# Patient Record
Sex: MALE | Race: WHITE | NOT HISPANIC OR LATINO | URBAN - METROPOLITAN AREA
[De-identification: names, ages, dates, MRNs, and addresses within clinical notes are randomized per-mention and may not be internally consistent; named-entity substitution may affect disease eponyms.]

---

## 2023-07-12 ENCOUNTER — INPATIENT (INPATIENT)
Facility: HOSPITAL | Age: 83
LOS: 27 days | Discharge: HOME HEALTH | DRG: 949 | End: 2023-08-09
Attending: PSYCHIATRY & NEUROLOGY | Admitting: PHYSICAL MEDICINE & REHABILITATION
Payer: MEDICARE

## 2023-07-12 VITALS
DIASTOLIC BLOOD PRESSURE: 74 MMHG | WEIGHT: 154.32 LBS | RESPIRATION RATE: 16 BRPM | HEART RATE: 102 BPM | OXYGEN SATURATION: 94 % | SYSTOLIC BLOOD PRESSURE: 106 MMHG | TEMPERATURE: 99 F | HEIGHT: 70 IN

## 2023-07-12 DIAGNOSIS — S06.5X9A TRAUMATIC SUBDURAL HEMORRHAGE WITH LOSS OF CONSCIOUSNESS OF UNSPECIFIED DURATION, INITIAL ENCOUNTER: ICD-10-CM

## 2023-07-12 DIAGNOSIS — Z96.659 PRESENCE OF UNSPECIFIED ARTIFICIAL KNEE JOINT: Chronic | ICD-10-CM

## 2023-07-12 DIAGNOSIS — Z98.890 OTHER SPECIFIED POSTPROCEDURAL STATES: Chronic | ICD-10-CM

## 2023-07-12 RX ORDER — ACETAMINOPHEN 500 MG
650 TABLET ORAL EVERY 6 HOURS
Refills: 0 | Status: DISCONTINUED | OUTPATIENT
Start: 2023-07-12 | End: 2023-08-09

## 2023-07-12 RX ORDER — TAMSULOSIN HYDROCHLORIDE 0.4 MG/1
0.4 CAPSULE ORAL AT BEDTIME
Refills: 0 | Status: DISCONTINUED | OUTPATIENT
Start: 2023-07-12 | End: 2023-07-20

## 2023-07-12 RX ORDER — DIGOXIN 250 MCG
125 TABLET ORAL DAILY
Refills: 0 | Status: DISCONTINUED | OUTPATIENT
Start: 2023-07-13 | End: 2023-07-25

## 2023-07-12 RX ORDER — AMIODARONE HYDROCHLORIDE 400 MG/1
200 TABLET ORAL DAILY
Refills: 0 | Status: DISCONTINUED | OUTPATIENT
Start: 2023-07-13 | End: 2023-08-09

## 2023-07-12 RX ORDER — POLYETHYLENE GLYCOL 3350 17 G/17G
17 POWDER, FOR SOLUTION ORAL DAILY
Refills: 0 | Status: DISCONTINUED | OUTPATIENT
Start: 2023-07-12 | End: 2023-08-09

## 2023-07-12 RX ORDER — LEVETIRACETAM 250 MG/1
500 TABLET, FILM COATED ORAL
Refills: 0 | Status: COMPLETED | OUTPATIENT
Start: 2023-07-12 | End: 2023-07-23

## 2023-07-12 RX ORDER — ZINC OXIDE 200 MG/G
1 OINTMENT TOPICAL
Refills: 0 | Status: DISCONTINUED | OUTPATIENT
Start: 2023-07-12 | End: 2023-08-09

## 2023-07-12 RX ORDER — LANOLIN ALCOHOL/MO/W.PET/CERES
3 CREAM (GRAM) TOPICAL AT BEDTIME
Refills: 0 | Status: DISCONTINUED | OUTPATIENT
Start: 2023-07-12 | End: 2023-07-14

## 2023-07-12 RX ORDER — SENNA PLUS 8.6 MG/1
2 TABLET ORAL AT BEDTIME
Refills: 0 | Status: DISCONTINUED | OUTPATIENT
Start: 2023-07-12 | End: 2023-08-09

## 2023-07-12 RX ORDER — METOPROLOL TARTRATE 50 MG
50 TABLET ORAL
Refills: 0 | Status: DISCONTINUED | OUTPATIENT
Start: 2023-07-12 | End: 2023-07-14

## 2023-07-12 RX ORDER — DAPAGLIFLOZIN 10 MG/1
10 TABLET, FILM COATED ORAL DAILY
Refills: 0 | Status: DISCONTINUED | OUTPATIENT
Start: 2023-07-12 | End: 2023-07-18

## 2023-07-12 RX ORDER — BACITRACIN ZINC NEOMYCIN SULFATE POLYMYXIN B SULFATE 400; 3.5; 5 [IU]/G; MG/G; [IU]/G
1 OINTMENT TOPICAL
Refills: 0 | Status: DISCONTINUED | OUTPATIENT
Start: 2023-07-12 | End: 2023-08-09

## 2023-07-12 RX ADMIN — TAMSULOSIN HYDROCHLORIDE 0.4 MILLIGRAM(S): 0.4 CAPSULE ORAL at 21:43

## 2023-07-12 RX ADMIN — LEVETIRACETAM 500 MILLIGRAM(S): 250 TABLET, FILM COATED ORAL at 19:52

## 2023-07-12 RX ADMIN — Medication 3 MILLIGRAM(S): at 21:43

## 2023-07-12 RX ADMIN — Medication 50 MILLIGRAM(S): at 19:53

## 2023-07-12 NOTE — PATIENT PROFILE ADULT - FALL HARM RISK - HARM RISK INTERVENTIONS
Assistance with ambulation/Assistance OOB with selected safe patient handling equipment/Communicate Risk of Fall with Harm to all staff/Discuss with provider need for PT consult/Monitor gait and stability/Reinforce activity limits and safety measures with patient and family/Tailored Fall Risk Interventions/Visual Cue: Yellow wristband and red socks/Bed in lowest position, wheels locked, appropriate side rails in place/Call bell, personal items and telephone in reach/Instruct patient to call for assistance before getting out of bed or chair/Non-slip footwear when patient is out of bed/Converse to call system/Physically safe environment - no spills, clutter or unnecessary equipment/Purposeful Proactive Rounding/Room/bathroom lighting operational, light cord in reach

## 2023-07-12 NOTE — H&P ADULT - NSHPLABSRESULTS_GEN_ALL_CORE
Laboratory-Chemistry:    04/10/2023    Glucose: 109    Sodium: 140    Potassium: 4.6    Blood Urea Nitrogen : 39    Creatinine: 1.59   Hematology:    07/10/2023    WBC: 7.08    HgB: 11    Hct: 34.4    Platelets: 158   Cultures:     07/06/2023 COVID-19 PCR;  detected    07/11/2023 COVID-19 PCR:  Not detected   Radiology:     MRI brain: L. Frontal hematoma into subdural space; white matter dx    MRA brain: bilat PCA stenosis    CTH: increase of L SDH -> SAH.    CTH: L frontal subdural hematoma + skull fx. Lacerations to back of head +  right arm.    CTH 07/10: Overall similar examination compared to the CT head of July 7, 2023: Similar volume left convexity and frontal parafalcine subdural hemorrhage. Similar left anterior frontal hemorrhagic contusion with associated subarachnoid hemorrhage. Similar very small volume ventricular hemorrhage. No evidence of interval hemorrhage. No new mass effect, shift or hydrocephalus. Pregnant volume loss. Microvascular ischemic changes. Chronic bilateral cerebellar lacunar infarcts.    CTH/ CT C spine 07/06: 1: Nondisplaced right occipital skull fracture. 2: Contrecoup injury with left anterior falx and left frontal subdural hematoma. 3: Prominent degenerative changes of the cervical spine without evidence of acute traumatic injury.    CXR 07/06: Worsening bilateral multifocal and bibasilar opacities. Additional findings similar to prior study from July 5, 2023.    CXR 07/06: Small calcified density along the inferior glenoid similar in comparison to June 11, 2023. This may be related to chronic labral injury or prior fracture. No additional fracture or malalignment.      TTE  1. Left ventricle: The cavity size is normal. Normal thickness is present. Severe diffuse hypocontractility of the left ventricle is present. The left ventricular ejection fraction is moderately-severely reduced. The LVEF was determined by visual estimate. Left ventricular ejection  fraction is 30-35%. Qualitatively the left ventricular ejection function appears to be moderately to severely reduced. E/e' ratio is suggestive of elevated left atrial pressure. The left ventricular filling pattern is pseudonormal. Diastolic dysfunction is present.  2. Regional wall motion abnormality: Severe hypokinesis of the entire inferior and basal-mid inferolateral myocardium.  3. Right ventricle: The cavity size is mildly dilated. Right ventricular systolic function is mildly reduced. Calculated right ventricular systolic pressure is suggestive of mildly elevated pulmonary pressure.  4. Mitral valve: The annulus is moderately calcified and circumferentially. The leaflets are moderately thickened and severely calcified. The findings are consistent with moderate mitral valve stenosis. There is moderate to severe (3+) mitral valve regurgitation, directed centrally.  Restricted mitral systolic and diastolic leaflet motion, consistent with calcific degeneration.  5. Aortic valve: A bioprosthetic valve is present in the aortic position. There is no aortic stenosis. There is moderate (2+) valvular aortic regurgitation. There is no paravalvular aortic regurgitation. The calculated stroke volume index is reduced.  6. Tricuspid valve: The tricuspid leaflets are mildly thickened. There is severe (4+) tricuspid valve regurgitation.  7. Pericardium, extracardiac: There is no pericardial effusion. Bilateral pleural effusions are present.  Impressions: Compared to the study dated 6/19/2023, there is no significant change

## 2023-07-12 NOTE — H&P ADULT - NSHPREVIEWOFSYSTEMS_GEN_ALL_CORE
REVIEW OF SYSTEMS  Constitutional: No fever, No Chills, + fatigue  HEENT: No eye pain, No visual disturbances, No difficulty hearing  Pulm: No cough,  No shortness of breath  Cardio: No chest pain, No palpitations  GI:  No abdominal pain, No nausea, No vomiting, No diarrhea, No constipation  : No dysuria, No frequency, No hematuria  Neuro: No headaches, No memory loss, + loss of strength, no numbness, No tremors  Skin: No itching, No rashes, No lesions   Endo: No temperature intolerance  MSK: No joint pain, No joint swelling, No muscle pain, No Neck pain,  No back pain  Psych:  No depression, No anxiety REVIEW OF SYSTEMS  Constitutional: No fever, No Chills, + fatigue  HEENT: No eye pain, No visual disturbances, No difficulty hearing  Pulm: No cough,  No shortness of breath  Cardio: No chest pain, No palpitations  GI:  No abdominal pain, No nausea, No vomiting, No diarrhea, No constipation  : No dysuria, No frequency, No hematuria  Neuro: No headaches, No memory loss, + loss of strength, no numbness, No tremors  Skin: No itching, No rashes, No lesions   Endo: No temperature intolerance  MSK: No joint pain, No joint swelling, No muscle pain, +Neck pain,  +back pain  Psych:  No depression, No anxiety

## 2023-07-12 NOTE — H&P ADULT - ATTENDING COMMENTS
Rehab Attending- Patient seen and examined by me on 7/13- Case discussed, above note reviewed by me with modifications made    patient seen and evaluated bedside  reports new onset dizziness while ambulating today- resolves by sitting  Syst BPs 100s this AM,   Moved Bowels 7/11, voiding  no headaches, no chest pain , no SOB, no N/V + Neck/ back pain    MEDICATIONS  (STANDING):  aMIOdarone    Tablet 200 milliGRAM(s) Oral daily  dapagliflozin 10 milliGRAM(s) Oral daily  digoxin     Tablet 125 MICROGram(s) Oral daily  levETIRAcetam 500 milliGRAM(s) Oral two times a day  metoprolol tartrate 50 milliGRAM(s) Oral two times a day  neomycin/bacitracin/polymyxin Topical Ointment 1 Application(s) Topical two times a day  senna 2 Tablet(s) Oral at bedtime  tamsulosin 0.4 milliGRAM(s) Oral at bedtime  zinc oxide 40% Paste 1 Application(s) Topical two times a day    MEDICATIONS  (PRN):  acetaminophen     Tablet .. 650 milliGRAM(s) Oral every 6 hours PRN Mild Pain (1 - 3)  melatonin 3 milliGRAM(s) Oral at bedtime PRN Insomnia  polyethylene glycol 3350 17 Gram(s) Oral daily PRN Constipation                            11.5   6.52  )-----------( 157      ( 13 Jul 2023 06:00 )             35.7     07-13    136  |  101  |  35<H>  ----------------------------<  102<H>  4.6   |  28  |  1.38<H>    Ca    8.5      13 Jul 2023 06:00  Mg     2.1     07-13    TPro  6.2  /  Alb  2.6<L>  /  TBili  0.5  /  DBili  x   /  AST  20  /  ALT  21  /  AlkPhos  92  07-13    Vital Signs Last 24 Hrs  T(C): 36.6 (13 Jul 2023 07:39), Max: 37 (12 Jul 2023 17:43)  T(F): 97.8 (13 Jul 2023 07:39), Max: 98.6 (12 Jul 2023 17:43)  HR: 105 (13 Jul 2023 07:39) (101 - 109)  BP: 106/67 (13 Jul 2023 07:39) (106/67 - 138/72)  BP(mean): --  RR: 16 (13 Jul 2023 07:39) (16 - 16)  SpO2: 98% (13 Jul 2023 07:39) (94% - 98%)    Gen - NAD, Comfortable  HEENT - NCAT, EOMI, MMM  Neck - Supple, No limited ROM  Pulm - CTAB, No wheeze, No rhonchi, No crackles  Cardiovascular - IRREGULAR , S1S2  Chest - good chest expansion, good respiratory effort  Abdomen - Soft, NT/ND, +BS  Extremities - No Cyanosis, no clubbing, no edema, no calf tenderness  Neuro-     Cognitive - awake, alert, oriented to person, place, date, year, and situation.  Able to follow command     Communication - Fluent, Comprehensible     Attention: Intact     Memory: Recall 3 objects immediate and 3 min later     Cranial Nerves - CN 2-12 intact no nystagmus     Motor - No focal deficits.                     LEFT    UE - 4+/5                    RIGHT UE - 4+/5                    LEFT    LE - 4+/5                    RIGHT LE - 4+/5        Sensory - Intact  to LT      Reflexes - DTR Intact, No primitive reflexive     Coordination -  intact      Tone - normal  MSK: Kyphosis  Psychiatric - Mood stable, Affect WNL  Skin:  skin tear  x 2 to right forearm, blanchable redness to thoracic spine, moisture associated skin dermatitis to buttock, posterior scalp scab    IMP Rehab gait ab SP fall, SDH/SAH- good candidate for intensive rehab- will tolerate three hours rehab daily  Afib- Continue Metoprolol, amidarone, digoxin- hold AC- Watchman procedure as OPD  DVT Proph- TEDS OOB  Dizziness- RO Orthostasis- Encourage Gentle PO- Check orthostatics in AM- TEDS/ ABD Binder- may need to decrease dose of Metoprolol  B/B program- Flomax HS, senna, miralax

## 2023-07-12 NOTE — H&P ADULT - ASSESSMENT
Assessment/Plan:  CHADD VALDIVIA is a 83y with ****.  Hospital Course complicated by ***. Patient now admitted for a multidisciplinary rehab program. 07-12-23 @ 15:20        Comprehensive Multidisciplinary Rehab Program:  - Start comprehensive rehab program of PT/OT/SLP - 3 hours a day, 5 days a week. P&O as needed       HTN  -   - Monitor BP    HLD  - cont statin    T2DM  - Lantus  - Premeal  - SSI  - Monitor fingersticks    Pain  - Tylenol PRN  - Avoid sedating medications that may interfere with cognitive recovery    Mood / Cognition  - Neuropsychology consult  - [ ] Enhanced Supervision  [ ] Constant Observation    Sleep  - Maintain quiet hours and a low stim environment.   - Monitor sleep logs (for BIU)  - Melatonin PRN     GI / Bowel  - Senna qHS  - Miralax PRN Daily  - GI ppx: ***     / Bladder  - Currently patient voids:      [ ] independent      [ ] external collection device (condom cath)      [ ] Indwelling mcclain catheter      [ ] Intermittent catheterization  - Continue bladder scans Q8 hours with straight cath for >400cc.  - Toileting schedule every 4 hours    Skin / Pressure injury  - Skin assessment on admission performed [  ] :   - Monitor Incisions:    - Turn q2 hours in bed while awake, air mattress  - nursing to monitor skin qShift  - soft heel protectors  - skin barrier cream PRN    Diet/Dysphagia:  - Diet Consistency: ***  - Supplements: ***  - Aspiration Precautions  - SLP consult for swallow function evaluation and treatment  - Nutrition consult    DVT prophylaxis:   - *****  - SCDs  - Last Doppler on ***       Outpatient Follow-up:  ** Specialty and Name of physician      Code Status/Emergency Contact:      ---------------    Goals: Safe discharge to home  Estimated Length of Stay: 10-14 days  Rehab Potential: Good  Medical Prognosis: Good  Estimated Disposition: Home with home care      PRESCREEN COMPARISON:  I have reviewed the prescreen information and I have found no relevant changes between the preadmission screening and my post admission evaluation.    RATIONALE FOR INPATIENT ADMISSION: Patient demonstrates the following:  [X] Medically appropriate for rehabilitation admission  [X] Has attainable rehab goals with an appropriate initial discharge plan  [X]Has rehabilitation potential (expected to make a significant improvement within a reasonable period of time)  [X] Requires close medical management by a rehab physician, rehab nursing care, Hospitalist and comprehensive interdisciplinary team (including PT, OT and/or SLP, Prosthetics and Orthotics)   Assessment/Plan:  CHADD VALDIVIA is a 83y with PMH of A fib, HFrEF, HTN, and hx of AVR who presented to the [facility] on 7/6 after fall, found to have SDH/SAH.  Hospital Course complicated by A fib RVR and COVID (+). Patient now admitted for a multidisciplinary rehab program. 07-12-23 @ 15:20      SAH/SDH  - Continue Keppra 500mg BID for seizure ppx  - Aspiration and fall precautions  - Start comprehensive rehab program of PT/OT/SLP - 3 hours a day, 5 days a week. P&O as needed   - Patient out of system, admitting team to verify medications, hospital course, and follow up providers    A fib  - Course c/b A fib with RVR  - Will require outpatient cardio f/u, patient good candidate for Watchman implant in future  - Continue amiodarone PO 200mg qd, Lopressor 50mg BID, Farxiga 10mg qd     HFrEF  - EF 30%  - Continue ___  - Caution with excessive fluids    COVID (+)  - PCR positive 7/7, repeat on 7/11 negative  - Monitor    HTN  - Continue lopressor 50mg BID  - Monitor BP    HLD  - cont statin    BPH  - Continue flomax    Pain  - Tylenol PRN    Sleep  - Melatonin PRN     GI / Bowel  - Senna qHS  - Miralax PRN Daily  - GI ppx: Protonix     / Bladder  - Currently patient voids independently. Check PVRs on admission. Straight cath for > 400ccs    Skin / Pressure injury  - Skin assessment on admission performed [  ] :   - Monitor Incisions:    - Turn q2 hours in bed while awake, air mattress  - nursing to monitor skin qShift  - soft heel protectors  - skin barrier cream PRN    Diet/Dysphagia:  - Diet Consistency: ***  - Supplements: ***  - Aspiration Precautions  - SLP consult for swallow function evaluation and treatment  - Nutrition consult    DVT prophylaxis:   - *****  - Last Doppler on ***       Outpatient Follow-up:  ** Specialty and Name of physician      Code Status/Emergency Contact:      ---------------    Goals: Safe discharge to home  Estimated Length of Stay: 10-14 days  Rehab Potential: Good  Medical Prognosis: Good  Estimated Disposition: Home with home care      PRESCREEN COMPARISON:  I have reviewed the prescreen information and I have found no relevant changes between the preadmission screening and my post admission evaluation.    RATIONALE FOR INPATIENT ADMISSION: Patient demonstrates the following:  [X] Medically appropriate for rehabilitation admission  [X] Has attainable rehab goals with an appropriate initial discharge plan  [X]Has rehabilitation potential (expected to make a significant improvement within a reasonable period of time)  [X] Requires close medical management by a rehab physician, rehab nursing care, Hospitalist and comprehensive interdisciplinary team (including PT, OT and/or SLP, Prosthetics and Orthotics)   Assessment/Plan:  CHADD VALDIVIA is a 83y with PMH of A fib, HFrEF, HTN, and hx of AVR who presented to the [facility] on 7/6 after fall, found to have SDH/SAH.  Hospital Course complicated by A fib RVR and COVID (+). Patient now admitted for a multidisciplinary rehab program. 07-12-23 @ 15:20    SAH/SDH  - Continue Keppra 500mg BID for seizure ppx  - Aspiration and fall precautions  - Start comprehensive rehab program of PT/OT/SLP - 3 hours a day, 5 days a week. P&O as needed     A fib  - Course c/b A fib with RVR  - Will require outpatient cardio f/u, patient good candidate for Watchman implant in future  - Continue amiodarone PO 200mg qd, Lopressor 50mg BID, Farxiga 10mg qd     HFrEF  - EF 30%  - Continue amiodarone 200mg daily  - Caution with excessive fluids    COVID (+)  - PCR positive 7/7, repeat on 7/11 negative  - Monitor    HTN  - Continue lopressor 50mg BID  - Monitor BP    BPH  - Continue Flomax 0.4mg nightly    Pain  - Tylenol PRN    Sleep  - Melatonin PRN     GI / Bowel  - Senna qHS  - Miralax PRN Daily  - GI ppx: Protonix     / Bladder  - Currently patient voids independently. Check PVRs on admission. Straight cath for > 400ccs    Skin / Pressure injury  - Skin assessment on admission performed: skin tear  x 2 to right forearm, blanchable redness to thoracic spine, moisture associated skin dermatitis to buttock  - Turn q2 hours in bed while awake, air mattress  - nursing to monitor skin q Shift  - soft heel protectors  - skin barrier cream PRN    Diet/Dysphagia:  - Diet Consistency:Regular  - Aspiration Precautions  - SLP consult for swallow function evaluation and treatment  - Nutrition consult    DVT prophylaxis:   - SCD  - f/u Doppler on admission    Outpatient Follow-up:  Specialty and Name of physician  Nephrology  Anshu Martino MD  [address] [place] [address], [address]  [facility] [address] [phone]    [facility]  [address], [address]  [place] [address] [phone]    Electrophysiology  Jason Ortega MD  [address] [place] [address] [phone]    Eliseo Braxton MD  [address] [place] [address]  [place] [address]  [phone]    Code Status/Emergency Contact:    ---------------  Goals: Safe discharge to home  Estimated Length of Stay: 10-14 days  Rehab Potential: Good  Medical Prognosis: Good  Estimated Disposition: Home with home care      PRESCREEN COMPARISON:  I have reviewed the prescreen information and I have found no relevant changes between the preadmission screening and my post admission evaluation.    RATIONALE FOR INPATIENT ADMISSION: Patient demonstrates the following:  [X] Medically appropriate for rehabilitation admission  [X] Has attainable rehab goals with an appropriate initial discharge plan  [X]Has rehabilitation potential (expected to make a significant improvement within a reasonable period of time)  [X] Requires close medical management by a rehab physician, rehab nursing care, Hospitalist and comprehensive interdisciplinary team (including PT, OT and/or SLP, Prosthetics and Orthotics)   Assessment/Plan:  CHADD VALDIVIA is a 83y with PMH of A fib, HFrEF, HTN, and hx of AVR who presented to the [facility] on 7/6 after fall, found to have SDH/SAH.  Hospital Course complicated by A fib RVR and COVID (+). Patient now admitted for a multidisciplinary rehab program. 07-12-23 @ 15:20    SAH/SDH  - Continue Keppra 500mg BID for seizure ppx  - Aspiration and fall precautions  - Start comprehensive rehab program of PT/OT/SLP - 3 hours a day, 5 days a week. P&O as needed     A fib  - Course c/b A fib with RVR  - Will require outpatient cardio f/u, patient good candidate for Watchman implant in future  - Continue amiodarone PO 200mg qd, Lopressor 50mg BID, Farxiga 10mg qd     HFrEF  - EF 30%  - Continue amiodarone 200mg daily  - Caution with excessive fluids    COVID (+)  - PCR positive 7/7, repeat on 7/11 negative  - Monitor    HTN  - Continue lopressor 50mg BID  - Monitor BP    BPH  - Continue Flomax 0.4mg nightly    Pain  - Tylenol PRN    Sleep  - Melatonin PRN     GI / Bowel  - Senna qHS  - Miralax PRN Daily  - GI ppx: Protonix     / Bladder  - Currently patient voids independently. Check PVRs on admission. Straight cath for > 400ccs    Skin / Pressure injury  - Skin assessment on admission performed: skin tear  x 2 to right forearm, blanchable redness to thoracic spine, moisture associated skin dermatitis to buttock, posterior scalp scab  - Turn q2 hours in bed while awake, air mattress  - nursing to monitor skin q Shift  - soft heel protectors  - skin barrier cream PRN    Diet/Dysphagia:  - Diet Consistency: Regular  - Aspiration Precautions  - SLP consult for swallow function evaluation and treatment  - Nutrition consult    DVT prophylaxis:   - SCD  - f/u Doppler on admission    Outpatient Follow-up:  Specialty and Name of physician  Nephrology  Anshu Martino MD  [facility] , [address]  [facility] [address] [phone]    [facility]  [address], [address]  [place] [address] [phone]    Electrophysiology  Jason Ortega MD  [address] [place] [address] [phone]    Eliseo Braxton MD  [address] [place] [address]  [place] [address]  [phone]    Code Status/Emergency Contact:    ---------------  Goals: Safe discharge to home  Estimated Length of Stay: 10-14 days  Rehab Potential: Good  Medical Prognosis: Good  Estimated Disposition: Home with home care      PRESCREEN COMPARISON:  I have reviewed the prescreen information and I have found no relevant changes between the preadmission screening and my post admission evaluation.    RATIONALE FOR INPATIENT ADMISSION: Patient demonstrates the following:  [X] Medically appropriate for rehabilitation admission  [X] Has attainable rehab goals with an appropriate initial discharge plan  [X]Has rehabilitation potential (expected to make a significant improvement within a reasonable period of time)  [X] Requires close medical management by a rehab physician, rehab nursing care, Hospitalist and comprehensive interdisciplinary team (including PT, OT and/or SLP, Prosthetics and Orthotics)

## 2023-07-12 NOTE — H&P ADULT - NSICDXPASTSURGICALHX_GEN_ALL_CORE_FT
PAST SURGICAL HISTORY:  Aortic valve replaced     S/P hernia repair     S/P total knee arthroplasty

## 2023-07-12 NOTE — H&P ADULT - NSHPPHYSICALEXAM_GEN_ALL_CORE
PHYSICAL EXAM  VITALS  T(C): 37 (07-12-23 @ 17:43), Max: 37 (07-12-23 @ 17:43)  HR: 102 (07-12-23 @ 17:43) (102 - 102)  BP: 106/74 (07-12-23 @ 17:43) (106/74 - 106/74)  RR: 16 (07-12-23 @ 17:43) (16 - 16)  SpO2: 94% (07-12-23 @ 17:43) (94% - 94%)    Gen - NAD, Comfortable  HEENT - NCAT, EOMI, MMM  Neck - Supple, No limited ROM  Pulm - CTAB, No wheeze, No rhonchi, No crackles  Cardiovascular - IRREGULAR , S1S2  Chest - good chest expansion, good respiratory effort  Abdomen - Soft, NT/ND, +BS  Extremities - No Cyanosis, no clubbing, no edema, no calf tenderness  Neuro-     Cognitive - awake, alert, oriented to person, place, date, year, and situation.  Able to follow command     Communication - Fluent, Comprehensible     Attention: Intact     Memory: Recall 3 objects immediate and 3 min later     Cranial Nerves - CN 2-12 intact     Motor - No focal deficits.                     LEFT    UE - 4/5                    RIGHT UE - 4/5                    LEFT    LE - 4/5                    RIGHT LE - 4/5        Sensory - Intact  to LT      Reflexes - DTR Intact, No primitive reflexive     Coordination -  intact o     Tone - normal  MSK: Kyphosis  Psychiatric - Mood stable, Affect WNL  Skin:  skin tear  x 2 to right forearm, blanchable redness to thoracic spine, moisture associated skin dermatitis to buttock PHYSICAL EXAM  VITALS  T(C): 37 (07-12-23 @ 17:43), Max: 37 (07-12-23 @ 17:43)  HR: 102 (07-12-23 @ 17:43) (102 - 102)  BP: 106/74 (07-12-23 @ 17:43) (106/74 - 106/74)  RR: 16 (07-12-23 @ 17:43) (16 - 16)  SpO2: 94% (07-12-23 @ 17:43) (94% - 94%)    Gen - NAD, Comfortable  HEENT - NCAT, EOMI, MMM  Neck - Supple, No limited ROM  Pulm - CTAB, No wheeze, No rhonchi, No crackles  Cardiovascular - IRREGULAR , S1S2  Chest - good chest expansion, good respiratory effort  Abdomen - Soft, NT/ND, +BS  Extremities - No Cyanosis, no clubbing, no edema, no calf tenderness  Neuro-     Cognitive - awake, alert, oriented to person, place, date, year, and situation.  Able to follow command     Communication - Fluent, Comprehensible     Attention: Intact     Memory: Recall 3 objects immediate and 3 min later     Cranial Nerves - CN 2-12 intact     Motor - No focal deficits.                     LEFT    UE - 4/5                    RIGHT UE - 4/5                    LEFT    LE - 4/5                    RIGHT LE - 4/5        Sensory - Intact  to LT      Reflexes - DTR Intact, No primitive reflexive     Coordination -  intact o     Tone - normal  MSK: Kyphosis  Psychiatric - Mood stable, Affect WNL  Skin:  skin tear  x 2 to right forearm, blanchable redness to thoracic spine, moisture associated skin dermatitis to buttock, posterior scalp scab PHYSICAL EXAM  VITALS  T(C): 37 (07-12-23 @ 17:43), Max: 37 (07-12-23 @ 17:43)  HR: 102 (07-12-23 @ 17:43) (102 - 102)  BP: 106/74 (07-12-23 @ 17:43) (106/74 - 106/74)  RR: 16 (07-12-23 @ 17:43) (16 - 16)  SpO2: 94% (07-12-23 @ 17:43) (94% - 94%)    Gen - NAD, Comfortable  HEENT - NCAT, EOMI, MMM  Neck - Supple, No limited ROM  Pulm - CTAB, No wheeze, No rhonchi, No crackles  Cardiovascular - IRREGULAR , S1S2  Chest - good chest expansion, good respiratory effort  Abdomen - Soft, NT/ND, +BS  Extremities - No Cyanosis, no clubbing, no edema, no calf tenderness  Neuro-     Cognitive - awake, alert, oriented to person, place, date, year, and situation.  Able to follow command     Communication - Fluent, Comprehensible     Attention: Intact     Memory: Recall 3 objects immediate and 3 min later     Cranial Nerves - CN 2-12 intact     Motor - No focal deficits.                     LEFT    UE - 4+/5                    RIGHT UE - 4+/5                    LEFT    LE - 4+/5                    RIGHT LE - 4+/5        Sensory - Intact  to LT      Reflexes - DTR Intact, No primitive reflexive     Coordination -  intact o     Tone - normal  MSK: Kyphosis  Psychiatric - Mood stable, Affect WNL  Skin:  skin tear  x 2 to right forearm, blanchable redness to thoracic spine, moisture associated skin dermatitis to buttock, posterior scalp scab

## 2023-07-12 NOTE — H&P ADULT - NSHPSOCIALHISTORY_GEN_ALL_CORE
Smoking - Denied  EtOH - Denied   Drugs - Denied     Patient lives alone in a 2 level house chair lift to second floor  Independent with ADLs and functional transfers, No AD s/p fall. has cane and RW as needed Son and daughter help with grocery shopping and cooking.     CURRENT FUNCTIONAL STATUS  Bed Mobility:   Transfers:   Gait: Smoking -   EtOH -   Drugs -     Patient lives alone in a 2 level house chair lift to second floor  Independent with ADLs and functional transfers, No AD s/p fall. has cane and RW as needed Son and daughter help with grocery shopping and cooking.     CURRENT FUNCTIONAL STATUS  07/11/2023 PT note    Supine to sit:  CGA    Sit to stand: CGA HOB elevated    stand to sit:  CGA    Posture:  forward lean, cues to improve    Ambulation:  80 Feet RW, Min A; CGA. Antalgic;Leaning;Decrease d foot clearance  decreased step length and balance, needs verbal cues for coordination  Self Care:  07/11/2023 OT note    Rolling, supine to sit:  CGA    Bed to chair:  CGA, Min A    Turn;Dangle;Stand at bedside;Functional mobility;Ambulate in room:  Min A 20 feet x2 bed to bathroom door to chair no AD    Sit to stand:  Additional time;Adaptiv e equipment;Contact guard;Min assist    Arm chair transfer: Additional time;Adaptiv e equipment;Contact guard (verbal cues for safety.)    ADLS: Supine, bed;Edge of bed;Chair  Upper body dressing: Min assist;Mod assist Setup;Steadying;Verbal cueing;Pull around back;Pull down in back;Fasteners;Increased time to complete    Lower body dressing: Contact guard;Min assist  Setup;Steadying;Verbal cueing;Increased time to complete;Don/doff R sock;Don/doff L sock Smoking - former  EtOH - former  Drugs - denies    Patient lives alone in a 2 level house, ~ 7 [address],  chair lift to second floor  Independent with ADLs and functional transfers, No AD s/p fall. has cane and RW as needed Son and daughter help with grocery shopping and cooking.     CURRENT FUNCTIONAL STATUS  07/11/2023 PT note    Supine to sit:  CGA    Sit to stand: CGA HOB elevated    stand to sit:  CGA    Posture:  forward lean, cues to improve    Ambulation:  80 Feet RW, Min A; CGA. Antalgic;Leaning;Decrease d foot clearance  decreased step length and balance, needs verbal cues for coordination  Self Care:  07/11/2023 OT note    Rolling, supine to sit:  CGA    Bed to chair:  CGA, Min A    Turn;Dangle;Stand at bedside;Functional mobility;Ambulate in room:  Min A 20 feet x2 bed to bathroom door to chair no AD    Sit to stand:  Additional time;Adaptiv e equipment;Contact guard;Min assist    Arm chair transfer: Additional time;Adaptiv e equipment;Contact guard (verbal cues for safety.)    ADLS: Supine, bed;Edge of bed;Chair  Upper body dressing: Min assist;Mod assist Setup;Steadying;Verbal cueing;Pull around back;Pull down in back;Fasteners;Increased time to complete    Lower body dressing: Contact guard;Min assist  Setup;Steadying;Verbal cueing;Increased time to complete;Don/doff R sock;Don/doff L sock

## 2023-07-12 NOTE — H&P ADULT - HISTORY OF PRESENT ILLNESS
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 He was found to have a subdural/subarachnoid hemorrhage. Patient with A fib with a moderately elevated ventricular response on amiodarone and metoprolol A rate control strategy is recommended by EPS and as per MD note patient is a good candidate for Watchman implant at some point and will need out pt f/u    07/06/2023 patient found +COVID-19 PCR    Patient with tachycardia as PER MD, pt with A fib and with pt's condition tachycardia will not resolve    This is an 83 year old male with PMH of A fib, HFrEF, HTN, and hx of AVR who presented to the [facility] on 7/6 after fall. He was found to have a subdural/subarachnoid hemorrhage. Hospital course complicated by A fib with RVR s/p amiodarone and metoprolol. Per cardio recs, recommended by EPS and pt a good candidate for Watchman implant in future and will require outpatient f/u. Course further complicated by COVID (+) on 7/6. Repeat PCR on 7/11 negative. Patient evaluated by PT/OT and recommended for acute inpatient rehab. Patient is medically stable for DC to [facility] on 7/12.

## 2023-07-12 NOTE — H&P ADULT - NSICDXPASTMEDICALHX_GEN_ALL_CORE_FT
PAST MEDICAL HISTORY:  Aortic valve replaced     Atrial fibrillation     Congestive heart failure (CHF)     Hypertension

## 2023-07-13 LAB
ALBUMIN SERPL ELPH-MCNC: 2.6 G/DL — LOW (ref 3.3–5)
ALP SERPL-CCNC: 92 U/L — SIGNIFICANT CHANGE UP (ref 40–120)
ALT FLD-CCNC: 21 U/L — SIGNIFICANT CHANGE UP (ref 10–45)
ANION GAP SERPL CALC-SCNC: 7 MMOL/L — SIGNIFICANT CHANGE UP (ref 5–17)
AST SERPL-CCNC: 20 U/L — SIGNIFICANT CHANGE UP (ref 10–40)
BASOPHILS # BLD AUTO: 0.03 K/UL — SIGNIFICANT CHANGE UP (ref 0–0.2)
BASOPHILS NFR BLD AUTO: 0.5 % — SIGNIFICANT CHANGE UP (ref 0–2)
BILIRUB SERPL-MCNC: 0.5 MG/DL — SIGNIFICANT CHANGE UP (ref 0.2–1.2)
BUN SERPL-MCNC: 35 MG/DL — HIGH (ref 7–23)
CALCIUM SERPL-MCNC: 8.5 MG/DL — SIGNIFICANT CHANGE UP (ref 8.4–10.5)
CHLORIDE SERPL-SCNC: 101 MMOL/L — SIGNIFICANT CHANGE UP (ref 96–108)
CO2 SERPL-SCNC: 28 MMOL/L — SIGNIFICANT CHANGE UP (ref 22–31)
CREAT SERPL-MCNC: 1.38 MG/DL — HIGH (ref 0.5–1.3)
EGFR: 51 ML/MIN/1.73M2 — LOW
EOSINOPHIL # BLD AUTO: 0 K/UL — SIGNIFICANT CHANGE UP (ref 0–0.5)
EOSINOPHIL NFR BLD AUTO: 0 % — SIGNIFICANT CHANGE UP (ref 0–6)
GLUCOSE SERPL-MCNC: 102 MG/DL — HIGH (ref 70–99)
HCT VFR BLD CALC: 35.7 % — LOW (ref 39–50)
HGB BLD-MCNC: 11.5 G/DL — LOW (ref 13–17)
IMM GRANULOCYTES NFR BLD AUTO: 0.6 % — SIGNIFICANT CHANGE UP (ref 0–0.9)
LYMPHOCYTES # BLD AUTO: 1.08 K/UL — SIGNIFICANT CHANGE UP (ref 1–3.3)
LYMPHOCYTES # BLD AUTO: 16.6 % — SIGNIFICANT CHANGE UP (ref 13–44)
MAGNESIUM SERPL-MCNC: 2.1 MG/DL — SIGNIFICANT CHANGE UP (ref 1.6–2.6)
MCHC RBC-ENTMCNC: 32.2 GM/DL — SIGNIFICANT CHANGE UP (ref 32–36)
MCHC RBC-ENTMCNC: 33.5 PG — SIGNIFICANT CHANGE UP (ref 27–34)
MCV RBC AUTO: 104.1 FL — HIGH (ref 80–100)
MONOCYTES # BLD AUTO: 0.95 K/UL — HIGH (ref 0–0.9)
MONOCYTES NFR BLD AUTO: 14.6 % — HIGH (ref 2–14)
NEUTROPHILS # BLD AUTO: 4.42 K/UL — SIGNIFICANT CHANGE UP (ref 1.8–7.4)
NEUTROPHILS NFR BLD AUTO: 67.7 % — SIGNIFICANT CHANGE UP (ref 43–77)
NRBC # BLD: 0 /100 WBCS — SIGNIFICANT CHANGE UP (ref 0–0)
PLATELET # BLD AUTO: 157 K/UL — SIGNIFICANT CHANGE UP (ref 150–400)
POTASSIUM SERPL-MCNC: 4.6 MMOL/L — SIGNIFICANT CHANGE UP (ref 3.5–5.3)
POTASSIUM SERPL-SCNC: 4.6 MMOL/L — SIGNIFICANT CHANGE UP (ref 3.5–5.3)
PROT SERPL-MCNC: 6.2 G/DL — SIGNIFICANT CHANGE UP (ref 6–8.3)
RBC # BLD: 3.43 M/UL — LOW (ref 4.2–5.8)
RBC # FLD: 15.2 % — HIGH (ref 10.3–14.5)
SODIUM SERPL-SCNC: 136 MMOL/L — SIGNIFICANT CHANGE UP (ref 135–145)
WBC # BLD: 6.52 K/UL — SIGNIFICANT CHANGE UP (ref 3.8–10.5)
WBC # FLD AUTO: 6.52 K/UL — SIGNIFICANT CHANGE UP (ref 3.8–10.5)

## 2023-07-13 PROCEDURE — 99222 1ST HOSP IP/OBS MODERATE 55: CPT | Mod: GC

## 2023-07-13 PROCEDURE — 99223 1ST HOSP IP/OBS HIGH 75: CPT

## 2023-07-13 PROCEDURE — 93970 EXTREMITY STUDY: CPT | Mod: 26

## 2023-07-13 PROCEDURE — 93010 ELECTROCARDIOGRAM REPORT: CPT

## 2023-07-13 PROCEDURE — 70450 CT HEAD/BRAIN W/O DYE: CPT | Mod: 26

## 2023-07-13 RX ADMIN — DAPAGLIFLOZIN 10 MILLIGRAM(S): 10 TABLET, FILM COATED ORAL at 11:48

## 2023-07-13 RX ADMIN — AMIODARONE HYDROCHLORIDE 200 MILLIGRAM(S): 400 TABLET ORAL at 05:46

## 2023-07-13 RX ADMIN — LEVETIRACETAM 500 MILLIGRAM(S): 250 TABLET, FILM COATED ORAL at 17:38

## 2023-07-13 RX ADMIN — TAMSULOSIN HYDROCHLORIDE 0.4 MILLIGRAM(S): 0.4 CAPSULE ORAL at 21:30

## 2023-07-13 RX ADMIN — Medication 125 MICROGRAM(S): at 05:46

## 2023-07-13 RX ADMIN — Medication 50 MILLIGRAM(S): at 05:46

## 2023-07-13 RX ADMIN — BACITRACIN ZINC NEOMYCIN SULFATE POLYMYXIN B SULFATE 1 APPLICATION(S): 400; 3.5; 5 OINTMENT TOPICAL at 19:56

## 2023-07-13 RX ADMIN — ZINC OXIDE 1 APPLICATION(S): 200 OINTMENT TOPICAL at 17:38

## 2023-07-13 RX ADMIN — Medication 50 MILLIGRAM(S): at 17:38

## 2023-07-13 RX ADMIN — Medication 3 MILLIGRAM(S): at 21:31

## 2023-07-13 RX ADMIN — BACITRACIN ZINC NEOMYCIN SULFATE POLYMYXIN B SULFATE 1 APPLICATION(S): 400; 3.5; 5 OINTMENT TOPICAL at 05:54

## 2023-07-13 RX ADMIN — LEVETIRACETAM 500 MILLIGRAM(S): 250 TABLET, FILM COATED ORAL at 05:46

## 2023-07-13 RX ADMIN — ZINC OXIDE 1 APPLICATION(S): 200 OINTMENT TOPICAL at 05:47

## 2023-07-13 RX ADMIN — SENNA PLUS 2 TABLET(S): 8.6 TABLET ORAL at 21:31

## 2023-07-13 NOTE — DIETITIAN INITIAL EVALUATION ADULT - ADD RECOMMEND
1. Continue DASH/TLC diet.   2. Recommend Ensure Plus High Protein Daily 8 oz (Provides 350 kcal, 20 grams of protein) TID to assist pt in meeting estimated protein energy needs.  3. Monitor PO intake, GI tolerance, skin integrity, labs, weight, and bowel movement regularity.   4. Honor food preferences as feasible. Assist with meals PRN and encourage PO intake.  5. Provide ongoing diet education as needed  6. RD remains available upon request and will follow-up per protocol

## 2023-07-13 NOTE — DIETITIAN INITIAL EVALUATION ADULT - SIGNS/SYMPTOMS
as evidenced by PO intake <75% EER>7day, 10lb(6%) wt loss x3m subcutaneous fat loss/muscle depletion

## 2023-07-13 NOTE — DIETITIAN INITIAL EVALUATION ADULT - NUTRITION DIAGNOSIS
February 9, 2023      [place] [address] [address] [place] [address]  Phone: [phone]  Fax: [phone]       Patient: Júnior Torres   YOB: 1948  Date of Visit: 02/09/2023    To Whom It May Concern:    Bhanu Torres was at [facility] on 02/09/2023. Please excuse him from 2/5/-2/10/23. He may return to work/school on 02/11/2023 with no restrictions. If you have any questions or concerns, or if I can be of further assistance, please do not hesitate to contact me.    Sincerely,    Rom Muñoz MA        yes...

## 2023-07-13 NOTE — CONSULT NOTE ADULT - SUBJECTIVE AND OBJECTIVE BOX
Dr. Seth Hospitalist Progress Note  [place] [id number]    Patient is a 83y old  Male who presents with a chief complaint of s/p subdural/subarachnoid hemorrhage (12 Jul 2023 15:07)    HPI:  This is an 83 year old male with PMH of A fib, HFrEF, HTN, and hx of AVR who presented to the [facility] on 7/6 after fall. He was found to have a subdural/subarachnoid hemorrhage. Hospital course complicated by A fib with RVR s/p amiodarone and metoprolol. Per cardio recs, recommended by EPS and pt a good candidate for Watchman implant in future and will require outpatient f/u. Course further complicated by COVID (+) on 7/6. Repeat PCR on 7/11 negative. Patient evaluated by PT/OT and recommended for acute inpatient rehab. Patient is medically stable for DC to [facility] on 7/12. (12 Jul 2023 15:07)    interval:  seen and examined  Chart reviewed  No overnight events  BM+  No pain  No complaints      ROS:  denied fever/chills/CP/SOB/cough/palpitation/dizziness/abdominal pian/nausea/vomiting/diarrhoea/constipation/dysuria/leg or calf pain/headaches.all other ROS neg    PAST MEDICAL HISTORY:  Aortic valve replaced     Atrial fibrillation     Congestive heart failure (CHF)     Hypertension.     PAST SURGICAL HISTORY:  Aortic valve replaced     S/P hernia repair     S/P total knee arthroplasty.    Social:  Smoking - former  EtOH - former  Drugs - denies    Allergy: NKDA    MEDICATIONS  (STANDING):  aMIOdarone    Tablet 200 milliGRAM(s) Oral daily  dapagliflozin 10 milliGRAM(s) Oral daily  digoxin     Tablet 125 MICROGram(s) Oral daily  levETIRAcetam 500 milliGRAM(s) Oral two times a day  metoprolol tartrate 50 milliGRAM(s) Oral two times a day  neomycin/bacitracin/polymyxin Topical Ointment 1 Application(s) Topical two times a day  senna 2 Tablet(s) Oral at bedtime  tamsulosin 0.4 milliGRAM(s) Oral at bedtime  zinc oxide 40% Paste 1 Application(s) Topical two times a day    MEDICATIONS  (PRN):  acetaminophen     Tablet .. 650 milliGRAM(s) Oral every 6 hours PRN Mild Pain (1 - 3)  melatonin 3 milliGRAM(s) Oral at bedtime PRN Insomnia  polyethylene glycol 3350 17 Gram(s) Oral daily PRN Constipation      T(C): 36.6 (07-13-23 @ 07:39), Max: 37 (07-12-23 @ 17:43)  HR: 105 (07-13-23 @ 07:39) (101 - 109)  BP: 106/67 (07-13-23 @ 07:39) (106/67 - 138/72)  RR: 16 (07-13-23 @ 07:39) (16 - 16)  SpO2: 98% (07-13-23 @ 07:39) (94% - 98%)    07-12-23 @ 07:01  -  07-13-23 @ 07:00  --------------------------------------------------------  IN: 0 mL / OUT: 500 mL / NET: -500 mL    CAPILLARY BLOOD GLUCOSE          Physical Exam:  GENERAL: Not in distress. Alert    HEENT:  Normocephalic and atraumatic. PEARLA,EOMI  NECK: Supple.  No JVD.    CARDIOVASCULAR: RRR S1, S2. No murmur/rubs/gallop  LUNGS: BLAE+, no rales, no wheezing, no rhonchi.    ABDOMEN: ND. Soft,  NT, no guarding / rebound / rigidity. BS normoactive. No CVA tenderness.    BACK: No spine tenderness.  EXTREMITIES: no cyanosis, no clubbing, no edema.   SKIN: no rash. No skin break or ulcer. No cellulitis.  NEUROLOGIC: AAO*3.strength is symmetric, sensation intact, speech fluent.    PSYCHIATRIC: Calm.  No agitation.    Labs                        11.5   6.52  )-----------( 157      ( 13 Jul 2023 06:00 )             35.7     07-13    136  |  101  |  35<H>  ----------------------------<  102<H>  4.6   |  28  |  1.38<H>    Ca    8.5      13 Jul 2023 06:00  Mg     2.1     07-13    TPro  6.2  /  Alb  2.6<L>  /  TBili  0.5  /  DBili  x   /  AST  20  /  ALT  21  /  AlkPhos  92  07-13   Urinalysis Basic - ( 13 Jul 2023 06:00 )    Color: x / Appearance: x / SG: x / pH: x  Gluc: 102 mg/dL / Ketone: x  / Bili: x / Urobili: x   Blood: x / Protein: x / Nitrite: x   Leuk Esterase: x / RBC: x / WBC x   Sq Epi: x / Non Sq Epi: x / Bacteria: x                Dr. Seth Hospitalist Progress Note  [place] [id number]    Patient is a 83y old  Male who presents with a chief complaint of s/p subdural/subarachnoid hemorrhage (2023 15:07)    HPI:  This is an 83 year old male with PMH of A fib, HFrEF, HTN, and hx of AVR who presented to the [facility] on  after fall. He was found to have a subdural/subarachnoid hemorrhage. Hospital course complicated by A fib with RVR s/p amiodarone and metoprolol. Per cardio recs, recommended by EPS and pt a good candidate for Watchman implant in future and will require outpatient f/u. Course further complicated by COVID (+) on . Repeat PCR on  negative. Patient evaluated by PT/OT and recommended for acute inpatient rehab. Patient is medically stable for DC to [facility] on . (2023 15:07)    interval:  seen and examined  Chart reviewed  No overnight events  BM+  No pain  No complaints      ROS:  denied fever/chills/CP/SOB/cough/palpitation/dizziness/abdominal pian/nausea/vomiting/diarrhoea/constipation/dysuria/leg or calf pain/headaches.all other ROS neg    PAST MEDICAL HISTORY:  Aortic valve replaced     Atrial fibrillation     Congestive heart failure (CHF)     Hypertension.     PAST SURGICAL HISTORY:  Aortic valve replaced     S/P hernia repair     S/P total knee arthroplasty.    Social:  Smoking - former  EtOH - former  Drugs - denies    Allergy: NKDA    MEDICATIONS  (STANDING):  aMIOdarone    Tablet 200 milliGRAM(s) Oral daily  dapagliflozin 10 milliGRAM(s) Oral daily  digoxin     Tablet 125 MICROGram(s) Oral daily  levETIRAcetam 500 milliGRAM(s) Oral two times a day  metoprolol tartrate 50 milliGRAM(s) Oral two times a day  neomycin/bacitracin/polymyxin Topical Ointment 1 Application(s) Topical two times a day  senna 2 Tablet(s) Oral at bedtime  tamsulosin 0.4 milliGRAM(s) Oral at bedtime  zinc oxide 40% Paste 1 Application(s) Topical two times a day    MEDICATIONS  (PRN):  acetaminophen     Tablet .. 650 milliGRAM(s) Oral every 6 hours PRN Mild Pain (1 - 3)  melatonin 3 milliGRAM(s) Oral at bedtime PRN Insomnia  polyethylene glycol 3350 17 Gram(s) Oral daily PRN Constipation      T(C): 36.6 (23 @ 07:39), Max: 37 (23 @ 17:43)  HR: 105 (23 @ 07:39) (101 - 109)  BP: 106/67 (23 @ 07:39) (106/67 - 138/72)  RR: 16 (23 @ 07:39) (16 - 16)  SpO2: 98% (23 @ 07:39) (94% - 98%)    23 @ 07:01  -  23 @ 07:00  --------------------------------------------------------  IN: 0 mL / OUT: 500 mL / NET: -500 mL    CAPILLARY BLOOD GLUCOSE          Physical Exam:  GENERAL: Not in distress. Alert    HEENT:  Normocephalic and atraumatic. PEARLA,EOMI  NECK: Supple.  No JVD.    CARDIOVASCULAR: RRR S1, S2. SM+. no rubs/gallop  LUNGS: BLAE+, no rales, no wheezing, no rhonchi.    ABDOMEN: ND. Soft,  NT, no guarding / rebound / rigidity. BS normoactive. No CVA tenderness.    BACK: No spine tenderness.  EXTREMITIES: no cyanosis, no clubbing, no edema.   SKIN: no rash. laceration to back of head and right arm  NEUROLOGIC: AAO*3.strength is symmetric, sensation intact, speech fluent.    PSYCHIATRIC: Calm.  No agitation.    Labs                        11.5   6.52  )-----------( 157      ( 2023 06:00 )             35.7     07-    136  |  101  |  35<H>  ----------------------------<  102<H>  4.6   |  28  |  1.38<H>    Ca    8.5      2023 06:00  Mg     2.1     07-    TPro  6.2  /  Alb  2.6<L>  /  TBili  0.5  /  DBili  x   /  AST  20  /  ALT  21  /  AlkPhos  92  07-   Urinalysis Basic - ( 2023 06:00 )    Color: x / Appearance: x / SG: x / pH: x  Gluc: 102 mg/dL / Ketone: x  / Bili: x / Urobili: x   Blood: x / Protein: x / Nitrite: x   Leuk Esterase: x / RBC: x / WBC x   Sq Epi: x / Non Sq Epi: x / Bacteria: x    Past labs and imagin/10/2023    Glucose: 109    Sodium: 140    Potassium: 4.6    Blood Urea Nitrogen : 39    Creatinine: 1.59   Hematology:    07/10/2023    WBC: 7.08    HgB: 11    Hct: 34.4    Platelets: 158   2023 COVID-19 PCR:  Not detected   Radiology:     MRI brain: L. Frontal hematoma into subdural space; white matter dx    MRA brain: bilat PCA stenosis    CTH: increase of L SDH -> SAH.    CTH: L frontal subdural hematoma + skull fx. Lacerations to back of head +  right arm.    CTH 07/10: Overall similar examination compared to the CT head of 2023: Similar volume left convexity and frontal parafalcine subdural hemorrhage. Similar left anterior frontal hemorrhagic contusion with associated subarachnoid hemorrhage. Similar very small volume ventricular hemorrhage. No evidence of interval hemorrhage. No new mass effect, shift or hydrocephalus. Pregnant volume loss. Microvascular ischemic changes. Chronic bilateral cerebellar lacunar infarcts.    CTH/ CT C spine : 1: Nondisplaced right occipital skull fracture. 2: Contrecoup injury with left anterior falx and left frontal subdural hematoma. 3: Prominent degenerative changes of the cervical spine without evidence of acute traumatic injury.    CXR : Worsening bilateral multifocal and bibasilar opacities. Additional findings similar to prior study from 2023.    CXR : Small calcified density along the inferior glenoid similar in comparison to 2023. This may be related to chronic labral injury or prior fracture. No additional fracture or malalignment.      TTE  1. Left ventricle: The cavity size is normal. Normal thickness is present. Severe diffuse hypocontractility of the left ventricle is present. The left ventricular ejection fraction is moderately-severely reduced. The LVEF was determined by visual estimate. Left ventricular ejection  fraction is 30-35%. Qualitatively the left ventricular ejection function appears to be moderately to severely reduced. E/e' ratio is suggestive of elevated left atrial pressure. The left ventricular filling pattern is pseudonormal. Diastolic dysfunction is present.  2. Regional wall motion abnormality: Severe hypokinesis of the entire inferior and basal-mid inferolateral myocardium.  3. Right ventricle: The cavity size is mildly dilated. Right ventricular systolic function is mildly reduced. Calculated right ventricular systolic pressure is suggestive of mildly elevated pulmonary pressure.  4. Mitral valve: The annulus is moderately calcified and circumferentially. The leaflets are moderately thickened and severely calcified. The findings are consistent with moderate mitral valve stenosis. There is moderate to severe (3+) mitral valve regurgitation, directed centrally.  Restricted mitral systolic and diastolic leaflet motion, consistent with calcific degeneration.  5. Aortic valve: A bioprosthetic valve is present in the aortic position. There is no aortic stenosis. There is moderate (2+) valvular aortic regurgitation. There is no paravalvular aortic regurgitation. The calculated stroke volume index is reduced.  6. Tricuspid valve: The tricuspid leaflets are mildly thickened. There is severe (4+) tricuspid valve regurgitation.  7. Pericardium, extracardiac: There is no pericardial effusion. Bilateral pleural effusions are present.  Impressions: Compared to the study dated 2023, there is no significant change

## 2023-07-13 NOTE — DIETITIAN INITIAL EVALUATION ADULT - PERTINENT LABORATORY DATA
07-13    136  |  101  |  35<H>  ----------------------------<  102<H>  4.6   |  28  |  1.38<H>    Ca    8.5      13 Jul 2023 06:00  Mg     2.1     07-13    TPro  6.2  /  Alb  2.6<L>  /  TBili  0.5  /  DBili  x   /  AST  20  /  ALT  21  /  AlkPhos  92  07-13

## 2023-07-13 NOTE — DIETITIAN INITIAL EVALUATION ADULT - PERSON TAUGHT/METHOD
DASH/TLC/verbal instruction/teach back - (Patient repeats in own words)/patient instructed/son instructed

## 2023-07-13 NOTE — DIETITIAN INITIAL EVALUATION ADULT - ORAL INTAKE PTA/DIET HISTORY
Patient reports reduced PO intake x 1 week 2/2 dislike of food at previous hospitalization. Per son w/ 10lb weight loss x 3 months. NKFA nor food intolerances reported

## 2023-07-13 NOTE — DIETITIAN INITIAL EVALUATION ADULT - OTHER INFO
Reason for Admission: s/p subdural/subarachnoid hemorrhage  History of Present Illness:   This is an 83 year old male with PMH of A fib, HFrEF, HTN, and hx of AVR who presented to the [facility] on 7/6 after fall. He was found to have a subdural/subarachnoid hemorrhage. Hospital course complicated by A fib with RVR s/p amiodarone and metoprolol. Per cardio recs, recommended by EPS and pt a good candidate for Watchman implant in future and will require outpatient f/u. Course further complicated by COVID (+) on 7/6. Repeat PCR on 7/11 negative. Patient evaluated by PT/OT and recommended for acute inpatient rehab. Patient is medically stable for DC to [facility] on 7/12.      Visited w/ patient and Son at bedside. At this time patient reports improved PO intake. Reviewed preferences and menu alternatives; likes eggs, muffins, Yogurt, noted in Kardex. Recommend Ensure Plus High Protein Daily 8 oz (Provides 350 kcal, 20 grams of protein) TID to assist patient in meeting estimated energy requirements. No issues w/ dentition or chewing and swallowing current diet texture. Denies N/V/C/D, last BM 7/11 Per nursing flowsheets. Per son w/ 10lb weight loss x 3 months, .5 lbs (7/12). W/ observed subcutaneous fat loss and muscle depletion. Provided education on Heart-Healthy DASH/TLC meal pattern.

## 2023-07-13 NOTE — CONSULT NOTE ADULT - ASSESSMENT
83y with PMH of A fib, HFrEF, HTN, and hx of AVR who presented to the [facility] on 7/6 after fall, found to have SDH/SAH.  Hospital Course complicated by A fib RVR and COVID (+). Patient now admitted for a multidisciplinary rehab program. 07-12-23 @ 15:20      Debility s/p SAH/SDH  - Start comprehensive rehab  - c/w ASA  - c/w Keppra 500mg BID for seizure ppx  - Aspiration and fall precautions      New onset A fib  Chronic combined systolic and diastolic CHF  s/p AVR. Mod AR  Mod MS, severe MR, severe TR  HTN  - currently mild tachycardia  - Baseline EKG ordered  - encouraged hydration  - TTE at [facility]: EF 30-35%. RVF mildly reduced. mild pulmonary HTN, Mod MS, severe MR,. Mod AR. severe TR. no pericardial effusion, b/l pleural effusion  - c/w Amiodaron, Lopressor  - [id number] score 6. high risk of Ischaemic stroke.   - Will require outpatient cardio f/u and discussion about AC, patient good candidate for Watchman implant in future  - Ia nd Os  - daily standing weight    BUDDY/ATN  - Creatinine is improving. baseline unknown. Cr on july 10 at [facility] 1.59  - encouraged PO  - avoid IVF  - avoid nephrotoxins      BPH  - Continue Flomax 0.4mg nightly  - Bladder scan to r/o retention  - monitor UO      COVID (+)  - PCR positive 7/7, repeat on 7/11 negative  - Monitor    Skin / Pressure injury  - Skin assessment on admission performed: skin tear  x 2 to right forearm, blanchable redness to thoracic spine, moisture associated skin dermatitis to buttock, posterior scalp scab  - Turn q2 hours in bed while awake, air mattress  - nursing to monitor skin q Shift  - soft heel protectors  - skin barrier cream PRN      DVT prophylaxis:   - SCD  - doppler neg on admission  - avoid AC for recent SDH/SAH    Thanks for allowing us to see this patient. Hospitalist service will continue to follow patient progress with you. please call with any question    d/w rehab team      83y with PMH of A fib, HFrEF, HTN, and hx of AVR who presented to the [facility] on 7/6 after fall, found to have SDH/SAH.  Hospital Course complicated by A fib RVR and COVID (+). Patient now admitted for a multidisciplinary rehab program. 07-12-23 @ 15:20      Debility s/p SAH/SDH  -  MRI brain: L. Frontal hematoma into subdural space; white matter dx  - MRA brain: bilat PCA stenosis  - CTH: increase of L SDH -> SAH.  - CTH: L frontal subdural hematoma + skull fx. Lacerations to back of head + right arm.  - CTH 07/10: Overall similar examination compared to the CT head of July 7, 2023: Similar volume left convexity and frontal parafalcine subdural hemorrhage. Similar left anterior frontal hemorrhagic contusion with associated subarachnoid hemorrhage. Similar very small volume ventricular hemorrhage. No evidence of interval hemorrhage. No new mass effect, shift or hydrocephalus. Pregnant volume loss. Microvascular ischemic changes. Chronic bilateral cerebellar lacunar infarcts.  - CTH/ CT C spine 07/06: 1: Nondisplaced right occipital skull fracture. 2: Contrecoup injury with left anterior falx and left frontal subdural hematoma. 3: Prominent degenerative changes of the cervical spine without evidence of acute traumatic injury.  - Start comprehensive rehab  - c/w ASA  - c/w Keppra 500mg BID for seizure ppx  - Aspiration and fall precautions      New onset A fib  Chronic combined systolic and diastolic CHF  s/p AVR. Mod AR  Mod MS, severe MR, severe TR  HTN  - currently mild tachycardia  - Baseline EKG ordered  - encouraged hydration  - TTE at [facility]: EF 30-35%. RVF mildly reduced. mild pulmonary HTN, Mod MS, severe MR,. Mod AR. severe TR. no pericardial effusion, b/l pleural effusion  - c/w Amiodaron, Lopressor  - [id number] score 6. high risk of Ischaemic stroke.   - Will require outpatient cardio f/u and discussion about AC, patient good candidate for Watchman implant in future  - Ia nd Os  - daily standing weight    BUDDY/ATN  - Creatinine is improving. baseline unknown. Cr on july 10 at [facility] 1.59  - encouraged PO  - avoid IVF  - avoid nephrotoxins    B/l pleural effusion  - CXR 07/06: Worsening bilateral multifocal and bibasilar opacities. Additional findings similar to prior study from July 5, 2023.  - CXR 07/06: Small calcified density along the inferior glenoid similar in comparison to June 11, 2023. This may be related to chronic labral injury or prior fracture. No additional fracture or malalignment.  - maintaining sat on RA      BPH  - Continue Flomax 0.4mg nightly  - Bladder scan to r/o retention  - monitor UO      COVID (+)  - PCR positive 7/7, repeat on 7/11 negative  - Monitor    Skin / Pressure injury  - Skin assessment on admission performed: skin tear  x 2 to right forearm, blanchable redness to thoracic spine, moisture associated skin dermatitis to buttock, posterior scalp scab  - Turn q2 hours in bed while awake, air mattress  - nursing to monitor skin q Shift  - soft heel protectors  - skin barrier cream PRN      DVT prophylaxis:   - SCD  - doppler neg on admission  - avoid AC for recent SDH/SAH    Thanks for allowing us to see this patient. Hospitalist service will continue to follow patient progress with you. please call with any question    d/w rehab team

## 2023-07-14 LAB
ANION GAP SERPL CALC-SCNC: 5 MMOL/L — SIGNIFICANT CHANGE UP (ref 5–17)
BUN SERPL-MCNC: 35 MG/DL — HIGH (ref 7–23)
CALCIUM SERPL-MCNC: 8.5 MG/DL — SIGNIFICANT CHANGE UP (ref 8.4–10.5)
CHLORIDE SERPL-SCNC: 101 MMOL/L — SIGNIFICANT CHANGE UP (ref 96–108)
CO2 SERPL-SCNC: 29 MMOL/L — SIGNIFICANT CHANGE UP (ref 22–31)
CREAT SERPL-MCNC: 1.56 MG/DL — HIGH (ref 0.5–1.3)
EGFR: 44 ML/MIN/1.73M2 — LOW
GLUCOSE SERPL-MCNC: 110 MG/DL — HIGH (ref 70–99)
POTASSIUM SERPL-MCNC: 4.7 MMOL/L — SIGNIFICANT CHANGE UP (ref 3.5–5.3)
POTASSIUM SERPL-SCNC: 4.7 MMOL/L — SIGNIFICANT CHANGE UP (ref 3.5–5.3)
SODIUM SERPL-SCNC: 135 MMOL/L — SIGNIFICANT CHANGE UP (ref 135–145)

## 2023-07-14 PROCEDURE — 99232 SBSQ HOSP IP/OBS MODERATE 35: CPT

## 2023-07-14 RX ORDER — LANOLIN ALCOHOL/MO/W.PET/CERES
6 CREAM (GRAM) TOPICAL AT BEDTIME
Refills: 0 | Status: DISCONTINUED | OUTPATIENT
Start: 2023-07-14 | End: 2023-07-17

## 2023-07-14 RX ORDER — METOPROLOL TARTRATE 50 MG
25 TABLET ORAL
Refills: 0 | Status: DISCONTINUED | OUTPATIENT
Start: 2023-07-14 | End: 2023-07-15

## 2023-07-14 RX ADMIN — Medication 6 MILLIGRAM(S): at 20:48

## 2023-07-14 RX ADMIN — Medication 50 MILLIGRAM(S): at 05:15

## 2023-07-14 RX ADMIN — DAPAGLIFLOZIN 10 MILLIGRAM(S): 10 TABLET, FILM COATED ORAL at 11:31

## 2023-07-14 RX ADMIN — BACITRACIN ZINC NEOMYCIN SULFATE POLYMYXIN B SULFATE 1 APPLICATION(S): 400; 3.5; 5 OINTMENT TOPICAL at 05:28

## 2023-07-14 RX ADMIN — TAMSULOSIN HYDROCHLORIDE 0.4 MILLIGRAM(S): 0.4 CAPSULE ORAL at 20:47

## 2023-07-14 RX ADMIN — BACITRACIN ZINC NEOMYCIN SULFATE POLYMYXIN B SULFATE 1 APPLICATION(S): 400; 3.5; 5 OINTMENT TOPICAL at 19:09

## 2023-07-14 RX ADMIN — ZINC OXIDE 1 APPLICATION(S): 200 OINTMENT TOPICAL at 18:06

## 2023-07-14 RX ADMIN — Medication 125 MICROGRAM(S): at 05:15

## 2023-07-14 RX ADMIN — Medication 25 MILLIGRAM(S): at 18:07

## 2023-07-14 RX ADMIN — LEVETIRACETAM 500 MILLIGRAM(S): 250 TABLET, FILM COATED ORAL at 05:15

## 2023-07-14 RX ADMIN — LEVETIRACETAM 500 MILLIGRAM(S): 250 TABLET, FILM COATED ORAL at 18:07

## 2023-07-14 RX ADMIN — ZINC OXIDE 1 APPLICATION(S): 200 OINTMENT TOPICAL at 05:16

## 2023-07-14 RX ADMIN — AMIODARONE HYDROCHLORIDE 200 MILLIGRAM(S): 400 TABLET ORAL at 05:15

## 2023-07-14 NOTE — PROGRESS NOTE ADULT - ASSESSMENT
82 yo with PMH of A fib, HFrEF, HTN, and hx of AVR presented to the [facility] on 7/6 after fall, found to have SDH/SAH.  Hospital Course complicated by A fib RVR and COVID (+). Patient now admitted to [facility] for a multidisciplinary rehab program.     #SAH/SDH  - Continue Keppra 500 mg BID for seizure ppx  - Aspiration and fall precautions  - Rehab, pain and bowel regimen per primary team    #HFrEF  #A fib  - EF 30%  - Continue regimen of amiodarone 200 mg qd, digoxin 125 mcg qd, Lopressor 25 mg BID, Farxiga 10 mg qd  - Caution with IVF  - Outpatient cardio follow up for consideration of Watchman     #HTN  #Orthostatic Hypotension 7/14  - Lopressor dose lowered from 50 to 25 mg BID 7/14, added hold parameters  - Monitor BP    #CKD3  - Unknown baseline  - Avoid nephrotoxins  - Monitor    #BPH  - Continue Flomax 0.4 mg nightly    #COVID-19 Infection  - PCR positive 7/7, repeat on 7/11 negative  - Monitor     #DVT prophylaxis  - SCDs  - Dopplers on admission 6/13 negative for DVT

## 2023-07-14 NOTE — PROGRESS NOTE ADULT - SUBJECTIVE AND OBJECTIVE BOX
CC: Traumatic subdural hemorrhage     Today's Subjective & Objective Findings:  Patient seen and examined at bedside this AM.   Admits to poor sleep.   Last BM on 7/14, per patient.   Wishes to go home soon.   No other complaints at this time    Denies headache, dizziness, visual changes, chest pain, SOB/JOLLY, abdominal pain, nausea, vomiting, diarrhea, dysuria, numbness or tingling.    Therapy-- engaging, motivated    VITALS  T(C): 36.4 (07-14-23 @ 07:55), Max: 36.7 (07-13-23 @ 21:28)  HR: 113 (07-14-23 @ 07:55) (103 - 113)  BP: 110/66 (07-14-23 @ 07:55) (102/60 - 129/85)  RR: 16 (07-14-23 @ 07:55) (16 - 16)  SpO2: 96% (07-14-23 @ 07:55) (93% - 96%)      PHYSICAL EXAM:  Gen - NAD, Comfortable  HEENT - NCAT, EOMI, MMM  Neck - Supple, No limited ROM  Pulm - CTAB, No wheeze, No rhonchi, No crackles  Cardiovascular - IRREGULAR , S1S2  Chest - good chest expansion, good respiratory effort  Abdomen - Soft, NT/ND, +BS  Extremities - No Cyanosis, no clubbing, no edema, no calf tenderness  Neuro-     Cognitive - awake, alert, oriented to person, place, date, year, and situation.  Able to follow command     Communication - Fluent, Comprehensible     Attention: Intact     Memory: Recall 3 objects immediate and 3 min later     Cranial Nerves - CN 2-12 intact     Motor - No focal deficits.                     LEFT    UE - 4+/5                    RIGHT UE - 4+/5                    LEFT    LE - 4+/5                    RIGHT LE - 4+/5        Sensory - Intact  to LT      Reflexes - DTR Intact, No primitive reflexive     Coordination -  intact o     Tone - normal  MSK: Kyphosis  Psychiatric - Mood stable, Affect WNL  Skin:  skin tear  x 2 to right forearm, blanchable redness to thoracic spine, moisture associated skin dermatitis to buttock, posterior scalp scab      LABS:                        11.5   6.52  )-----------( 157      ( 13 Jul 2023 06:00 )             35.7     07-14    135  |  101  |  35<H>  ----------------------------<  110<H>  4.7   |  29  |  1.56<H>    Ca    8.5      14 Jul 2023 06:42  Mg     2.1     07-13    TPro  6.2  /  Alb  2.6<L>  /  TBili  0.5  /  DBili  x   /  AST  20  /  ALT  21  /  AlkPhos  92  07-13      Urinalysis Basic - ( 14 Jul 2023 06:42 )    Color: x / Appearance: x / SG: x / pH: x  Gluc: 110 mg/dL / Ketone: x  / Bili: x / Urobili: x   Blood: x / Protein: x / Nitrite: x   Leuk Esterase: x / RBC: x / WBC x   Sq Epi: x / Non Sq Epi: x / Bacteria: x    CAPILLARY BLOOD GLUCOSE        MEDICATIONS  (STANDING):  aMIOdarone    Tablet 200 milliGRAM(s) Oral daily  dapagliflozin 10 milliGRAM(s) Oral daily  digoxin     Tablet 125 MICROGram(s) Oral daily  levETIRAcetam 500 milliGRAM(s) Oral two times a day  melatonin 6 milliGRAM(s) Oral at bedtime  metoprolol tartrate 50 milliGRAM(s) Oral two times a day  neomycin/bacitracin/polymyxin Topical Ointment 1 Application(s) Topical two times a day  senna 2 Tablet(s) Oral at bedtime  tamsulosin 0.4 milliGRAM(s) Oral at bedtime  zinc oxide 40% Paste 1 Application(s) Topical two times a day    MEDICATIONS  (PRN):  acetaminophen     Tablet .. 650 milliGRAM(s) Oral every 6 hours PRN Mild Pain (1 - 3)  polyethylene glycol 3350 17 Gram(s) Oral daily PRN Constipation

## 2023-07-14 NOTE — PROGRESS NOTE ADULT - SUBJECTIVE AND OBJECTIVE BOX
Patient is a 83y old  Male who presents with a chief complaint of s/p subdural/subarachnoid hemorrhage (14 Jul 2023 10:33)    Patient seen and examined at bedside. No acute overnight events.   BP 97/58,  -> standing BP 71/41, HR 60.    ALLERGIES:  No Known Allergies    MEDICATIONS  (STANDING):  aMIOdarone    Tablet 200 milliGRAM(s) Oral daily  dapagliflozin 10 milliGRAM(s) Oral daily  digoxin     Tablet 125 MICROGram(s) Oral daily  levETIRAcetam 500 milliGRAM(s) Oral two times a day  melatonin 6 milliGRAM(s) Oral at bedtime  metoprolol tartrate 25 milliGRAM(s) Oral two times a day  neomycin/bacitracin/polymyxin Topical Ointment 1 Application(s) Topical two times a day  senna 2 Tablet(s) Oral at bedtime  tamsulosin 0.4 milliGRAM(s) Oral at bedtime  zinc oxide 40% Paste 1 Application(s) Topical two times a day    MEDICATIONS  (PRN):  acetaminophen     Tablet .. 650 milliGRAM(s) Oral every 6 hours PRN Mild Pain (1 - 3)  polyethylene glycol 3350 17 Gram(s) Oral daily PRN Constipation    Vital Signs Last 24 Hrs  T(F): 97.6 (14 Jul 2023 07:55), Max: 98 (13 Jul 2023 21:28)  HR: 113 (14 Jul 2023 07:55) (103 - 113)  BP: 110/66 (14 Jul 2023 07:55) (102/60 - 129/85)  RR: 16 (14 Jul 2023 14:09) (16 - 16)  SpO2: 94% (14 Jul 2023 14:09) (93% - 96%)    I&O's Summary    13 Jul 2023 07:01  -  14 Jul 2023 07:00  --------------------------------------------------------  IN: 1737 mL / OUT: 400 mL / NET: 1337 mL      BMI (kg/m2): 22.1 (07-12-23 @ 17:43)    PHYSICAL EXAM:  General: NAD  ENT: Moist mucous membranes  Neck: Supple, no JVD  Lungs: Clear to auscultation bilaterally, no wheezes  Cardio: S1 S2, regular rate and rhythm  Abdomen: Soft, nontender, nondistended, bowel sounds present  Extremities: No calf tenderness, no pitting edema    LABS:                        11.5   6.52  )-----------( 157      ( 13 Jul 2023 06:00 )             35.7       07-14    135  |  101  |  35  ----------------------------<  110  4.7   |  29  |  1.56    Ca    8.5      14 Jul 2023 06:42  Mg     2.1     07-13    TPro  6.2  /  Alb  2.6  /  TBili  0.5  /  DBili  x   /  AST  20  /  ALT  21  /  AlkPhos  92  07-13       Urinalysis Basic - ( 14 Jul 2023 06:42 )  Color: x / Appearance: x / SG: x / pH: x  Gluc: 110 mg/dL / Ketone: x  / Bili: x / Urobili: x   Blood: x / Protein: x / Nitrite: x   Leuk Esterase: x / RBC: x / WBC x   Sq Epi: x / Non Sq Epi: x / Bacteria: x      RADIOLOGY & ADDITIONAL TESTS:     Care Discussed with Consultants/Other Providers: PM&R

## 2023-07-14 NOTE — PROGRESS NOTE ADULT - ASSESSMENT
Assessment/Plan:  CHADD VALDIVIA is a 83y with PMH of A fib, HFrEF, HTN, and hx of AVR who presented to the [facility] on 7/6 after fall, found to have SDH/SAH.  Hospital Course complicated by A fib RVR and COVID (+). Patient now admitted for a multidisciplinary rehab program. 07-12-23 @ 15:20    * Monitor bp * Continue to monitor rehab progress *     SAH/SDH  - Continue Keppra 500mg BID for seizure ppx  - Aspiration and fall precautions  - Start comprehensive rehab program of PT/OT/SLP - 3 hours a day, 5 days a week. P&O as needed     A fib  - Course c/b A fib with RVR  - Will require outpatient cardio f/u, patient good candidate for Watchman implant in future  - Continue amiodarone PO 200mg qd, Lopressor 50mg BID, Farxiga 10mg qd     HFrEF  - EF 30%  - Continue amiodarone 200mg daily  - Caution with excessive fluids    COVID (+)  - PCR positive 7/7, repeat on 7/11 negative  - Monitor    HTN  - Continue lopressor 50mg BID  - Monitor BP    BPH  - Continue Flomax 0.4mg nightly    Pain  - Tylenol PRN    Sleep  - Melatonin PRN     GI / Bowel  - Senna qHS  - Miralax PRN Daily  - GI ppx: Protonix     / Bladder  - Currently patient voids independently. Check PVRs on admission. Straight cath for > 400ccs    Skin / Pressure injury  - Skin assessment on admission performed: skin tear  x 2 to right forearm, blanchable redness to thoracic spine, moisture associated skin dermatitis to buttock, posterior scalp scab  - Turn q2 hours in bed while awake, air mattress  - nursing to monitor skin q Shift  - soft heel protectors  - skin barrier cream PRN    Diet/Dysphagia:  - Diet Consistency: Regular  - Aspiration Precautions  - SLP consult for swallow function evaluation and treatment  - Nutrition consult    DVT prophylaxis:   - SCD  - f/u Doppler on admission    Outpatient Follow-up:  Specialty and Name of physician  Nephrology  Anshu Martino MD  [address]  [facility] [id number] [phone]    [facility]  [address], [address]  [place], [place] [address] [phone]    Electrophysiology  Jason Ortega MD  [address] [place] [address]  [phone]    Eliseo Braxton MD  [address] [facility] [address]  [phone]    Code Status/Emergency Contact:    ---------------

## 2023-07-15 LAB
ANION GAP SERPL CALC-SCNC: 4 MMOL/L — LOW (ref 5–17)
BUN SERPL-MCNC: 30 MG/DL — HIGH (ref 7–23)
CALCIUM SERPL-MCNC: 8.6 MG/DL — SIGNIFICANT CHANGE UP (ref 8.4–10.5)
CHLORIDE SERPL-SCNC: 99 MMOL/L — SIGNIFICANT CHANGE UP (ref 96–108)
CO2 SERPL-SCNC: 31 MMOL/L — SIGNIFICANT CHANGE UP (ref 22–31)
CREAT SERPL-MCNC: 1.54 MG/DL — HIGH (ref 0.5–1.3)
EGFR: 45 ML/MIN/1.73M2 — LOW
GLUCOSE SERPL-MCNC: 91 MG/DL — SIGNIFICANT CHANGE UP (ref 70–99)
POTASSIUM SERPL-MCNC: 4.8 MMOL/L — SIGNIFICANT CHANGE UP (ref 3.5–5.3)
POTASSIUM SERPL-SCNC: 4.8 MMOL/L — SIGNIFICANT CHANGE UP (ref 3.5–5.3)
SODIUM SERPL-SCNC: 134 MMOL/L — LOW (ref 135–145)

## 2023-07-15 PROCEDURE — 99232 SBSQ HOSP IP/OBS MODERATE 35: CPT

## 2023-07-15 RX ORDER — METOPROLOL TARTRATE 50 MG
12.5 TABLET ORAL
Refills: 0 | Status: DISCONTINUED | OUTPATIENT
Start: 2023-07-15 | End: 2023-07-17

## 2023-07-15 RX ADMIN — LEVETIRACETAM 500 MILLIGRAM(S): 250 TABLET, FILM COATED ORAL at 05:26

## 2023-07-15 RX ADMIN — ZINC OXIDE 1 APPLICATION(S): 200 OINTMENT TOPICAL at 18:43

## 2023-07-15 RX ADMIN — Medication 125 MICROGRAM(S): at 05:26

## 2023-07-15 RX ADMIN — Medication 25 MILLIGRAM(S): at 05:26

## 2023-07-15 RX ADMIN — LEVETIRACETAM 500 MILLIGRAM(S): 250 TABLET, FILM COATED ORAL at 17:49

## 2023-07-15 RX ADMIN — AMIODARONE HYDROCHLORIDE 200 MILLIGRAM(S): 400 TABLET ORAL at 05:27

## 2023-07-15 RX ADMIN — BACITRACIN ZINC NEOMYCIN SULFATE POLYMYXIN B SULFATE 1 APPLICATION(S): 400; 3.5; 5 OINTMENT TOPICAL at 05:28

## 2023-07-15 RX ADMIN — BACITRACIN ZINC NEOMYCIN SULFATE POLYMYXIN B SULFATE 1 APPLICATION(S): 400; 3.5; 5 OINTMENT TOPICAL at 18:43

## 2023-07-15 RX ADMIN — Medication 12.5 MILLIGRAM(S): at 17:49

## 2023-07-15 RX ADMIN — ZINC OXIDE 1 APPLICATION(S): 200 OINTMENT TOPICAL at 05:28

## 2023-07-15 RX ADMIN — TAMSULOSIN HYDROCHLORIDE 0.4 MILLIGRAM(S): 0.4 CAPSULE ORAL at 21:50

## 2023-07-15 RX ADMIN — DAPAGLIFLOZIN 10 MILLIGRAM(S): 10 TABLET, FILM COATED ORAL at 11:30

## 2023-07-15 RX ADMIN — Medication 6 MILLIGRAM(S): at 21:50

## 2023-07-15 NOTE — PROGRESS NOTE ADULT - SUBJECTIVE AND OBJECTIVE BOX
Cc: Gait dysfunction    HPI: Patient with no new medical issues today.  Reports insomnia, despite being on melatonin 3mg    Low BP with symptoms noted with stand from sitting during therapy    ROS  Pain controlled, no chest pain, no N/V, no Fevers/Chills. Insomnia present  LBM 7/15  Has been tolerating rehabilitation program.    MEDICATIONS  (STANDING):  aMIOdarone    Tablet 200 milliGRAM(s) Oral daily  dapagliflozin 10 milliGRAM(s) Oral daily  digoxin     Tablet 125 MICROGram(s) Oral daily  levETIRAcetam 500 milliGRAM(s) Oral two times a day  melatonin 6 milliGRAM(s) Oral at bedtime  metoprolol tartrate 12.5 milliGRAM(s) Oral two times a day  neomycin/bacitracin/polymyxin Topical Ointment 1 Application(s) Topical two times a day  senna 2 Tablet(s) Oral at bedtime  tamsulosin 0.4 milliGRAM(s) Oral at bedtime  zinc oxide 40% Paste 1 Application(s) Topical two times a day    MEDICATIONS  (PRN):  acetaminophen     Tablet .. 650 milliGRAM(s) Oral every 6 hours PRN Mild Pain (1 - 3)  polyethylene glycol 3350 17 Gram(s) Oral daily PRN Constipation      Vital Signs Last 24 Hrs  T(C): 36.4 (15 Jul 2023 07:50), Max: 36.9 (14 Jul 2023 20:45)  T(F): 97.5 (15 Jul 2023 07:50), Max: 98.4 (14 Jul 2023 20:45)  HR: 101 (15 Jul 2023 07:50) (101 - 121)  BP: 105/68 (15 Jul 2023 07:50) (105/68 - 123/78)  BP(mean): --  RR: 16 (15 Jul 2023 07:50) (16 - 16)  SpO2: 97% (15 Jul 2023 07:50) (94% - 97%)    Parameters below as of 15 Jul 2023 07:50  Patient On (Oxygen Delivery Method): room air      EXAM  In NAD  HEENT- EOMI  Heart- RRR, S1S2  Lungs- Clear   Abd- + BS, NT  Ext- No calf pain    Neuro- Exam alert and oriented x 3  Antigravity all extremities     07-15    134<L>  |  99  |  30<H>  ----------------------------<  91  4.8   |  31  |  1.54<H>    Ca    8.6      15 Jul 2023 06:06    CAPILLARY BLOOD GLUCOSE    Urinalysis Basic - ( 15 Jul 2023 06:06 )    Color: x / Appearance: x / SG: x / pH: x  Gluc: 91 mg/dL / Ketone: x  / Bili: x / Urobili: x   Blood: x / Protein: x / Nitrite: x   Leuk Esterase: x / RBC: x / WBC x   Sq Epi: x / Non Sq Epi: x / Bacteria: x    Imp: Patient with diagnosis of Subdural hematoma  post fall admitted for comprehensive acute rehabilitation.  orthostatic hypotension in therapy  A fib   insomnia    Plan:  - Continue therapies  - DVT prophylaxis--SCD  -Reduced Metoprolol dose and revise the parameters for meds admin  --Increased melatonin dose  - Skin- Turn q2h, check skin daily  - Continue current medications;   - Patient is stable to continue current rehabilitation program.    Cc: Gait dysfunction    HPI: Patient seen and examined  Reports insomnia, despite being on melatonin 3mg    Low BP with symptoms noted with stand from sitting during therapy    ROS  Pain controlled, no chest pain, no N/V, no Fevers/Chills. Insomnia present  LBM 7/15  Has been tolerating rehabilitation program.    MEDICATIONS  (STANDING):  aMIOdarone    Tablet 200 milliGRAM(s) Oral daily  dapagliflozin 10 milliGRAM(s) Oral daily  digoxin     Tablet 125 MICROGram(s) Oral daily  levETIRAcetam 500 milliGRAM(s) Oral two times a day  melatonin 6 milliGRAM(s) Oral at bedtime  metoprolol tartrate 12.5 milliGRAM(s) Oral two times a day  neomycin/bacitracin/polymyxin Topical Ointment 1 Application(s) Topical two times a day  senna 2 Tablet(s) Oral at bedtime  tamsulosin 0.4 milliGRAM(s) Oral at bedtime  zinc oxide 40% Paste 1 Application(s) Topical two times a day    MEDICATIONS  (PRN):  acetaminophen     Tablet .. 650 milliGRAM(s) Oral every 6 hours PRN Mild Pain (1 - 3)  polyethylene glycol 3350 17 Gram(s) Oral daily PRN Constipation      Vital Signs Last 24 Hrs  T(C): 36.4 (15 Jul 2023 07:50), Max: 36.9 (14 Jul 2023 20:45)  T(F): 97.5 (15 Jul 2023 07:50), Max: 98.4 (14 Jul 2023 20:45)  HR: 101 (15 Jul 2023 07:50) (101 - 121)  BP: 105/68 (15 Jul 2023 07:50) (105/68 - 123/78)  BP(mean): --  RR: 16 (15 Jul 2023 07:50) (16 - 16)  SpO2: 97% (15 Jul 2023 07:50) (94% - 97%)    Parameters below as of 15 Jul 2023 07:50  Patient On (Oxygen Delivery Method): room air      EXAM  In NAD  HEENT- EOMI  Heart- RRR, S1S2  Lungs- Clear   Abd- + BS, NT  Ext- No calf pain    Neuro- Exam alert and oriented x 3  Antigravity all extremities     07-15    134<L>  |  99  |  30<H>  ----------------------------<  91  4.8   |  31  |  1.54<H>    Ca    8.6      15 Jul 2023 06:06    CAPILLARY BLOOD GLUCOSE    Urinalysis Basic - ( 15 Jul 2023 06:06 )    Color: x / Appearance: x / SG: x / pH: x  Gluc: 91 mg/dL / Ketone: x  / Bili: x / Urobili: x   Blood: x / Protein: x / Nitrite: x   Leuk Esterase: x / RBC: x / WBC x   Sq Epi: x / Non Sq Epi: x / Bacteria: x    Imp: Patient with diagnosis of Subdural hematoma  post fall admitted for comprehensive acute rehabilitation.  orthostatic hypotension in therapy  A fib   insomnia    Plan:  - Continue therapies  - DVT prophylaxis--SCD  -Reduced Metoprolol dose and revise the parameters for meds admin  --Increased melatonin dose  - Skin- Turn q2h, check skin daily  - Continue current medications;   - Patient is stable to continue current rehabilitation program.

## 2023-07-16 PROCEDURE — 99232 SBSQ HOSP IP/OBS MODERATE 35: CPT

## 2023-07-16 RX ADMIN — Medication 125 MICROGRAM(S): at 05:23

## 2023-07-16 RX ADMIN — Medication 12.5 MILLIGRAM(S): at 18:06

## 2023-07-16 RX ADMIN — DAPAGLIFLOZIN 10 MILLIGRAM(S): 10 TABLET, FILM COATED ORAL at 11:17

## 2023-07-16 RX ADMIN — ZINC OXIDE 1 APPLICATION(S): 200 OINTMENT TOPICAL at 18:08

## 2023-07-16 RX ADMIN — LEVETIRACETAM 500 MILLIGRAM(S): 250 TABLET, FILM COATED ORAL at 05:23

## 2023-07-16 RX ADMIN — LEVETIRACETAM 500 MILLIGRAM(S): 250 TABLET, FILM COATED ORAL at 18:07

## 2023-07-16 RX ADMIN — Medication 6 MILLIGRAM(S): at 21:17

## 2023-07-16 RX ADMIN — BACITRACIN ZINC NEOMYCIN SULFATE POLYMYXIN B SULFATE 1 APPLICATION(S): 400; 3.5; 5 OINTMENT TOPICAL at 05:23

## 2023-07-16 RX ADMIN — ZINC OXIDE 1 APPLICATION(S): 200 OINTMENT TOPICAL at 05:23

## 2023-07-16 RX ADMIN — AMIODARONE HYDROCHLORIDE 200 MILLIGRAM(S): 400 TABLET ORAL at 05:23

## 2023-07-16 RX ADMIN — TAMSULOSIN HYDROCHLORIDE 0.4 MILLIGRAM(S): 0.4 CAPSULE ORAL at 21:17

## 2023-07-16 RX ADMIN — BACITRACIN ZINC NEOMYCIN SULFATE POLYMYXIN B SULFATE 1 APPLICATION(S): 400; 3.5; 5 OINTMENT TOPICAL at 18:07

## 2023-07-16 NOTE — PROGRESS NOTE ADULT - ASSESSMENT
82 yo with PMH of A fib, HFrEF, HTN, and hx of AVR presented to [facility] on 7/6 after fall, found to have SDH/SAH. Hospital Course complicated by A fib RVR and COVID (+). Patient now admitted to [facility] for a multidisciplinary rehab program.     #SAH/SDH  - Continue Keppra 500 mg BID for seizure ppx  - Eliquis (for AF) on hold  - Aspiration and fall precautions  - Rehab, pain and bowel regimen per primary team    #HFrEF  #A fib  - EF 30%  - Continue regimen of amiodarone 200 mg qd, digoxin 125 mcg qd, Lopressor 25 mg BID, Farxiga 10 mg qd  - Caution with IVF  - Outpatient cardio follow up for consideration of Watchman - [facility] Dr. Braxton    #HTN  #Orthostatic Hypotension 7/14  - Lopressor dose lowered from 50 to 25 mg BID 7/14, added hold parameters  - Monitor BP    #CKD3  - Unknown baseline  - Avoid nephrotoxins  - Monitor    #BPH  - Continue Flomax 0.4 mg nightly    #COVID-19 Infection  - PCR positive 7/7, repeat on 7/11 negative  - Monitor     #DVT prophylaxis  - SCDs  - Dopplers on admission 6/13 negative for DVT

## 2023-07-16 NOTE — PROGRESS NOTE ADULT - SUBJECTIVE AND OBJECTIVE BOX
Patient is a 83y old  Male who presents with a chief complaint of s/p subdural/subarachnoid hemorrhage (15 Jul 2023 14:16)    Patient seen and examined with daughter at bedside. No acute overnight events.     ALLERGIES:  No Known Allergies    MEDICATIONS  (STANDING):  aMIOdarone    Tablet 200 milliGRAM(s) Oral daily  dapagliflozin 10 milliGRAM(s) Oral daily  digoxin     Tablet 125 MICROGram(s) Oral daily  levETIRAcetam 500 milliGRAM(s) Oral two times a day  melatonin 6 milliGRAM(s) Oral at bedtime  metoprolol tartrate 12.5 milliGRAM(s) Oral two times a day  neomycin/bacitracin/polymyxin Topical Ointment 1 Application(s) Topical two times a day  senna 2 Tablet(s) Oral at bedtime  tamsulosin 0.4 milliGRAM(s) Oral at bedtime  zinc oxide 40% Paste 1 Application(s) Topical two times a day    MEDICATIONS  (PRN):  acetaminophen     Tablet .. 650 milliGRAM(s) Oral every 6 hours PRN Mild Pain (1 - 3)  polyethylene glycol 3350 17 Gram(s) Oral daily PRN Constipation    Vital Signs Last 24 Hrs  T(F): 97.7 (16 Jul 2023 07:53), Max: 98.6 (15 Jul 2023 21:49)  HR: 109 (16 Jul 2023 05:20) (79 - 109)  BP: 103/72 (16 Jul 2023 07:53) (102/67 - 128/78)  RR: 16 (16 Jul 2023 07:53) (16 - 16)  SpO2: 94% (16 Jul 2023 07:53) (92% - 94%)    I&O's Summary    15 Jul 2023 07:01  -  16 Jul 2023 07:00  --------------------------------------------------------  IN: 840 mL / OUT: 2100 mL / NET: -1260 mL    16 Jul 2023 07:01  -  16 Jul 2023 11:59  --------------------------------------------------------  IN: 0 mL / OUT: 400 mL / NET: -400 mL      BMI (kg/m2): 22.1 (07-12-23 @ 17:43)    PHYSICAL EXAM:  General: NAD, awake, alert  ENT: Moist mucous membranes  Neck: Supple, no JVD  Lungs: Clear to auscultation bilaterally, no wheezes  Cardio: S1 S2, regular rate and rhythm  Abdomen: Soft, nontender, nondistended, bowel sounds present  Extremities: No calf tenderness, no pitting edema    LABS:      07-15    134  |  99  |  30  ----------------------------<  91  4.8   |  31  |  1.54    Ca    8.6      15 Jul 2023 06:06         Urinalysis Basic - ( 15 Jul 2023 06:06 )    Color: x / Appearance: x / SG: x / pH: x  Gluc: 91 mg/dL / Ketone: x  / Bili: x / Urobili: x   Blood: x / Protein: x / Nitrite: x   Leuk Esterase: x / RBC: x / WBC x   Sq Epi: x / Non Sq Epi: x / Bacteria: x      RADIOLOGY & ADDITIONAL TESTS:     Care Discussed with Consultants/Other Providers: PM&R

## 2023-07-16 NOTE — PROGRESS NOTE ADULT - SUBJECTIVE AND OBJECTIVE BOX
Cc: Gait dysfunction    HPI: Patient with no new medical issues today.  Seen with daughter Ms Tavarez at bed side  Patient reports better engagement in therapy yesterday  Postural dizziness less prominent  Wt 148 lb    Daughter gave background details on patient's medical background  Cardiology--Dr Diana at [facility]  She reports that patient has hx of hypotension with tachycardia even preceeding his fall and subsequent hosp admission  Recent functional decline  Daughter wants to discuss dc plan/date with primary care team   PCP Dr Shannon    Pain controlled, no chest pain, no N/V, no Fevers/Chills. No other new ROS  Has been tolerating rehabilitation program.    MEDICATIONS  (STANDING):  aMIOdarone    Tablet 200 milliGRAM(s) Oral daily  dapagliflozin 10 milliGRAM(s) Oral daily  digoxin     Tablet 125 MICROGram(s) Oral daily  levETIRAcetam 500 milliGRAM(s) Oral two times a day  melatonin 6 milliGRAM(s) Oral at bedtime  metoprolol tartrate 12.5 milliGRAM(s) Oral two times a day  neomycin/bacitracin/polymyxin Topical Ointment 1 Application(s) Topical two times a day  senna 2 Tablet(s) Oral at bedtime  tamsulosin 0.4 milliGRAM(s) Oral at bedtime  zinc oxide 40% Paste 1 Application(s) Topical two times a day  MEDICATIONS  (PRN):  acetaminophen     Tablet .. 650 milliGRAM(s) Oral every 6 hours PRN Mild Pain (1 - 3)  polyethylene glycol 3350 17 Gram(s) Oral daily PRN Constipation      Vital Signs Last 24 Hrs  T(C): 36.5 (16 Jul 2023 07:53), Max: 37 (15 Jul 2023 21:49)  T(F): 97.7 (16 Jul 2023 07:53), Max: 98.6 (15 Jul 2023 21:49)  HR: 109 (16 Jul 2023 05:20) (79 - 109)  BP: 103/72 (16 Jul 2023 07:53) (102/67 - 128/78)  RR: 16 (16 Jul 2023 07:53) (16 - 16)  SpO2: 94% (16 Jul 2023 07:53) (92% - 94%)  Parameters below as of 16 Jul 2023 07:53  Patient On (Oxygen Delivery Method): room air      EXAM  In NAD  HEENT- EOMI  Heart- RRR, S1S2  Lungs- CTA bl.  Abd- + BS, NT  Ext- No calf tenderness    Neuro- Exam--alert, in no distress      07-15    134<L>  |  99  |  30<H>  ----------------------------<  91  4.8   |  31  |  1.54<H>    Ca    8.6      15 Jul 2023 06:06    CAPILLARY BLOOD GLUCOSE  Urinalysis Basic - ( 15 Jul 2023 06:06 )  Color: x / Appearance: x / SG: x / pH: x  Gluc: 91 mg/dL / Ketone: x  / Bili: x / Urobili: x   Blood: x / Protein: x / Nitrite: x   Leuk Esterase: x / RBC: x / WBC x   Sq Epi: x / Non Sq Epi: x / Bacteria: x    Imp: Patient with diagnosis of Subdural hematoma admitted for comprehensive acute rehabilitation.  Discussed details of review with patient's daughter    Plan:  - Continue therapies  - DVT prophylaxis--SCD   --A fib--c/w amiodarone, digoxin and metoprolol  - Continue current medications;  - Patient is stable to continue current rehabilitation program.

## 2023-07-17 LAB
ANION GAP SERPL CALC-SCNC: 5 MMOL/L — SIGNIFICANT CHANGE UP (ref 5–17)
BUN SERPL-MCNC: 31 MG/DL — HIGH (ref 7–23)
CALCIUM SERPL-MCNC: 8.7 MG/DL — SIGNIFICANT CHANGE UP (ref 8.4–10.5)
CHLORIDE SERPL-SCNC: 99 MMOL/L — SIGNIFICANT CHANGE UP (ref 96–108)
CO2 SERPL-SCNC: 30 MMOL/L — SIGNIFICANT CHANGE UP (ref 22–31)
CREAT SERPL-MCNC: 1.53 MG/DL — HIGH (ref 0.5–1.3)
EGFR: 45 ML/MIN/1.73M2 — LOW
GLUCOSE SERPL-MCNC: 94 MG/DL — SIGNIFICANT CHANGE UP (ref 70–99)
HCT VFR BLD CALC: 37 % — LOW (ref 39–50)
HGB BLD-MCNC: 12.1 G/DL — LOW (ref 13–17)
MAGNESIUM SERPL-MCNC: 1.9 MG/DL — SIGNIFICANT CHANGE UP (ref 1.6–2.6)
MCHC RBC-ENTMCNC: 32.7 GM/DL — SIGNIFICANT CHANGE UP (ref 32–36)
MCHC RBC-ENTMCNC: 33.5 PG — SIGNIFICANT CHANGE UP (ref 27–34)
MCV RBC AUTO: 102.5 FL — HIGH (ref 80–100)
NRBC # BLD: 0 /100 WBCS — SIGNIFICANT CHANGE UP (ref 0–0)
PLATELET # BLD AUTO: 193 K/UL — SIGNIFICANT CHANGE UP (ref 150–400)
POTASSIUM SERPL-MCNC: 4.6 MMOL/L — SIGNIFICANT CHANGE UP (ref 3.5–5.3)
POTASSIUM SERPL-SCNC: 4.6 MMOL/L — SIGNIFICANT CHANGE UP (ref 3.5–5.3)
RBC # BLD: 3.61 M/UL — LOW (ref 4.2–5.8)
RBC # FLD: 14.6 % — HIGH (ref 10.3–14.5)
SODIUM SERPL-SCNC: 134 MMOL/L — LOW (ref 135–145)
WBC # BLD: 7.57 K/UL — SIGNIFICANT CHANGE UP (ref 3.8–10.5)
WBC # FLD AUTO: 7.57 K/UL — SIGNIFICANT CHANGE UP (ref 3.8–10.5)

## 2023-07-17 PROCEDURE — 99233 SBSQ HOSP IP/OBS HIGH 50: CPT

## 2023-07-17 PROCEDURE — 99232 SBSQ HOSP IP/OBS MODERATE 35: CPT

## 2023-07-17 RX ORDER — METOPROLOL TARTRATE 50 MG
12.5 TABLET ORAL AT BEDTIME
Refills: 0 | Status: DISCONTINUED | OUTPATIENT
Start: 2023-07-18 | End: 2023-07-18

## 2023-07-17 RX ORDER — LANOLIN ALCOHOL/MO/W.PET/CERES
9 CREAM (GRAM) TOPICAL AT BEDTIME
Refills: 0 | Status: DISCONTINUED | OUTPATIENT
Start: 2023-07-17 | End: 2023-08-09

## 2023-07-17 RX ADMIN — LEVETIRACETAM 500 MILLIGRAM(S): 250 TABLET, FILM COATED ORAL at 17:02

## 2023-07-17 RX ADMIN — ZINC OXIDE 1 APPLICATION(S): 200 OINTMENT TOPICAL at 05:02

## 2023-07-17 RX ADMIN — DAPAGLIFLOZIN 10 MILLIGRAM(S): 10 TABLET, FILM COATED ORAL at 12:04

## 2023-07-17 RX ADMIN — Medication 12.5 MILLIGRAM(S): at 06:23

## 2023-07-17 RX ADMIN — LEVETIRACETAM 500 MILLIGRAM(S): 250 TABLET, FILM COATED ORAL at 05:02

## 2023-07-17 RX ADMIN — AMIODARONE HYDROCHLORIDE 200 MILLIGRAM(S): 400 TABLET ORAL at 05:01

## 2023-07-17 RX ADMIN — Medication 125 MICROGRAM(S): at 05:02

## 2023-07-17 RX ADMIN — TAMSULOSIN HYDROCHLORIDE 0.4 MILLIGRAM(S): 0.4 CAPSULE ORAL at 20:36

## 2023-07-17 RX ADMIN — BACITRACIN ZINC NEOMYCIN SULFATE POLYMYXIN B SULFATE 1 APPLICATION(S): 400; 3.5; 5 OINTMENT TOPICAL at 17:03

## 2023-07-17 RX ADMIN — ZINC OXIDE 1 APPLICATION(S): 200 OINTMENT TOPICAL at 17:03

## 2023-07-17 RX ADMIN — Medication 9 MILLIGRAM(S): at 20:36

## 2023-07-17 RX ADMIN — BACITRACIN ZINC NEOMYCIN SULFATE POLYMYXIN B SULFATE 1 APPLICATION(S): 400; 3.5; 5 OINTMENT TOPICAL at 05:02

## 2023-07-17 NOTE — PROGRESS NOTE ADULT - SUBJECTIVE AND OBJECTIVE BOX
CC: Traumatic subdural hemorrhage     Today's Subjective & Objective Findings:  Patient seen and examined, observed in therapy and d/w therapists  He continues to report insomnia, hx of chronic insomnia, preceeding hosp care and had been on Ambien, which contributed to his fall  Hence now off ambien and insomnia recurring  On low dose melatonin and agrees to dose increase  Tolerating oral diet  Reports no acute pain symptoms    Denies headache, dizziness, visual changes, chest pain, SOB/JOLLY, abdominal pain, nausea, vomiting, diarrhea, dysuria, numbness or tingling.  LBM 7/16    Therapy-- engaging, motivated  Observed in therapy, BP stable with abdominal binder (no TEDS), Systolic above 100  Worked on LE motor strengthening and balance exercises    Labs--independently reviewed--Unremarkable    Vital Signs Last 24 Hrs  T(C): 36.4 (17 Jul 2023 07:05), Max: 36.6 (16 Jul 2023 21:15)  T(F): 97.6 (17 Jul 2023 07:05), Max: 97.9 (16 Jul 2023 21:15)  HR: 100 (17 Jul 2023 07:05) (96 - 116)  BP: 124/76 (17 Jul 2023 06:21) (95/61 - 124/76)  RR: 16 (17 Jul 2023 07:05) (16 - 16)  SpO2: 95% (17 Jul 2023 07:05) (95% - 96%)  O2 Parameters below as of 17 Jul 2023 07:05  Patient On (Oxygen Delivery Method): room air      PHYSICAL EXAM:  Gen -  Comfortable,   HEENT - NAD  Neck - Supple, No limited ROM  Pulm - CTAB, No wheeze, No rhonchi, No crackles  Cardiovascular - IRREGULAR , S1S2  Chest - good chest expansion, good respiratory effort  Abdomen - Soft, NT/ND, +BS  Extremities - No Cyanosis, no clubbing, no edema, no calf tenderness      Neuro-  AAO X 3, interactive, cheerful      Cranial Nerves - CN 2-12 intact     Motor - No focal deficits.                     LEFT    UE - 4+/5                    RIGHT UE - 4+/5                    LEFT    LE - 4+/5                    RIGHT LE - 4+/5        Sensory - Intact  to LT      Reflexes - DTR Intact, No primitive reflexive     Coordination -  intact o     Tone - normal  MSK: Kyphosis  Psychiatric - Mood stable, Affect WNL  Skin:  skin tear  x 2 to right forearm, blanchable redness to thoracic spine, moisture associated skin dermatitis to buttock, scab on occipital region, no bleeding  No edema  Marked wt loss     RECENT LABS/IMAGING                        12.1   7.57  )-----------( 193      ( 17 Jul 2023 06:33 )             37.0     07-17    134<L>  |  99  |  31<H>  ----------------------------<  94  4.6   |  30  |  1.53<H>    Ca    8.7      17 Jul 2023 06:33  Mg     1.9     07-17      Urinalysis Basic - ( 17 Jul 2023 06:33 )    Color: x / Appearance: x / SG: x / pH: x  Gluc: 94 mg/dL / Ketone: x  / Bili: x / Urobili: x   Blood: x / Protein: x / Nitrite: x   Leuk Esterase: x / RBC: x / WBC x   Sq Epi: x / Non Sq Epi: x / Bacteria: x

## 2023-07-17 NOTE — PROGRESS NOTE ADULT - ASSESSMENT
82 yo with PMH of A fib, HFrEF, HTN, and hx of AVR presented to [facility] on 7/6 after fall, found to have SDH/SAH. Hospital Course complicated by A fib RVR and COVID (+). Patient now admitted to [facility] for a multidisciplinary rehab program.     #SAH/SDH  - Continue Keppra 500 mg BID for seizure ppx  - Eliquis (for AF) on hold  - Aspiration and fall precautions  - Rehab, pain and bowel regimen per primary team    #HFrEF  # chronic A fib  #HTN  # orthostatic hypotension  - EF 30%  - on amiodarone 200 mg qd, digoxin 125 mcg qd, Lopressor 25 mg BID, Farxiga 10 mg qd  - metoprolol dose was changed form 50 to 25 mg BID on 7/14 for OH, changed further to 12.5 mg BID on 7/15. changed to Metoprolol ER 12,5 mg daily today 7/17 and place don h/s instead of am [ guideline directed therapy]  - hold Farxiga if further postural hypotension  - not a candidate for MRA,ACE/ARBS/ARNI due to OH  - Caution with IVF  - Outpatient cardio follow up for consideration of Watchman - F Dr. Braxton      #CKD3  - Unknown baseline  - Avoid nephrotoxins  - Monitor    #BPH  - Continue Flomax 0.4 mg nightly    #COVID-19 Infection  - PCR positive 7/7, repeat on 7/11 negative  - Monitor     #DVT prophylaxis  - SCDs  - Dopplers on admission 6/13 negative for DVT    d/w rehab team at [facility]

## 2023-07-17 NOTE — PROGRESS NOTE ADULT - SUBJECTIVE AND OBJECTIVE BOX
Medicine Progress Note    Patient is a 83y old  Male who presents with a chief complaint of s/p subdural/subarachnoid hemorrhage (16 Jul 2023 14:21)      SUBJECTIVE / OVERNIGHT EVENTS:  no complaints    ADDITIONAL REVIEW OF SYSTEMS:  denied fever/chills/CP/SOB/cough/palpitation/dizziness/abdominal pian/nausea/vomiting/diarrhoea/constipation/dysuria/leg or calf pain/headaches. all other ROS neg    MEDICATIONS  (STANDING):  aMIOdarone    Tablet 200 milliGRAM(s) Oral daily  dapagliflozin 10 milliGRAM(s) Oral daily  digoxin     Tablet 125 MICROGram(s) Oral daily  levETIRAcetam 500 milliGRAM(s) Oral two times a day  melatonin 9 milliGRAM(s) Oral at bedtime  metoprolol tartrate 12.5 milliGRAM(s) Oral two times a day  neomycin/bacitracin/polymyxin Topical Ointment 1 Application(s) Topical two times a day  senna 2 Tablet(s) Oral at bedtime  tamsulosin 0.4 milliGRAM(s) Oral at bedtime  zinc oxide 40% Paste 1 Application(s) Topical two times a day    MEDICATIONS  (PRN):  acetaminophen     Tablet .. 650 milliGRAM(s) Oral every 6 hours PRN Mild Pain (1 - 3)  polyethylene glycol 3350 17 Gram(s) Oral daily PRN Constipation    CAPILLARY BLOOD GLUCOSE        I&O's Summary    16 Jul 2023 07:01  -  17 Jul 2023 07:00  --------------------------------------------------------  IN: 237 mL / OUT: 2720 mL / NET: -2483 mL    17 Jul 2023 07:01  -  17 Jul 2023 11:59  --------------------------------------------------------  IN: 0 mL / OUT: 300 mL / NET: -300 mL        PHYSICAL EXAM:  Vital Signs Last 24 Hrs  T(C): 36.4 (17 Jul 2023 07:05), Max: 36.6 (16 Jul 2023 21:15)  T(F): 97.6 (17 Jul 2023 07:05), Max: 97.9 (16 Jul 2023 21:15)  HR: 100 (17 Jul 2023 07:05) (96 - 116)  BP: 124/76 (17 Jul 2023 06:21) (95/61 - 124/76)  BP(mean): --  RR: 16 (17 Jul 2023 07:05) (16 - 16)  SpO2: 95% (17 Jul 2023 07:05) (95% - 96%)    Parameters below as of 17 Jul 2023 07:05  Patient On (Oxygen Delivery Method): room air    GENERAL: Not in distress. Alert    HEENT: clear conjuctiva, MMM. no pallor or icterus  CARDIOVASCULAR: RRR S1, S2. No murmur/rubs/gallop  LUNGS: BLAE+, no rales, no wheezing, no rhonchi.    ABDOMEN: ND. Soft,  NT, no guarding / rebound / rigidity. BS normoactive  EXTREMITIES: no edema. no leg or calf TP.  SKIN: warm and dry  PSYCHIATRIC: Calm.  No agitation.  CNS: AAO. moves limbs, follows commands    LABS:                        12.1   7.57  )-----------( 193      ( 17 Jul 2023 06:33 )             37.0     07-17    134<L>  |  99  |  31<H>  ----------------------------<  94  4.6   |  30  |  1.53<H>    Ca    8.7      17 Jul 2023 06:33  Mg     1.9     07-17            Urinalysis Basic - ( 17 Jul 2023 06:33 )    Color: x / Appearance: x / SG: x / pH: x  Gluc: 94 mg/dL / Ketone: x  / Bili: x / Urobili: x   Blood: x / Protein: x / Nitrite: x   Leuk Esterase: x / RBC: x / WBC x   Sq Epi: x / Non Sq Epi: x / Bacteria: x            RADIOLOGY & ADDITIONAL TESTS:  Imaging from Last 24 Hours:    Electrocardiogram/QTc Interval:    COORDINATION OF CARE:  Care Discussed with Consultants/Other Providers:

## 2023-07-17 NOTE — PROGRESS NOTE ADULT - ASSESSMENT
Assessment/Plan:  CHADD VALDIVIA is a 83y with PMH of A fib, HFrEF, HTN, and hx of AVR who presented to the [facility] on 7/6 after fall, found to have SDH/SAH.  Hospital Course complicated by A fib RVR and COVID (+). Patient now admitted for a multidisciplinary rehab program. 07-12-23 @ 15:20    * BP and HR stabilized   * Confirm duration of Keppra form neurosurgeion    * Lab mild hyponatremia, Normal Hb and stable CKD    SAH/SDH  - Continue Keppra 500mg BID for seizure ppx  - Aspiration and fall precautions  - Continue comprehensive rehab program of PT/OT/SLP - 3 hours a day, 5 days a week.     A fib  - Course c/b A fib with RVR  - Will require outpatient cardio f/u, patient good candidate for Watchman implant in future  ( Dr Jonas, cardiologist at [facility])   - Continue amiodarone PO 200mg qd, Lopressor 50mg BID--dose reduced to 12.5mg bid due to hypotension, Farxiga 10mg qd     HFrEF  - EF 30%  - Continue amiodarone 200mg daily  - Caution with excessive fluids    COVID (+)  - PCR positive 7/7, repeat on 7/11 negative    HTN  - Continue lopressor---dose reduced to 12.5mg bid due to hypotension      BPH  - Continue Flomax 0.4mg nightly    Pain  - Tylenol PRN    Sleep  - Melatonin PRN     GI / Bowel  - Senna qHS  - Miralax PRN Daily  - GI ppx: Protonix     / Bladder  - Currently patient voids independently. Check PVRs on admission. Straight cath for > 400ccs    Skin / Pressure injury  - Skin assessment on admission performed: skin tear  x 2 to right forearm, blanchable redness to thoracic spine, moisture associated skin dermatitis to buttock, posterior scalp scab  - Turn q2 hours in bed while awake, air mattress  - nursing to monitor skin q Shift  - soft heel protectors  - skin barrier cream PRN    Diet/Dysphagia:  - Diet Consistency: Regular  - Aspiration Precautions  - SLP consult for swallow function evaluation and treatment  - Nutrition consult    DVT prophylaxis:   - SCD  - f/u Doppler on admission      7/16--* Lab mild hyponatremia, Normal Hb and stable CKD    7/16--L;iaison with family--Ms Tavarez (daughter)   Daughter at bed side--gave collateral hx;  Patient on f/u with Dr Oliver at [facility] hosp service, being considered by ablation treatment or Watchman device for his A fib (alternative treatment due to hx of falls) prior to his fall and hosp admission with SDH  Had been independently functioning at baseline  I gave daughter details of patient's function, clinical progress including management of hypotension by reduction of metoprolol dose  I told her that we will be liaising with patient's cardiologist for post discharge plan      Outpatient Follow-up:  Specialty and Name of physician  Nephrology  Anshu Martino MD  [address]  [facility] [address]  [phone]    [facility]  [address], [address]  [place] [address] [phone]    Electrophysiology  Jason Ortega MD  [address] [place] [id number] [phone]    Eliseo Braxton MD  [address] [facility] [address]  [phone]    Code Status/Emergency Contact:    ---------------

## 2023-07-18 LAB
A1C WITH ESTIMATED AVERAGE GLUCOSE RESULT: 5.7 % — HIGH (ref 4–5.6)
ESTIMATED AVERAGE GLUCOSE: 117 MG/DL — HIGH (ref 68–114)

## 2023-07-18 PROCEDURE — 99232 SBSQ HOSP IP/OBS MODERATE 35: CPT

## 2023-07-18 RX ORDER — METOPROLOL TARTRATE 50 MG
12.5 TABLET ORAL
Refills: 0 | Status: DISCONTINUED | OUTPATIENT
Start: 2023-07-18 | End: 2023-07-19

## 2023-07-18 RX ADMIN — Medication 12.5 MILLIGRAM(S): at 17:03

## 2023-07-18 RX ADMIN — Medication 9 MILLIGRAM(S): at 21:46

## 2023-07-18 RX ADMIN — LEVETIRACETAM 500 MILLIGRAM(S): 250 TABLET, FILM COATED ORAL at 05:47

## 2023-07-18 RX ADMIN — BACITRACIN ZINC NEOMYCIN SULFATE POLYMYXIN B SULFATE 1 APPLICATION(S): 400; 3.5; 5 OINTMENT TOPICAL at 17:06

## 2023-07-18 RX ADMIN — AMIODARONE HYDROCHLORIDE 200 MILLIGRAM(S): 400 TABLET ORAL at 05:47

## 2023-07-18 RX ADMIN — ZINC OXIDE 1 APPLICATION(S): 200 OINTMENT TOPICAL at 17:06

## 2023-07-18 RX ADMIN — TAMSULOSIN HYDROCHLORIDE 0.4 MILLIGRAM(S): 0.4 CAPSULE ORAL at 21:46

## 2023-07-18 RX ADMIN — LEVETIRACETAM 500 MILLIGRAM(S): 250 TABLET, FILM COATED ORAL at 17:03

## 2023-07-18 RX ADMIN — ZINC OXIDE 1 APPLICATION(S): 200 OINTMENT TOPICAL at 05:52

## 2023-07-18 RX ADMIN — BACITRACIN ZINC NEOMYCIN SULFATE POLYMYXIN B SULFATE 1 APPLICATION(S): 400; 3.5; 5 OINTMENT TOPICAL at 05:52

## 2023-07-18 RX ADMIN — Medication 125 MICROGRAM(S): at 05:47

## 2023-07-18 NOTE — PROGRESS NOTE ADULT - SUBJECTIVE AND OBJECTIVE BOX
Medicine Progress Note    Patient is a 83y old  Male who presents with a chief complaint of s/p subdural/subarachnoid hemorrhage (18 Jul 2023 11:00)      SUBJECTIVE / OVERNIGHT EVENTS:    ADDITIONAL REVIEW OF SYSTEMS:    MEDICATIONS  (STANDING):  aMIOdarone    Tablet 200 milliGRAM(s) Oral daily  dapagliflozin 10 milliGRAM(s) Oral daily  digoxin     Tablet 125 MICROGram(s) Oral daily  levETIRAcetam 500 milliGRAM(s) Oral two times a day  melatonin 9 milliGRAM(s) Oral at bedtime  metoprolol succinate ER 12.5 milliGRAM(s) Oral at bedtime  neomycin/bacitracin/polymyxin Topical Ointment 1 Application(s) Topical two times a day  senna 2 Tablet(s) Oral at bedtime  tamsulosin 0.4 milliGRAM(s) Oral at bedtime  zinc oxide 40% Paste 1 Application(s) Topical two times a day    MEDICATIONS  (PRN):  acetaminophen     Tablet .. 650 milliGRAM(s) Oral every 6 hours PRN Mild Pain (1 - 3)  polyethylene glycol 3350 17 Gram(s) Oral daily PRN Constipation    CAPILLARY BLOOD GLUCOSE        I&O's Summary    17 Jul 2023 07:01  -  18 Jul 2023 07:00  --------------------------------------------------------  IN: 0 mL / OUT: 1240 mL / NET: -1240 mL        PHYSICAL EXAM:  Vital Signs Last 24 Hrs  T(C): 36.8 (18 Jul 2023 08:20), Max: 36.9 (17 Jul 2023 20:30)  T(F): 98.3 (18 Jul 2023 08:20), Max: 98.4 (17 Jul 2023 20:30)  HR: 118 (18 Jul 2023 08:20) (108 - 118)  BP: 97/58 (18 Jul 2023 08:20) (97/58 - 109/60)  BP(mean): --  RR: 16 (18 Jul 2023 08:20) (16 - 16)  SpO2: 94% (18 Jul 2023 08:20) (94% - 94%)    Parameters below as of 18 Jul 2023 08:20  Patient On (Oxygen Delivery Method): room air    GENERAL: Not in distress. Alert    HEENT: clear conjuctiva, MMM. no pallor or icterus  CARDIOVASCULAR: RRR S1, S2. No murmur/rubs/gallop  LUNGS: BLAE+, no rales, no wheezing, no rhonchi.    ABDOMEN: ND. Soft,  NT, no guarding / rebound / rigidity. BS normoactive  EXTREMITIES: no edema. no leg or calf TP.  SKIN: warm and dry  PSYCHIATRIC: Calm.  No agitation.  CNS: AAO. moves limbs, follows commands    LABS:                        12.1   7.57  )-----------( 193      ( 17 Jul 2023 06:33 )             37.0     07-17    134<L>  |  99  |  31<H>  ----------------------------<  94  4.6   |  30  |  1.53<H>    Ca    8.7      17 Jul 2023 06:33  Mg     1.9     07-17      Urinalysis Basic - ( 17 Jul 2023 06:33 )    Color: x / Appearance: x / SG: x / pH: x  Gluc: 94 mg/dL / Ketone: x  / Bili: x / Urobili: x   Blood: x / Protein: x / Nitrite: x   Leuk Esterase: x / RBC: x / WBC x   Sq Epi: x / Non Sq Epi: x / Bacteria: x      RADIOLOGY & ADDITIONAL TESTS:  Imaging from Last 24 Hours:    Electrocardiogram/QTc Interval:    COORDINATION OF CARE:  Care Discussed with Consultants/Other Providers:   Medicine Progress Note    Patient is a 83y old  Male who presents with a chief complaint of s/p subdural/subarachnoid hemorrhage (18 Jul 2023 11:00)      SUBJECTIVE / OVERNIGHT EVENTS:  seen on 7/18  no complaints    ADDITIONAL REVIEW OF SYSTEMS:  denied fever/chills/CP/SOB/cough/palpitation/dizziness/abdominal pian/nausea/vomiting/diarrhoea/constipation/dysuria/leg or calf pain/headaches.all other ROS neg    MEDICATIONS  (STANDING):  aMIOdarone    Tablet 200 milliGRAM(s) Oral daily  dapagliflozin 10 milliGRAM(s) Oral daily  digoxin     Tablet 125 MICROGram(s) Oral daily  levETIRAcetam 500 milliGRAM(s) Oral two times a day  melatonin 9 milliGRAM(s) Oral at bedtime  metoprolol succinate ER 12.5 milliGRAM(s) Oral at bedtime  neomycin/bacitracin/polymyxin Topical Ointment 1 Application(s) Topical two times a day  senna 2 Tablet(s) Oral at bedtime  tamsulosin 0.4 milliGRAM(s) Oral at bedtime  zinc oxide 40% Paste 1 Application(s) Topical two times a day    MEDICATIONS  (PRN):  acetaminophen     Tablet .. 650 milliGRAM(s) Oral every 6 hours PRN Mild Pain (1 - 3)  polyethylene glycol 3350 17 Gram(s) Oral daily PRN Constipation    CAPILLARY BLOOD GLUCOSE        I&O's Summary    17 Jul 2023 07:01  -  18 Jul 2023 07:00  --------------------------------------------------------  IN: 0 mL / OUT: 1240 mL / NET: -1240 mL        PHYSICAL EXAM:  Vital Signs Last 24 Hrs  T(C): 36.8 (18 Jul 2023 08:20), Max: 36.9 (17 Jul 2023 20:30)  T(F): 98.3 (18 Jul 2023 08:20), Max: 98.4 (17 Jul 2023 20:30)  HR: 118 (18 Jul 2023 08:20) (108 - 118)  BP: 97/58 (18 Jul 2023 08:20) (97/58 - 109/60)  BP(mean): --  RR: 16 (18 Jul 2023 08:20) (16 - 16)  SpO2: 94% (18 Jul 2023 08:20) (94% - 94%)    Parameters below as of 18 Jul 2023 08:20  Patient On (Oxygen Delivery Method): room air    GENERAL: Not in distress. Alert    HEENT: clear conjuctiva, MMM. no pallor or icterus  CARDIOVASCULAR: RRR S1, S2. No murmur/rubs/gallop  LUNGS: BLAE+, no rales, no wheezing, no rhonchi.    ABDOMEN: ND. Soft,  NT, no guarding / rebound / rigidity. BS normoactive  EXTREMITIES: no edema. no leg or calf TP.  SKIN: warm and dry  PSYCHIATRIC: Calm.  No agitation.  CNS: AAO. moves limbs, follows commands    LABS:                        12.1   7.57  )-----------( 193      ( 17 Jul 2023 06:33 )             37.0     07-17    134<L>  |  99  |  31<H>  ----------------------------<  94  4.6   |  30  |  1.53<H>    Ca    8.7      17 Jul 2023 06:33  Mg     1.9     07-17      Urinalysis Basic - ( 17 Jul 2023 06:33 )    Color: x / Appearance: x / SG: x / pH: x  Gluc: 94 mg/dL / Ketone: x  / Bili: x / Urobili: x   Blood: x / Protein: x / Nitrite: x   Leuk Esterase: x / RBC: x / WBC x   Sq Epi: x / Non Sq Epi: x / Bacteria: x      RADIOLOGY & ADDITIONAL TESTS:  Imaging from Last 24 Hours:    Electrocardiogram/QTc Interval:    COORDINATION OF CARE:  Care Discussed with Consultants/Other Providers:   pain persisting, albeit improving with pain meds  MRI shows hematoma with Iliopsoas tear, s/p Ortho eval, outpt f/u with Ortho  will hold asa for now  c/w pain control  Trend hb

## 2023-07-18 NOTE — PROGRESS NOTE ADULT - SUBJECTIVE AND OBJECTIVE BOX
CC: Traumatic subdural hemorrhage     Today's Subjective & Objective Findings:  Patient seen and examined, at his bed side  Also observed in therapy during PT session  He reports no dizziness, slept well, tolerating oral diet  He requested an early d/c home  He lives alone, reports that his daughter Daysi lives 4 blocks away, brings him meals, and he has good family support  Tolerating oral diet  Reports no acute pain symptoms    Denies headache, dizziness, visual changes, chest pain, SOB/JOLLY, abdominal pain, nausea, vomiting, diarrhea, dysuria, numbness or tingling.  LBM 7/17    Therapy-- engaging, motivated  Observed, ambulating >100ft with hand held assistance, with RW  No dizziness    Vital Signs Last 24 Hrs  T(C): 36.8 (18 Jul 2023 08:20), Max: 36.9 (17 Jul 2023 20:30)  T(F): 98.3 (18 Jul 2023 08:20), Max: 98.4 (17 Jul 2023 20:30)  HR: 118 (18 Jul 2023 08:20) (108 - 118)  BP: 97/58 (18 Jul 2023 08:20) (97/58 - 109/60)  RR: 16 (18 Jul 2023 08:20) (16 - 16)  SpO2: 94% (18 Jul 2023 08:20) (94% - 94%)  O2 Parameters below as of 18 Jul 2023 08:20  Patient On (Oxygen Delivery Method): room air      PHYSICAL EXAM:  Gen -  Comfortable, interactive  HEENT - NAD  Neck - Supple, No limited ROM  Pulm - Clear  Cardiovascular - IRREGULAR , S1S2  Chest - good chest expansion, good respiratory effort  Abdomen - Soft, non tender +BS  Extremities - No Cyanosis, no clubbing, no edema, no calf tenderness      Neuro-  AAO X 3, interactive, cheerful      Cranial Nerves - CN 2-12 intact     Motor - No focal deficits.                     LEFT    UE - 4+/5                    RIGHT UE - 4+/5                    LEFT    LE - 4+/5                    RIGHT LE - 4+/5        Sensory - Intact  to LT      Reflexes - DTR Intact, No primitive reflexive     Coordination -  intact o     Tone - normal  MSK: Kyphosis  Psychiatric - Mood stable, Affect WNL  Skin:  skin tear  x 2 to right forearm, blanchable redness to thoracic spine, moisture associated skin dermatitis to buttock, scab on occipital region, no bleeding  No edema  Marked wt loss     RECENT LABS/IMAGING                        12.1   7.57  )-----------( 193      ( 17 Jul 2023 06:33 )             37.0     07-17    134<L>  |  99  |  31<H>  ----------------------------<  94  4.6   |  30  |  1.53<H>    Ca    8.7      17 Jul 2023 06:33  Mg     1.9     07-17      Urinalysis Basic - ( 17 Jul 2023 06:33 )    Color: x / Appearance: x / SG: x / pH: x  Gluc: 94 mg/dL / Ketone: x  / Bili: x / Urobili: x   Blood: x / Protein: x / Nitrite: x   Leuk Esterase: x / RBC: x / WBC x   Sq Epi: x / Non Sq Epi: x / Bacteria: x      MEDICATIONS  (STANDING):  aMIOdarone    Tablet 200 milliGRAM(s) Oral daily  dapagliflozin 10 milliGRAM(s) Oral daily  digoxin     Tablet 125 MICROGram(s) Oral daily  levETIRAcetam 500 milliGRAM(s) Oral two times a day  melatonin 9 milliGRAM(s) Oral at bedtime  metoprolol succinate ER 12.5 milliGRAM(s) Oral at bedtime  neomycin/bacitracin/polymyxin Topical Ointment 1 Application(s) Topical two times a day  senna 2 Tablet(s) Oral at bedtime  tamsulosin 0.4 milliGRAM(s) Oral at bedtime  zinc oxide 40% Paste 1 Application(s) Topical two times a day    MEDICATIONS  (PRN):  acetaminophen     Tablet .. 650 milliGRAM(s) Oral every 6 hours PRN Mild Pain (1 - 3)  polyethylene glycol 3350 17 Gram(s) Oral daily PRN Constipation

## 2023-07-18 NOTE — PROGRESS NOTE ADULT - ASSESSMENT
84 yo with PMH of A fib, HFrEF, HTN, and hx of AVR presented to [facility] on 7/6 after fall, found to have SDH/SAH. Hospital Course complicated by A fib RVR and COVID (+). Patient now admitted to [facility] for a multidisciplinary rehab program.     #SAH/SDH  - Continue Keppra 500 mg BID for seizure ppx  - Eliquis (for AF) on hold  - Aspiration and fall precautions  - Rehab, pain and bowel regimen per primary team    #HFrEF  # chronic A fib  #HTN  # orthostatic hypotension  - EF 30%  - on amiodarone 200 mg qd, digoxin 125 mcg qd, Lopressor 25 mg BID, Farxiga 10 mg qd  - Metoprolol dose was changed form 50 to 25 mg BID on 7/14 for OH, was changed further to metoprolol ER 12.5 mg BID on 7/15. changed to Metoprolol ER 12,5 mg daily today 7/17 and place don h/s instead of am [ guideline directed therapy]  - held Farxiga for soft BP and to make room to increase metoprolol for tachycardia. Increased metoprolol ER 12.5 mg daily to Metoprolol ER 12.5 mg BID on 7/18  - not a candidate for MRA,ACE/ARBS/ARNI due to OH  - Caution with IVF  - Outpatient cardio follow up for consideration of Watchman - [facility] Dr. Braxton      #CKD3  - Unknown baseline  - Avoid nephrotoxins  - Monitor    #BPH  - Continue Flomax 0.4 mg nightly    #COVID-19 Infection  - PCR positive 7/7, repeat on 7/11 negative  - Monitor     #DVT prophylaxis  - SCDs  - Dopplers on admission 6/13 negative for DVT    d/w rehab team at [facility] 84 yo with PMH of A fib, HFrEF, HTN, and hx of AVR presented to [facility] on 7/6 after fall, found to have SDH/SAH. Hospital Course complicated by A fib RVR and COVID (+). Patient now admitted to [facility] for a multidisciplinary rehab program.     #SAH/SDH  - Continue Keppra 500 mg BID for seizure ppx  - Eliquis (for AF) on hold  - Aspiration and fall precautions  - Rehab, pain and bowel regimen per primary team    #HFrEF  # chronic A fib  #HTN  # orthostatic hypotension  - EF 30%  - on amiodarone 200 mg qd, digoxin 125 mcg qd, Lopressor 25 mg BID, Farxiga 10 mg qd  - Metoprolol dose was changed form 50 to 25 mg BID on 7/14 for OH, was changed further to metoprolol ER 12.5 mg BID on 7/15. changed to Metoprolol ER 12,5 mg daily today 7/17 and place don h/s instead of am [ guideline directed therapy]  - held Farxiga for soft BP and to make room to increase metoprolol for tachycardia. Increased metoprolol ER 12.5 mg daily to Metoprolol ER 12.5 mg BID on 7/18  - not a candidate for MRA,ACE/ARBS/ARNI due to OH  - Caution with IVF  - Outpatient cardio follow up for consideration of Watchman - [facility] Dr. Braxton      #CKD3  - Unknown baseline  - Avoid nephrotoxins  - Monitor    #BPH  - Continue Flomax 0.4 mg nightly    #COVID-19 Infection  - PCR positive 7/7, repeat on 7/11 negative  - Monitor     #DVT prophylaxis  - SCDs  - Dopplers on admission 6/13 negative for DVT    d/w rehab team at [facility]

## 2023-07-18 NOTE — PROGRESS NOTE ADULT - ASSESSMENT
Assessment/Plan:  CHADD VALDIVIA is a 83y with PMH of A fib, HFrEF, HTN, and hx of AVR who presented to the [facility] on 7/6 after fall, found to have SDH/SAH.  Hospital Course complicated by A fib RVR and COVID (+). Patient now admitted for a multidisciplinary rehab program. 07-12-23 @ 15:20    * Vitals stabilize  * Improvement in therapy  * Confirm duration of Keppra form neurosurgeion    SAH/SDH  - Continue Keppra 500mg BID for seizure ppx  - Aspiration and fall precautions  - Continue comprehensive rehab program of PT/OT/SLP - 3 hours a day, 5 days a week.     A fib  - Course c/b A fib with RVR  - Will require outpatient cardio f/u, patient good candidate for Watchman implant in future  ( Dr Jonas, cardiologist [facility])   - Continue amiodarone PO 200mg qd, Lopressor 50mg BID--dose reduced to 12.5mg bid due to hypotension, Farxiga 10mg qd     HFrEF  - EF 30%  - Continue amiodarone 200mg daily  - Caution with excessive fluids    COVID (+)  - PCR positive 7/7, repeat on 7/11 negative    HTN  - Continue lopressor---dose reduced to 12.5mg bid due to hypotension      BPH  - Continue Flomax 0.4mg nightly    Pain  - Tylenol PRN    Sleep  - Melatonin PRN     GI / Bowel  - Senna qHS  - Miralax PRN Daily  - GI ppx: Protonix     / Bladder  - Currently patient voids independently. Check PVRs on admission. Straight cath for > 400ccs    Skin / Pressure injury  - Skin assessment on admission performed: skin tear  x 2 to right forearm, blanchable redness to thoracic spine, moisture associated skin dermatitis to buttock, posterior scalp scab  - Turn q2 hours in bed while awake, air mattress  - nursing to monitor skin q Shift  - soft heel protectors  - skin barrier cream PRN    Diet/Dysphagia:  - Diet Consistency: Regular  - Aspiration Precautions  - on SLP f/u  - Nutrition f/u ongoing    DVT prophylaxis:   - SCD  - f/u Doppler on admission      7/16--* Lab mild hyponatremia, Normal Hb and stable CKD    7/16--L;iaison with family--Ms Tavarez (daughter)   Daughter at bed side--gave collateral hx;  Patient on f/u with Dr Oliver at [facility] hosp service, being considered by ablation treatment or Watchman device for his A fib (alternative treatment due to hx of falls) prior to his fall and hosp admission with SDH  Had been independently functioning at baseline  I gave daughter details of patient's function, clinical progress including management of hypotension by reduction of metoprolol dose  I told her that we will be liaising with patient's cardiologist for post discharge plan      Outpatient Follow-up:  Specialty and Name of physician  Nephrology  Anshu Martino MD  [address]  [facility] [address]  [phone]    [facility]  [address], [address]  [place] [address] [phone]    Electrophysiology  Jason Ortega MD  [address] [place] [address] [phone]    Eliseo Braxton MD  [address] [facility] [address]  [phone]    Code Status/Emergency Contact:    Non critical care  Time based billing   Spent 38 mins, patient review, observation in therapy, discussion of his medical management, dc planning and fall prevention  Assessment/Plan:  CHADD VLADIVIA is a 83y with PMH of A fib, HFrEF, HTN, and hx of AVR who presented to the [facility] on 7/6 after fall, found to have SDH/SAH.  Hospital Course complicated by A fib RVR and COVID (+). Patient now admitted for a multidisciplinary rehab program. 07-12-23 @ 15:20    * Vitals stabilize  * Improvement in therapy  * Confirm duration of Keppra form neurosurgeion    SAH/SDH  - Continue Keppra 500mg BID for seizure ppx  - Aspiration and fall precautions  - Continue comprehensive rehab program of PT/OT/SLP - 3 hours a day, 5 days a week.     A fib  - Course c/b A fib with RVR  - Will require outpatient cardio f/u, patient good candidate for Watchman implant in future  ( Dr Jonas, cardiologist at [facility])   - Continue amiodarone PO 200mg qd, Lopressor 50mg BID--dose reduced to 12.5mg bid due to hypotension, Farxiga 10mg qd     HFrEF  - EF 30%  - Continue amiodarone 200mg daily  - Caution with excessive fluids    COVID (+)  - PCR positive 7/7, repeat on 7/11 negative    HTN  - Continue lopressor---dose reduced to 12.5mg bid due to hypotension      BPH  - Continue Flomax 0.4mg nightly    Pain  - Tylenol PRN    Sleep  - Melatonin PRN     GI / Bowel  - Senna qHS  - Miralax PRN Daily  - GI ppx: Protonix     / Bladder  - Currently patient voids independently. Check PVRs on admission. Straight cath for > 400ccs    Skin / Pressure injury  - Skin assessment on admission performed: skin tear  x 2 to right forearm, blanchable redness to thoracic spine, moisture associated skin dermatitis to buttock, posterior scalp scab  - Turn q2 hours in bed while awake, air mattress  - nursing to monitor skin q Shift  - soft heel protectors  - skin barrier cream PRN    Diet/Dysphagia:  - Diet Consistency: Regular  - Aspiration Precautions  - on SLP f/u  - Nutrition f/u ongoing    DVT prophylaxis:   - SCD  - f/u Doppler on admission    --------------------------------------------  7/16--* Lab mild hyponatremia, Normal Hb and stable CKD    7/16--L;iaison with family--Ms Tavarez (daughter)   Daughter at bed side--gave collateral hx;  Patient on f/u with Dr Oliver at [facility] hosp service, being considered by ablation treatment or Watchman device for his A fib (alternative treatment due to hx of falls) prior to his fall and hosp admission with SDH  Had been independently functioning at baseline  I gave daughter details of patient's function, clinical progress including management of hypotension by reduction of metoprolol dose  I told her that we will be liaising with patient's cardiologist for post discharge plan  --------------------------------------------  IDT conference on 7/19  TDD: 7/26 to home  Barriers: Forgetful.  Social Work: Lives alone in  with 5-6 ISABEL with 1 HR and 14 STI. Has chair lift. Gets HHA 2-3 hrs 2x per week. Daughter lives close by.  OT: Setup for eating/grooming. CGA for all remaining ADLs/transfers.   PT: CGA for transfers. Ambulated 100 ft with RW with CGA. Negotiated 8 stairs with 2 HR with min A.   SLP: Regular with TLs. Mild cognitive deficits in short term memory. Needs cueing.  Will need family training.   --------------------------------------------  Outpatient Follow-up:  Specialty and Name of physician  Nephrology  Anshu Martino MD  [address]  [facility] [id number] [phone]    [facility]  [address], [address]  [place] [address] [phone]    Electrophysiology  Jason Ortega MD  [address] [place] [address] [phone]    Eliseo Braxton MD  [address] [facility] [address] [phone]    Code Status/Emergency Contact:    Non critical care  Time based billing   Spent 38 mins, patient review, observation in therapy, discussion of his medical management, dc planning and fall prevention

## 2023-07-19 ENCOUNTER — TRANSCRIPTION ENCOUNTER (OUTPATIENT)
Age: 83
End: 2023-07-19

## 2023-07-19 PROCEDURE — 99232 SBSQ HOSP IP/OBS MODERATE 35: CPT

## 2023-07-19 RX ORDER — METOPROLOL TARTRATE 50 MG
12.5 TABLET ORAL AT BEDTIME
Refills: 0 | Status: DISCONTINUED | OUTPATIENT
Start: 2023-07-19 | End: 2023-07-21

## 2023-07-19 RX ADMIN — ZINC OXIDE 1 APPLICATION(S): 200 OINTMENT TOPICAL at 05:40

## 2023-07-19 RX ADMIN — BACITRACIN ZINC NEOMYCIN SULFATE POLYMYXIN B SULFATE 1 APPLICATION(S): 400; 3.5; 5 OINTMENT TOPICAL at 17:51

## 2023-07-19 RX ADMIN — LEVETIRACETAM 500 MILLIGRAM(S): 250 TABLET, FILM COATED ORAL at 17:51

## 2023-07-19 RX ADMIN — ZINC OXIDE 1 APPLICATION(S): 200 OINTMENT TOPICAL at 17:54

## 2023-07-19 RX ADMIN — LEVETIRACETAM 500 MILLIGRAM(S): 250 TABLET, FILM COATED ORAL at 05:38

## 2023-07-19 RX ADMIN — AMIODARONE HYDROCHLORIDE 200 MILLIGRAM(S): 400 TABLET ORAL at 05:38

## 2023-07-19 RX ADMIN — TAMSULOSIN HYDROCHLORIDE 0.4 MILLIGRAM(S): 0.4 CAPSULE ORAL at 21:15

## 2023-07-19 RX ADMIN — Medication 125 MICROGRAM(S): at 05:39

## 2023-07-19 RX ADMIN — Medication 12.5 MILLIGRAM(S): at 05:38

## 2023-07-19 RX ADMIN — Medication 9 MILLIGRAM(S): at 21:16

## 2023-07-19 RX ADMIN — BACITRACIN ZINC NEOMYCIN SULFATE POLYMYXIN B SULFATE 1 APPLICATION(S): 400; 3.5; 5 OINTMENT TOPICAL at 05:39

## 2023-07-19 RX ADMIN — Medication 12.5 MILLIGRAM(S): at 21:15

## 2023-07-19 NOTE — DISCHARGE NOTE PROVIDER - CARE PROVIDER_API CALL
Gila German  Physical/Rehab Medicine  [address] [place] [address]  Phone: [phone]  Fax: [phone]  Follow Up Time: 1 month   Gila German  Physical/Rehab Medicine  [address] [place] [address]  Phone: [phone]  Fax: [phone]  Follow Up Time: 1 month    Gunner Brasher  Nephrology  [address]  [place] [address]  Phone: [phone]  Fax: [phone]  Follow Up Time: 2 weeks    SUNSHINE MOLINA  [address] [place] [address]  Phone: ()-  Fax: ()-  Follow Up Time: 2 weeks    Osvaldo Valladares  Neurology  [address]  [place] [address]  Phone: [phone]  Fax: [phone]  Follow Up Time: 1 week    GUSTAVO WILBURN  [address] [place] [address]  Phone: ()-  Fax: ()-  Follow Up Time: 1 week   Gila German  Physical/Rehab Medicine  [address] [place] [address]  Phone: [phone]  Fax: [phone]  Follow Up Time: 1 month    Gunner Brasher  Nephrology  [address], [address]  [place] [address]  Phone: [phone]  Fax: [phone]  Follow Up Time: 2 weeks    SUNSHINE MOLINA  [address] [place] [address]  Phone: ()-  Fax: ()-  Follow Up Time: 2 weeks    Osvaldo Valladares  Neurology  [address]  [place] [address]  Phone: [phone]  Fax: [phone]  Follow Up Time: 1 week    GUSTAVO WILBURN  [address] [place] [address]  Phone: ()-  Fax: ()-  Follow Up Time: 1 week    Mariano Kaplan  Urology  [address] [place] [address]  Phone: [phone]  Fax: [phone]  Follow Up Time: 1 week

## 2023-07-19 NOTE — PROGRESS NOTE ADULT - SUBJECTIVE AND OBJECTIVE BOX
Medicine Progress Note    Patient is a 83y old  Male who presents with a chief complaint of s/p subdural/subarachnoid hemorrhage (19 Jul 2023 10:48)      SUBJECTIVE / OVERNIGHT EVENTS:  no complaints except orthostatic dizziness. SBP dropped to 80s.     ADDITIONAL REVIEW OF SYSTEMS:  denied fever/chills/CP/SOB/cough/palpitation/dizziness/abdominal pian/nausea/vomiting/diarrhoea/constipation/dysuria/leg or calf pain/headaches.all other ROS neg    MEDICATIONS  (STANDING):  aMIOdarone    Tablet 200 milliGRAM(s) Oral daily  digoxin     Tablet 125 MICROGram(s) Oral daily  levETIRAcetam 500 milliGRAM(s) Oral two times a day  melatonin 9 milliGRAM(s) Oral at bedtime  metoprolol succinate ER 12.5 milliGRAM(s) Oral at bedtime  neomycin/bacitracin/polymyxin Topical Ointment 1 Application(s) Topical two times a day  senna 2 Tablet(s) Oral at bedtime  tamsulosin 0.4 milliGRAM(s) Oral at bedtime  zinc oxide 40% Paste 1 Application(s) Topical two times a day    MEDICATIONS  (PRN):  acetaminophen     Tablet .. 650 milliGRAM(s) Oral every 6 hours PRN Mild Pain (1 - 3)  polyethylene glycol 3350 17 Gram(s) Oral daily PRN Constipation    CAPILLARY BLOOD GLUCOSE        I&O's Summary    18 Jul 2023 07:01  -  19 Jul 2023 07:00  --------------------------------------------------------  IN: 0 mL / OUT: 3300 mL / NET: -3300 mL        PHYSICAL EXAM:  Vital Signs Last 24 Hrs  T(C): 36.6 (19 Jul 2023 08:47), Max: 36.8 (18 Jul 2023 19:50)  T(F): 97.9 (19 Jul 2023 08:47), Max: 98.3 (18 Jul 2023 19:50)  HR: 79 (19 Jul 2023 08:47) (79 - 118)  BP: 99/64 (19 Jul 2023 08:47) (99/64 - 124/80)  BP(mean): --  RR: 16 (19 Jul 2023 08:47) (16 - 16)  SpO2: 97% (19 Jul 2023 08:47) (95% - 97%)    Parameters below as of 19 Jul 2023 08:47  Patient On (Oxygen Delivery Method): room air    GENERAL: Not in distress. Alert    HEENT: clear conjuctiva, MMM. no pallor or icterus  CARDIOVASCULAR: RRR S1, S2. No murmur/rubs/gallop  LUNGS: BLAE+, no rales, no wheezing, no rhonchi.    ABDOMEN: ND. Soft,  NT, no guarding / rebound / rigidity. BS normoactive  EXTREMITIES: no edema. no leg or calf TP.  SKIN: warm and dry  PSYCHIATRIC: Calm.  No agitation.  CNS: AAO. moves limbs, follows commands      LABS:                        RADIOLOGY & ADDITIONAL TESTS:  Imaging from Last 24 Hours:    Electrocardiogram/QTc Interval:    COORDINATION OF CARE:  Care Discussed with Consultants/Other Providers:

## 2023-07-19 NOTE — PROGRESS NOTE ADULT - SUBJECTIVE AND OBJECTIVE BOX
CC: Traumatic subdural hemorrhage     Today's Subjective & Objective Findings  Patient seen and examined, at his bed side with MEGHAN Cosby  Patient reports slight improvement of sleeping pattern, reports sleeping 2-3hrs each time, waking up for few mins and sleeping again afterwards  To him this is much better that complete insomnia, which he was happy before commencing ambien   His fall episode occurred while on ambien, this medication is now on hold    He reports no dizziness, despite low systolic BP readings  He is tolerating oral diet   slept well, tolerating oral diet    ROS  Denies headache, dizziness, visual changes, chest pain, SOB/JOLLY, abdominal pain, nausea, vomiting, diarrhea, dysuria, numbness or tingling  No more dizziness.  [facility] 7/17    Therapy-- He remains motivated and engaging  Observe today, ambulating increasing distance with Rolling walker  But requiring reminders during turns    We discussed details from IDT meeting yesterday, regarding his function, plan to consolidate on progressive ambulation, work in endurance/gait training and falls prevention  He was happy with the dc date as discussed  He had been living alone prior to admission, hence would need intensive falls prevention training    Vital Signs Last 24 Hrs  T(C): 36.6 (19 Jul 2023 08:47), Max: 36.8 (18 Jul 2023 19:50)  T(F): 97.9 (19 Jul 2023 08:47), Max: 98.3 (18 Jul 2023 19:50)  HR: 79 (19 Jul 2023 08:47) (79 - 118)  BP: 99/64 (19 Jul 2023 08:47) (99/64 - 124/80)  RR: 16 (19 Jul 2023 08:47) (16 - 16)  SpO2: 97% (19 Jul 2023 08:47) (95% - 97%)  O2 Parameters below as of 19 Jul 2023 08:47  Patient On (Oxygen Delivery Method): room air    PHYSICAL EXAM:  Gen -  Comfortable,   HEENT - NAD  Neck - Supple,   Pulm - Clear  Cardiovascular - irregular , S1S2  Chest - vesicular breath sounds  Abdomen - Soft, non tender +BS  Extremities - No Cyanosis, no clubbing, no edema, no calf tenderness    Neuro-  AAO X 3,      Cranial Nerves - CN 2-12 intact     Motor - No focal deficits. 4+/5     Sensory - Intact  to LT      Reflexes -2+     Coordination -  no dysmetria     Tone - normal  MSK: Kyphosis  Psychiatric - Mood stable, Affect WNL  Skin:  skin tear  x 2 to right forearm, blanchable redness to thoracic spine, moisture associated skin dermatitis to buttock, scab on occipital region, no bleeding  No edema  Marked wt loss     RECENT LABS/IMAGING                        12.1   7.57  )-----------( 193      ( 17 Jul 2023 06:33 )             37.0     07-17    134<L>  |  99  |  31<H>  ----------------------------<  94  4.6   |  30  |  1.53<H>    Ca    8.7      17 Jul 2023 06:33  Mg     1.9     07-17      Urinalysis Basic - ( 17 Jul 2023 06:33 )    Color: x / Appearance: x / SG: x / pH: x  Gluc: 94 mg/dL / Ketone: x  / Bili: x / Urobili: x   Blood: x / Protein: x / Nitrite: x   Leuk Esterase: x / RBC: x / WBC x   Sq Epi: x / Non Sq Epi: x / Bacteria: x      MEDICATIONS  (STANDING):  aMIOdarone    Tablet 200 milliGRAM(s) Oral daily  dapagliflozin 10 milliGRAM(s) Oral daily  digoxin     Tablet 125 MICROGram(s) Oral daily  levETIRAcetam 500 milliGRAM(s) Oral two times a day  melatonin 9 milliGRAM(s) Oral at bedtime  metoprolol succinate ER 12.5 milliGRAM(s) Oral at bedtime  neomycin/bacitracin/polymyxin Topical Ointment 1 Application(s) Topical two times a day  senna 2 Tablet(s) Oral at bedtime  tamsulosin 0.4 milliGRAM(s) Oral at bedtime  zinc oxide 40% Paste 1 Application(s) Topical two times a day    MEDICATIONS  (PRN):  acetaminophen     Tablet .. 650 milliGRAM(s) Oral every 6 hours PRN Mild Pain (1 - 3)  polyethylene glycol 3350 17 Gram(s) Oral daily PRN Constipation     CC: Traumatic subdural hemorrhage     Today's Subjective & Objective Findings  Patient seen and examined, at his bed side with MEGHAN Cosby  Patient reports slight improvement of sleeping pattern, reports sleeping 2-3hrs each time, waking up for few mins and sleeping again afterwards  To him this is much better that complete insomnia, which he was happy before commencing ambien   His fall episode occurred while on ambien, this medication is now on hold    He reports no dizziness, despite low systolic BP readings  He is tolerating oral diet   slept well, tolerating oral diet    ROS  Denies headache, dizziness, visual changes, chest pain, SOB/JOLLY, abdominal pain, nausea, vomiting, diarrhea, dysuria, numbness or tingling  No more dizziness.  [facility] 7/17    Therapy-- He remains motivated and engaging  Observe today, ambulating increasing distance with Rolling walker  But requiring reminders during turns    We discussed details from IDT meeting yesterday, regarding his function, plan to consolidate on progressive ambulation, work in endurance/gait training and falls prevention  He was happy with the dc date as discussed  He had been living alone prior to admission, hence would need intensive falls prevention training    Vital Signs Last 24 Hrs  T(C): 36.6 (19 Jul 2023 08:47), Max: 36.8 (18 Jul 2023 19:50)  T(F): 97.9 (19 Jul 2023 08:47), Max: 98.3 (18 Jul 2023 19:50)  HR: 79 (19 Jul 2023 08:47) (79 - 118)  BP: 99/64 (19 Jul 2023 08:47) (99/64 - 124/80)  RR: 16 (19 Jul 2023 08:47) (16 - 16)  SpO2: 97% (19 Jul 2023 08:47) (95% - 97%)  O2 Parameters below as of 19 Jul 2023 08:47  Patient On (Oxygen Delivery Method): room air    PHYSICAL EXAM:  Gen -  Comfortable,   HEENT - NAD  Neck - Supple,   Pulm - Clear  Cardiovascular - irregular , S1S2  Chest - vesicular breath sounds  Abdomen - Soft, non tender +BS  Extremities - No Cyanosis, no clubbing, no edema, no calf tenderness    Neuro-  AAO X 3,      Cranial Nerves - CN 2-12 intact     Motor - No focal deficits. 4+/5     Sensory - Intact  to LT      Reflexes -2+     Coordination -  no dysmetria     Tone - normal  MSK: Kyphosis  Psychiatric - Mood stable, Affect WNL  Skin:  skin tear  x 2 to right forearm, blanchable redness to thoracic spine, moisture associated skin dermatitis to buttock,resolving  No edema    RECENT LABS/IMAGING                        12.1   7.57  )-----------( 193      ( 17 Jul 2023 06:33 )             37.0     07-17    134<L>  |  99  |  31<H>  ----------------------------<  94  4.6   |  30  |  1.53<H>    Ca    8.7      17 Jul 2023 06:33  Mg     1.9     07-17      Urinalysis Basic - ( 17 Jul 2023 06:33 )    Color: x / Appearance: x / SG: x / pH: x  Gluc: 94 mg/dL / Ketone: x  / Bili: x / Urobili: x   Blood: x / Protein: x / Nitrite: x   Leuk Esterase: x / RBC: x / WBC x   Sq Epi: x / Non Sq Epi: x / Bacteria: x      MEDICATIONS  (STANDING):  aMIOdarone    Tablet 200 milliGRAM(s) Oral daily  digoxin     Tablet 125 MICROGram(s) Oral daily  levETIRAcetam 500 milliGRAM(s) Oral two times a day  melatonin 9 milliGRAM(s) Oral at bedtime  metoprolol succinate ER 12.5 milliGRAM(s) Oral at bedtime  neomycin/bacitracin/polymyxin Topical Ointment 1 Application(s) Topical two times a day  senna 2 Tablet(s) Oral at bedtime  tamsulosin 0.4 milliGRAM(s) Oral at bedtime  zinc oxide 40% Paste 1 Application(s) Topical two times a day    MEDICATIONS  (PRN):  acetaminophen     Tablet .. 650 milliGRAM(s) Oral every 6 hours PRN Mild Pain (1 - 3)  polyethylene glycol 3350 17 Gram(s) Oral daily PRN Constipation   CC: Traumatic subdural hemorrhage     Today's Subjective & Objective Findings  Patient seen and examined, at his bed side with MEGHAN Cosby  Patient reports slight improvement of sleeping pattern, reports sleeping 2-3hrs each time, waking up for few mins and sleeping again afterwards  To him this is much better that complete insomnia, which he was happy before commencing ambien   His fall episode occurred while on ambien, this medication is now on hold    He reports no dizziness, despite low systolic BP readings  He is tolerating oral diet   slept well, tolerating oral diet    ROS  Denies headache, dizziness, visual changes, chest pain, SOB/JOLLY, abdominal pain, nausea, vomiting, diarrhea, dysuria, numbness or tingling  No more dizziness.  [facility] 7/17    Therapy-- He remains motivated and engaging  Observe today, ambulating increasing distance with Rolling walker  But requiring reminders during turns    We discussed details from IDT meeting yesterday, regarding his function, plan to consolidate on progressive ambulation, work in endurance/gait training and falls prevention  He was happy with the dc date as discussed  He had been living alone prior to admission, hence would need intensive falls prevention training    Vital Signs Last 24 Hrs  T(C): 36.6 (19 Jul 2023 08:47), Max: 36.8 (18 Jul 2023 19:50)  T(F): 97.9 (19 Jul 2023 08:47), Max: 98.3 (18 Jul 2023 19:50)  HR: 79 (19 Jul 2023 08:47) (79 - 118)  BP: 99/64 (19 Jul 2023 08:47) (99/64 - 124/80)  RR: 16 (19 Jul 2023 08:47) (16 - 16)  SpO2: 97% (19 Jul 2023 08:47) (95% - 97%)  O2 Parameters below as of 19 Jul 2023 08:47  Patient On (Oxygen Delivery Method): room air    PHYSICAL EXAM:  Gen -  Comfortable,   HEENT - NAD  Neck - Supple,   Pulm - Clear  Cardiovascular - irregular , S1S2  Chest - vesicular breath sounds  Abdomen - Soft, non tender +BS  Extremities - No Cyanosis, no clubbing, no edema, no calf tenderness    Neuro-  AAO X 3,      Cranial Nerves - CN 2-12 intact     Motor - No focal deficits. 4+/5     Sensory - Intact  to LT      Reflexes -2+     Coordination -  no dysmetria     Tone - normal  MSK: Kyphosis  Psychiatric - Mood stable, Affect WNL  Skin:  skin tear  x 2 to right forearm, blanchable redness to thoracic spine, moisture associated skin dermatitis to buttock,resolving  No edema    RECENT LABS/IMAGING                        12.1   7.57  )-----------( 193      ( 17 Jul 2023 06:33 )             37.0     07-17    134<L>  |  99  |  31<H>  ----------------------------<  94  4.6   |  30  |  1.53<H>    Ca    8.7      17 Jul 2023 06:33  Mg     1.9     07-17      Urinalysis Basic - ( 17 Jul 2023 06:33 )    Color: x / Appearance: x / SG: x / pH: x  Gluc: 94 mg/dL / Ketone: x  / Bili: x / Urobili: x   Blood: x / Protein: x / Nitrite: x   Leuk Esterase: x / RBC: x / WBC x   Sq Epi: x / Non Sq Epi: x / Bacteria: x      MEDICATIONS  (STANDING):  aMIOdarone    Tablet 200 milliGRAM(s) Oral daily  digoxin     Tablet 125 MICROGram(s) Oral daily  levETIRAcetam 500 milliGRAM(s) Oral two times a day  melatonin 9 milliGRAM(s) Oral at bedtime  metoprolol succinate ER 12.5 milliGRAM(s) Oral at bedtime  neomycin/bacitracin/polymyxin Topical Ointment 1 Application(s) Topical two times a day  senna 2 Tablet(s) Oral at bedtime  tamsulosin 0.4 milliGRAM(s) Oral at bedtime  zinc oxide 40% Paste 1 Application(s) Topical two times a day    MEDICATIONS  (PRN):  acetaminophen     Tablet .. 650 milliGRAM(s) Oral every 6 hours PRN Mild Pain (1 - 3)  polyethylene glycol 3350 17 Gram(s) Oral daily PRN Constipation    INTERPRETATION:  CT HEAD    CLINICAL HISTORY: f/up ICH    TECHNIQUE:  Noncontrast CT.  Axial Acquisition.  Sagittal and coronal reformations.    COMPARISON:  Prior outside studies unavailable for comparison at time of dictation    FINDINGS:  Mild hemorrhage and edema within the inferior aspect of the left frontal   lobe likely related to subacute trauma. Trace intraventricular hemorrhage   within the dependent portion of the left lateral ventricle. Trace   subdural hemorrhage along the left side of the posterior falx measuring 2   to 3 mm.    Mild atrophy. Mild periventricular white matter ischemic changes. Chronic   small bilateral cerebellar infarcts.    No hydrocephalus. No midline shift.    Linear nondisplaced right occipital calvarial fracture.    Visualized paranasal sinuses and mastoid air cells are well aerated.    IMPRESSION:  Posttraumatic cranial and intracranial injuries as described. No midline   shift or hydrocephalus. Interval follow-up recommended.

## 2023-07-19 NOTE — DISCHARGE NOTE PROVIDER - NSDCMRMEDTOKEN_GEN_ALL_CORE_FT
acetaminophen-oxyCODONE 325 mg-5 mg oral tablet: 1 tab(s) orally 3 times a day, As Needed - for moderate pain  amLODIPine:  orally   aspirin 325 mg oral tablet: 1 tab(s) orally once a day  Colace 100 mg oral capsule: 1 cap(s) orally 2 times a day, As Needed - for constipation  Naprosyn:  orally    acetaminophen 325 mg oral tablet: 2 tab(s) orally every 6 hours As needed Mild Pain (1 - 3)  amiodarone 200 mg oral tablet: 1 tab(s) orally once a day  ammonium lactate 12% topical lotion: 1 Apply topically to affected area 2 times a day  apixaban 5 mg oral tablet: 1 tab(s) orally 2 times a day  cefuroxime 500 mg oral tablet: 1 tab(s) orally every 12 hours  melatonin 3 mg oral tablet: 3 tab(s) orally once a day (at bedtime)  midodrine 2.5 mg oral tablet: 3 tab(s) orally 3 times a day at 6am, 12pm, and 5pm  mirtazapine 7.5 mg oral tablet: 1 tab(s) orally once a day (at bedtime)  polyethylene glycol 3350 oral powder for reconstitution: 17 gram(s) orally once a day As needed Constipation  senna leaf extract oral tablet: 2 tab(s) orally once a day (at bedtime)  zinc oxide 40% topical ointment: Apply topically to affected area once a day   acetaminophen 325 mg oral tablet: 2 tab(s) orally every 6 hours As needed Mild Pain (1 - 3)  amiodarone 200 mg oral tablet: 1 tab(s) orally once a day  ammonium lactate 12% topical lotion: 1 Apply topically to affected area 2 times a day  apixaban 5 mg oral tablet: 1 tab(s) orally 2 times a day  digoxin 125 mcg (0.125 mg) oral tablet: 1 tab(s) orally every other day  melatonin 3 mg oral tablet: 3 tab(s) orally once a day (at bedtime)  metoprolol succinate 25 mg oral tablet, extended release: 0.5 tab(s) orally once a day (at bedtime)  midodrine 5 mg oral tablet: 3 tab(s) orally 3 times a day at 6am, 12pm, and 5pm  mirtazapine 7.5 mg oral tablet: 1 tab(s) orally once a day (at bedtime)  polyethylene glycol 3350 oral powder for reconstitution: 17 gram(s) orally once a day As needed Constipation  senna leaf extract oral tablet: 2 tab(s) orally once a day (at bedtime)  zinc oxide 40% topical ointment: Apply topically to affected area once a day   acetaminophen 325 mg oral tablet: 2 tab(s) orally every 6 hours As needed Mild Pain (1 - 3)  amiodarone 200 mg oral tablet: 1 tab(s) orally once a day  ammonium lactate 12% topical lotion: 1 Apply topically to affected area 2 times a day  apixaban 5 mg oral tablet: 1 tab(s) orally 2 times a day  digoxin 125 mcg (0.125 mg) oral tablet: 1 tab(s) orally once a day  digoxin 125 mcg (0.125 mg) oral tablet: 1 tab(s) orally once a day  fludrocortisone 0.1 mg oral tablet: 1 tab(s) orally once a day  melatonin 3 mg oral tablet: 3 tab(s) orally once a day (at bedtime)  metoprolol succinate 25 mg oral tablet, extended release: 0.5 tab(s) orally once a day (at bedtime)  midodrine 5 mg oral tablet: 3 tab(s) orally 3 times a day at 6am, 12pm, and 5pm  mirtazapine 7.5 mg oral tablet: 1 tab(s) orally once a day (at bedtime)  polyethylene glycol 3350 oral powder for reconstitution: 17 gram(s) orally once a day As needed Constipation  senna leaf extract oral tablet: 2 tab(s) orally once a day (at bedtime)  tamsulosin 0.4 mg oral capsule: 1 cap(s) orally once a day (at bedtime)  zinc oxide 40% topical ointment: Apply topically to affected area once a day

## 2023-07-19 NOTE — DISCHARGE NOTE PROVIDER - NPI NUMBER (FOR SYSADMIN USE ONLY) :
[[id number]] [[id number]] [[id number]]

## 2023-07-19 NOTE — DISCHARGE NOTE PROVIDER - EXTENDED VTE YES NO FOR MLM ENOXAPARIN
Note Text (......Xxx Chief Complaint.): This diagnosis correlates with the Other (Free Text): Right mid upper back - s/p excision by CE 10/15/15 \\nWS NER Detail Level: Detailed ,

## 2023-07-19 NOTE — DISCHARGE NOTE PROVIDER - DETAILS OF MALNUTRITION DIAGNOSIS/DIAGNOSES
This patient has been assessed with a concern for Malnutrition and was treated during this hospitalization for the following Nutrition diagnosis/diagnoses:     -  07/13/2023: Moderate protein-calorie malnutrition   This patient has been assessed with a concern for Malnutrition and was treated during this hospitalization for the following Nutrition diagnosis/diagnoses:     -  08/04/2023: Severe protein-calorie malnutrition   -  07/13/2023: Moderate protein-calorie malnutrition

## 2023-07-19 NOTE — DISCHARGE NOTE PROVIDER - PROVIDER TOKENS
PROVIDER:[TOKEN:[[id number]],FOLLOWUP:[1 month]] PROVIDER:[TOKEN:[[id number]],FOLLOWUP:[1 month]],PROVIDER:[TOKEN:[[id number]],FOLLOWUP:[2 weeks]],PROVIDER:[TOKEN:[[id number]],FOLLOWUP:[2 weeks]],PROVIDER:[TOKEN:[[id number]],FOLLOWUP:[1 week]],PROVIDER:[TOKEN:[[id number]],FOLLOWUP:[1 week]] PROVIDER:[TOKEN:[[id number]],FOLLOWUP:[1 month]],PROVIDER:[TOKEN:[[id number]],FOLLOWUP:[2 weeks]],PROVIDER:[TOKEN:[[id number]],FOLLOWUP:[2 weeks]],PROVIDER:[TOKEN:[[id number]],FOLLOWUP:[1 week]],PROVIDER:[TOKEN:[[id number]],FOLLOWUP:[1 week]],PROVIDER:[TOKEN:[[id number]],FOLLOWUP:[1 week]]

## 2023-07-19 NOTE — PROGRESS NOTE ADULT - ASSESSMENT
82 yo with PMH of A fib, HFrEF, HTN, and hx of AVR presented to [facility] on 7/6 after fall, found to have SDH/SAH. Hospital Course complicated by A fib RVR and COVID (+). Patient now admitted to [facility] for a multidisciplinary rehab program.     #SAH/SDH  - Continue Keppra 500 mg BID for seizure ppx. duration per neuro  - Eliquis (for AF) on hold  - Aspiration and fall precautions  - Rehab, pain and bowel regimen per primary team    #HFrEF  # chronic A fib  #HTN  # orthostatic hypotension  - EF 30%  - on amiodarone 200 mg qd, digoxin 125 mcg qd, Lopressor 25 mg BID, Farxiga 10 mg qd  - Metoprolol dose was changed from 50 to 25 mg BID on 7/14 for OH, was changed further to metoprolol ER 12.5 mg BID on 7/15. changed to Metoprolol ER 12,5 mg daily and place on h/s instead of am [ guideline directed therapy]. hold Metoprolol if further hypotension  - held Farxiga for soft BP   - not a candidate for MRA,ACE/ARBS/ARNI due to OH  - Caution with IVF  - Outpatient cardio follow up for consideration of [facility] - [facility] Dr. Braxton      #CKD3  - Unknown baseline  - Avoid nephrotoxins  - Monitor    #BPH  - Continue Flomax 0.4 mg nightly    #COVID-19 Infection  - PCR positive 7/7, repeat on 7/11 negative  - Monitor     #DVT prophylaxis  - SCDs  - Dopplers on admission 6/13 negative for DVT    d/w rehab team at [facility]

## 2023-07-19 NOTE — DISCHARGE NOTE PROVIDER - NSDCCPCAREPLAN_GEN_ALL_CORE_FT
PRINCIPAL DISCHARGE DIAGNOSIS  Diagnosis: Nontraumatic intracranial subdural hematoma  Assessment and Plan of Treatment: CTH 7/13 showed Trace subdural hemorrhage along the left side of the posterior falx measuring 2 to 3 mm.  - Keppra completed on 7/23 per [place]         SECONDARY DISCHARGE DIAGNOSES  Diagnosis: Atrial fibrillation  Assessment and Plan of Treatment: - Echocardiogram EF 55-60% 7/25  - Continue Amiodarone 400mg daily   -Continue Eliquis 5mg BID 7/27 - head CT stable 7/29       Diagnosis: Urinary tract infection  Assessment and Plan of Treatment: Continue Ceftin 500mg BID for 5 days - 1 more day to continue on discharge   -Follow up [facility] urology for furhter management and possible restarting flomax    Diagnosis: Constipation  Assessment and Plan of Treatment: Continue Senna and Miralax PRN Daily  - GI prophylaxis - Protonix while on eliquis    Diagnosis: Orthostatic hypotension  Assessment and Plan of Treatment: Continue midodrine 7.5mg TID - follow up with your cardiologist for further monitoring and management    Diagnosis: Depression, reactive  Assessment and Plan of Treatment: - Continue Melatonin  9 mg at bedtime   -Continue Mirtazapine 7.5mg at bedtime     PRINCIPAL DISCHARGE DIAGNOSIS  Diagnosis: Nontraumatic intracranial subdural hematoma  Assessment and Plan of Treatment: CTH 7/13 showed Trace subdural hemorrhage along the left side of the posterior falx measuring 2 to 3 mm.  - Keppra completed on 7/23 per [place]         SECONDARY DISCHARGE DIAGNOSES  Diagnosis: Atrial fibrillation  Assessment and Plan of Treatment: - Echocardiogram EF 55-60% 7/25  - Continue Amiodarone 400mg daily   -Continue Eliquis 5mg BID 7/27 - head CT stable 7/29   -Restarted on metoprolol 12.5mg ER due to tachycardia  -Restarted digoxin 125mcg every other day  -Monitor for orthostasis and follow up with cardiology       Diagnosis: Urinary tract infection  Assessment and Plan of Treatment: Ceftin completed but no with retention at times  -Follow up [facility] urology for further management and possible restarting flomax    Diagnosis: Constipation  Assessment and Plan of Treatment: Continue Senna and Miralax PRN Daily  - GI prophylaxis - Protonix while on eliquis    Diagnosis: Orthostatic hypotension  Assessment and Plan of Treatment: Continue midodrine 15mg TID - follow up with your cardiologist for further monitoring and management    Diagnosis: Depression, reactive  Assessment and Plan of Treatment: - Continue Melatonin  9 mg at bedtime   -Continue Mirtazapine 7.5mg at bedtime     PRINCIPAL DISCHARGE DIAGNOSIS  Diagnosis: Nontraumatic intracranial subdural hematoma  Assessment and Plan of Treatment: CTH 7/13 showed Trace subdural hemorrhage along the left side of the posterior falx measuring 2 to 3 mm.  - Keppra completed on 7/23 per [facility]         SECONDARY DISCHARGE DIAGNOSES  Diagnosis: Atrial fibrillation  Assessment and Plan of Treatment: - Echocardiogram EF 55-60% 7/25  - Continue Amiodarone 400mg daily   -Continue Eliquis 5mg BID 7/27 - head CT stable 7/29   -Restarted on metoprolol 12.5mg ER due to tachycardia  -Restarted digoxin 125mcg every day  -Monitor for orthostasis and follow up with cardiology       Diagnosis: Urinary tract infection  Assessment and Plan of Treatment: Ceftin completed but no with retention at times  -Continue flomax 0.4mg at bedtime  -Follow up [facility] urology for further management and possible restarting flomax    Diagnosis: Constipation  Assessment and Plan of Treatment: Continue Senna and Miralax PRN Daily  - GI prophylaxis - Protonix while on eliquis    Diagnosis: Orthostatic hypotension  Assessment and Plan of Treatment: Continue midodrine 15mg TID  Continue florinef 0.1mg daily   - follow up with your cardiologist for further monitoring and management    Diagnosis: Depression, reactive  Assessment and Plan of Treatment: - Continue Melatonin  9 mg at bedtime   -Continue Mirtazapine 7.5mg at bedtime

## 2023-07-19 NOTE — DISCHARGE NOTE PROVIDER - NSDCFUADDAPPT_GEN_ALL_CORE_FT
Please follow up with the following physicians:    Specialty and Name of physician  Nephrology  Anshu Martino MD  [address]  [facility] [id number] [phone]    Michael  [address], [address]  [place] [address] [phone]    Electrophysiology  Jason Ortega MD  [address] [place] [address] [phone]    Eliseo Braxton MD  [address] [facility] [address] [phone]    Please follow up with your PCP as soon as possible.    If you are in need of a PCP or a specialist in your area: contact the [facility] Physician Referral Service at [phone].

## 2023-07-19 NOTE — DISCHARGE NOTE PROVIDER - HOSPITAL COURSE
HPI:  This is an 83 year old male with PMH of A fib, HFrEF, HTN, and hx of AVR who presented to the [facility] on 7/6 after fall. He was found to have a subdural/subarachnoid hemorrhage. Hospital course complicated by A fib with RVR s/p amiodarone and metoprolol. Per cardio recs, recommended by EPS and pt a good candidate for Watchman implant in future and will require outpatient f/u. Course further complicated by COVID (+) on 7/6. Repeat PCR on 7/11 negative. Patient evaluated by PT/OT and recommended for acute inpatient rehab. Patient is medically stable for DC to [facility] on 7/12.       Rehab course significant for orthostatic hypotension which resolved after adjustment to medications. His afib/arrhythmia remained stable on current regimen as well. He was also found to have a UTI and was treated with ceftin with improvement of symptoms.     Pt tolerated course of inpatient pt/ot/slp rehab with significant functional improvements and met rehab goals prior to discharge.     Pt was medically cleared on 8/4/23 for discharge to home with home care.        HPI:  This is an 83 year old male with PMH of A fib, HFrEF, HTN, and hx of AVR who presented to the [facility] on 7/6 after fall. He was found to have a subdural/subarachnoid hemorrhage. Hospital course complicated by A fib with RVR s/p amiodarone and metoprolol. Per cardio recs, recommended by EPS and pt a good candidate for Watchman implant in future and will require outpatient f/u. Course further complicated by COVID (+) on 7/6. Repeat PCR on 7/11 negative. Patient evaluated by PT/OT and recommended for acute inpatient rehab. Patient is medically stable for DC to [facility] on 7/12.       Rehab course significant for orthostatic hypotension which resolved after adjustment to medications. His afib/arrhythmia remained stable. He was also found to have a UTI and was treated with ceftin with improvement of symptoms. He was later found to have urinary retention which resolved.     Pt tolerated course of inpatient pt/ot/slp rehab with significant functional improvements and met rehab goals prior to discharge.     Pt was medically cleared on 8/8/23 for discharge to home with home care.        HPI:  This is an 83 year old male with PMH of A fib, HFrEF, HTN, and hx of AVR who presented to the [facility] on 7/6 after fall. He was found to have a subdural/subarachnoid hemorrhage. Hospital course complicated by A fib with RVR s/p amiodarone and metoprolol. Per cardio recs, recommended by EPS and pt a good candidate for Watchman implant in future and will require outpatient f/u. Course further complicated by COVID (+) on 7/6. Repeat PCR on 7/11 negative. Patient evaluated by PT/OT and recommended for acute inpatient rehab. Patient is medically stable for DC to [facility] on 7/12.       Rehab course significant for orthostatic hypotension which resolved after adjustment to medications. His afib/arrhythmia remained stable. He was also found to have a UTI and was treated with ceftin with improvement of symptoms. He was later found to have urinary retention which resolved but will require follow up with urology for further management.     Pt tolerated course of inpatient pt/ot/slp rehab with significant functional improvements and met rehab goals prior to discharge.     Pt was medically cleared on 8/8/23 for discharge to home with home care.        HPI:  This is an 83 year old male with PMH of A fib, HFrEF, HTN, and hx of AVR who presented to the [facility] on 7/6 after fall. He was found to have a subdural/subarachnoid hemorrhage. Hospital course complicated by A fib with RVR s/p amiodarone and metoprolol. Per cardio recs, recommended by EPS and pt a good candidate for Watchman implant in future and will require outpatient f/u. Course further complicated by COVID (+) on 7/6. Repeat PCR on 7/11 negative. Patient evaluated by PT/OT and recommended for acute inpatient rehab. Patient is medically stable for DC to [facility] on 7/12.       Rehab course significant for orthostatic hypotension which resolved after adjustment to medications. His afib/arrhythmia remained stable. He was also found to have a UTI and was treated with ceftin with improvement of symptoms. He was later found to have urinary retention which resolved but will require follow up with urology for further management.     Pt tolerated course of inpatient pt/ot/slp rehab with significant functional improvements and met rehab goals prior to discharge.     Pt was medically cleared on 8/9/23 for discharge to home with home care.

## 2023-07-19 NOTE — PROGRESS NOTE ADULT - ASSESSMENT
Assessment/Plan:  CHADD VALDIVIA is a 83y with PMH of A fib, HFrEF, HTN, and hx of AVR who presented to the [facility] on 7/6 after fall, found to have SDH/SAH.  Hospital Course complicated by A fib RVR and COVID (+). Patient now admitted for a multidisciplinary rehab program. 07-12-23 @ 15:20    * Vitals stabilize  * Improvement in therapy  * Confirm duration of Keppra form neurosurgeion    SAH/SDH  - Continue Keppra 500mg BID for seizure ppx  - Aspiration and fall precautions  - Continue comprehensive rehab program of PT/OT/SLP - 3 hours a day, 5 days a week.     A fib  - Course c/b A fib with RVR  - Will require outpatient cardio f/u, patient good candidate for Watchman implant in future  ( Dr Jonas, cardiologist at [facility])   - Continue amiodarone PO 200mg qd, Lopressor 50mg BID--dose reduced to 12.5mg bid due to hypotension, Farxiga 10mg qd     HFrEF  - EF 30%  - Continue amiodarone 200mg daily  - Caution with excessive fluids    COVID (+)  - PCR positive 7/7, repeat on 7/11 negative    HTN  - Continue lopressor---dose reduced to 12.5mg bid due to hypotension      BPH  - Continue Flomax 0.4mg nightly    Pain  - Tylenol PRN    Sleep  - Melatonin PRN     GI / Bowel  - Senna qHS  - Miralax PRN Daily  - GI ppx: Protonix     / Bladder  - Currently patient voids independently. Check PVRs on admission. Straight cath for > 400ccs    Skin / Pressure injury  - Skin assessment on admission performed: skin tear  x 2 to right forearm, blanchable redness to thoracic spine, moisture associated skin dermatitis to buttock, posterior scalp scab  - Turn q2 hours in bed while awake, air mattress  - nursing to monitor skin q Shift  - soft heel protectors  - skin barrier cream PRN    Diet/Dysphagia:  - Diet Consistency: Regular  - Aspiration Precautions  - on SLP f/u  - Nutrition f/u ongoing    DVT prophylaxis:   - SCD  - f/u Doppler on admission    --------------------------------------------  7/16--* Lab mild hyponatremia, Normal Hb and stable CKD    7/16--L;iaison with family--Ms Tavarez (daughter)   Daughter at bed side--gave collateral hx;  Patient on f/u with Dr Oliver at [facility] hosp service, being considered by ablation treatment or Watchman device for his A fib (alternative treatment due to hx of falls) prior to his fall and hosp admission with SDH  Had been independently functioning at baseline  I gave daughter details of patient's function, clinical progress including management of hypotension by reduction of metoprolol dose  I told her that we will be liaising with patient's cardiologist for post discharge plan  --------------------------------------------  IDT conference on 7/19  TDD: 7/26 to home  Barriers: Forgetful.  Social Work: Lives alone in  with 5-6 ISABEL with 1 HR and 14 STI. Has chair lift. Gets HHA 2-3 hrs 2x per week. Daughter lives close by.  OT: Setup for eating/grooming. CGA for all remaining ADLs/transfers.   PT: CGA for transfers. Ambulated 100 ft with RW with CGA. Negotiated 8 stairs with 2 HR with min A.   SLP: Regular with TLs. Mild cognitive deficits in short term memory. Needs cueing.  Will need family training.   --------------------------------------------  Outpatient Follow-up:  Specialty and Name of physician  Nephrology  Anshu Martino MD  [address]  [facility] [id number] [phone]    [facility]  [address], [address]  [place] [address] [phone]    Electrophysiology  Jason Ortega MD  [address] [place] [address] [phone]    Eliseo Braxton MD  [address] [facility] [address] [phone]    Code Status/Emergency Contact:    Non critical care  Time based billing   Spent 38 mins, patient review, observation in therapy, discussion of his medical management, dc planning and fall prevention  Assessment/Plan:  CHADD VALDIVIA is a 83y with PMH of A fib, HFrEF, HTN, and hx of AVR who presented to the [facility] on 7/6 after fall, found to have SDH/SAH.  Hospital Course complicated by A fib RVR and COVID (+). Patient now admitted for a multidisciplinary rehab program. 07-12-23 @ 15:20    * Low BP -reduce metoprolol succinate to 12.5mg daily  * Sustained improvement in therapy, IDT details and DC plan discussed   * We will confirm duration of Keppra form neurosurgeion    SAH/SDH  (Left frontal SDH)  - Continue Keppra 500mg BID for seizure ppx, will confirm duration of treatment with neurosurgeon/neurology prior to discharge  - Aspiration and fall precautions  - Continue comprehensive rehab program of PT/OT/SLP - 3 hours a day, 5 days a week.     A fib  - Course c/b A fib with RVR  - Will require outpatient cardio f/u, patient good candidate for Watchman implant in future  ( Dr Jonas, cardiologist at [facility])   - Continue amiodarone PO 200mg qd, Lopressor 50mg BID--dose reduced to 12.5mg bid due to hypotension, f7/19 reduced to 12.5mg daily  Farxiga 10mg qd discontinued      HFrEF  - EF 30%  - Continue amiodarone 200mg daily    COVID (+)  - PCR positive 7/7, repeat on 7/11 negative    HTN   (recent hypotension)   - Continue lopressor---dose reduced to 12.5mg daily 7/19      BPH  - Continue Flomax 0.4mg nightly    Pain  - continue Tylenol     Sleep  - Melatonin  9 mg qhs    GI / Bowel  - Senna qHS  - Miralax PRN Daily  - GI ppx: Protonix     / Bladder--voinding appropriately     Skin / Pressure injury  --Skin tear x 2 areas right forearm. continue  topical antibiotic cream (neomycin) and  protective dressing   - soft heel protectors      Diet/Dysphagia:  - Diet Consistency: Regular  - Aspiration Precautions  - on SLP f/u  - Nutrition f/u ongoing    DVT prophylaxis:   - SCD    # Health maintenance--Vit b12 and Folate in next blood test on 7/20  Full code  Living alone since death of wife 6yr ago  Independent with ADLs prior to admission    --------------------------------------------  7/16--Lab mild hyponatremia, Normal Hb and stable CKD    7/16--L;jackie with family--Ms Tavarez (daughter)   Daughter at bed side--gave collateral hx;  Patient on f/u with Dr Oliver at [facility] hosp service, being considered by ablation treatment or Watchman device for his A fib (alternative treatment due to hx of falls) prior to his fall and hosp admission with SDH  Had been independently functioning at baseline  I gave daughter details of patient's function, clinical progress including management of hypotension by reduction of metoprolol dose  I told her that we will be liaising with patient's cardiologist for post discharge plan    7/19--We discussed update of Ms Pimentel, patient's family, functional update and discharge plan. She was happy with the plan  --------------------------------------------  IDT conference on 7/19  TDD: 7/26 to home  Barriers: Forgetful.  Social Work: Lives alone in  with 5-6 IASBEL with 1 HR and 14 STI. Has chair lift. Gets HHA 2-3 hrs 2x per week. Daughter lives close by.  OT: Setup for eating/grooming. CGA for all remaining ADLs/transfers.   PT: CGA for transfers. Ambulated 100 ft with RW with CGA. Negotiated 8 stairs with 2 HR with min A.   SLP: Regular with TLs. Mild cognitive deficits in short term memory. Needs cueing.  Will need family training.   --------------------------------------------  Outpatient Follow-up:  Specialty and Name of physician  Nephrology  Anshu Martino MD  [address]  [place] [address] [phone]    [facility]  [address], [address]  [place] [address] [phone]    Electrophysiology  Jason Ortega MD  [address] [place] [address] [phone]    Eliseo Braxton MD  [address] [place] [address]  [phone]    Code Status/Emergency Contact:     Assessment/Plan:  CHADD VALDIVIA is a 83y with PMH of A fib, HFrEF, HTN, and hx of AVR who presented to the [facility] on 7/6 after fall, found to have SDH/SAH.  Hospital Course complicated by A fib RVR and COVID (+). Patient now admitted for a multidisciplinary rehab program. 07-12-23 @ 15:20    * Low BP -reduce metoprolol succinate to 12.5mg daily  * Sustained improvement in therapy, IDT details and DC plan discussed   * We will confirm duration of Keppra form neurosurgeion    SAH/SDH  (Left frontal SDH) --7/6  CTH 7/13--No interval change --Trace   subdural hemorrhage along the left side of the posterior falx measuring 2   to 3 mm.  - Continue Keppra 500mg BID for seizure ppx, will confirm duration of treatment with neurosurgeon/neurology prior to discharge  - Aspiration and fall precautions  - Continue comprehensive rehab program of PT/OT/SLP - 3 hours a day, 5 days a week.     A fib  - Course c/b A fib with RVR  - Will require outpatient cardio f/u, patient good candidate for Watchman implant in future  ( Dr Jonas, cardiologist at [facility])   - Continue amiodarone PO 200mg qd, Lopressor 50mg BID--dose reduced to 12.5mg bid due to hypotension, f7/19 reduced to 12.5mg daily  Farxiga 10mg qd discontinued      HFrEF  - EF 30%  - Continue amiodarone 200mg daily    COVID (+)  - PCR positive 7/7, repeat on 7/11 negative    HTN   (recent hypotension)   - Continue lopressor---dose reduced to 12.5mg daily 7/19      BPH  - Continue Flomax 0.4mg nightly    Pain  - continue Tylenol     Sleep  - Melatonin  9 mg qhs    GI / Bowel  - Senna qHS  - Miralax PRN Daily  - GI ppx: Protonix     / Bladder--voinding appropriately     Skin / Pressure injury  --Skin tear x 2 areas right forearm. continue  topical antibiotic cream (neomycin) and  protective dressing   - soft heel protectors      Diet/Dysphagia:  - Diet Consistency: Regular  - Aspiration Precautions  - on SLP f/u  - Nutrition f/u ongoing    DVT prophylaxis:   - SCD    # Health maintenance--Vit b12 and Folate in next blood test on 7/20  Full code  Living alone since death of wife 6yr ago  Independent with ADLs prior to admission    --------------------------------------------  7/16--Lab mild hyponatremia, Normal Hb and stable CKD    7/16--L;iaison with family--Ms Tavarez (daughter)   Daughter at bed side--gave collateral hx;  Patient on f/u with Dr Oliver at [facility] hosp service, being considered by ablation treatment or Watchman device for his A fib (alternative treatment due to hx of falls) prior to his fall and hosp admission with SDH  Had been independently functioning at baseline  I gave daughter details of patient's function, clinical progress including management of hypotension by reduction of metoprolol dose  I told her that we will be liaising with patient's cardiologist for post discharge plan    7/19--We discussed update of Ms Pimentel, patient's family, functional update and discharge plan. She was happy with the plan  --------------------------------------------  IDT conference on 7/19  TDD: 7/26 to home  Barriers: Forgetful.  Social Work: Lives alone in  with 5-6 ISABEL with 1 HR and 14 STI. Has chair lift. Gets HHA 2-3 hrs 2x per week. Daughter lives close by.  OT: Setup for eating/grooming. CGA for all remaining ADLs/transfers.   PT: CGA for transfers. Ambulated 100 ft with RW with CGA. Negotiated 8 stairs with 2 HR with min A.   SLP: Regular with TLs. Mild cognitive deficits in short term memory. Needs cueing.  Will need family training.   --------------------------------------------  Outpatient Follow-up:  Specialty and Name of physician  Nephrology  Anshu Martino MD  [address]  [facility] [address] [phone]    [facility]  [address], [address]  [place] [address] [phone]    Electrophysiology  Jason Ortega MD  [address] [place] [address] [phone]    Eliseo Braxton MD  [address] [facility] [id number] [phone]    Code Status/Emergency Contact:

## 2023-07-20 LAB
ANION GAP SERPL CALC-SCNC: 6 MMOL/L — SIGNIFICANT CHANGE UP (ref 5–17)
BUN SERPL-MCNC: 31 MG/DL — HIGH (ref 7–23)
CALCIUM SERPL-MCNC: 9.2 MG/DL — SIGNIFICANT CHANGE UP (ref 8.4–10.5)
CHLORIDE SERPL-SCNC: 97 MMOL/L — SIGNIFICANT CHANGE UP (ref 96–108)
CO2 SERPL-SCNC: 30 MMOL/L — SIGNIFICANT CHANGE UP (ref 22–31)
CREAT SERPL-MCNC: 1.51 MG/DL — HIGH (ref 0.5–1.3)
EGFR: 46 ML/MIN/1.73M2 — LOW
GLUCOSE SERPL-MCNC: 93 MG/DL — SIGNIFICANT CHANGE UP (ref 70–99)
HCT VFR BLD CALC: 37.3 % — LOW (ref 39–50)
HGB BLD-MCNC: 12.4 G/DL — LOW (ref 13–17)
MAGNESIUM SERPL-MCNC: 2 MG/DL — SIGNIFICANT CHANGE UP (ref 1.6–2.6)
MCHC RBC-ENTMCNC: 33.2 GM/DL — SIGNIFICANT CHANGE UP (ref 32–36)
MCHC RBC-ENTMCNC: 33.6 PG — SIGNIFICANT CHANGE UP (ref 27–34)
MCV RBC AUTO: 101.1 FL — HIGH (ref 80–100)
NRBC # BLD: 0 /100 WBCS — SIGNIFICANT CHANGE UP (ref 0–0)
PLATELET # BLD AUTO: 210 K/UL — SIGNIFICANT CHANGE UP (ref 150–400)
POTASSIUM SERPL-MCNC: 4.9 MMOL/L — SIGNIFICANT CHANGE UP (ref 3.5–5.3)
POTASSIUM SERPL-SCNC: 4.9 MMOL/L — SIGNIFICANT CHANGE UP (ref 3.5–5.3)
RBC # BLD: 3.69 M/UL — LOW (ref 4.2–5.8)
RBC # FLD: 14.2 % — SIGNIFICANT CHANGE UP (ref 10.3–14.5)
SODIUM SERPL-SCNC: 133 MMOL/L — LOW (ref 135–145)
VIT B12 SERPL-MCNC: 433 PG/ML — SIGNIFICANT CHANGE UP (ref 232–1245)
VIT D25+D1,25 OH+D1,25 PNL SERPL-MCNC: 21.9 PG/ML — SIGNIFICANT CHANGE UP (ref 19.9–79.3)
WBC # BLD: 7.38 K/UL — SIGNIFICANT CHANGE UP (ref 3.8–10.5)
WBC # FLD AUTO: 7.38 K/UL — SIGNIFICANT CHANGE UP (ref 3.8–10.5)

## 2023-07-20 PROCEDURE — 99222 1ST HOSP IP/OBS MODERATE 55: CPT

## 2023-07-20 PROCEDURE — 70450 CT HEAD/BRAIN W/O DYE: CPT | Mod: 26

## 2023-07-20 PROCEDURE — 99232 SBSQ HOSP IP/OBS MODERATE 35: CPT

## 2023-07-20 RX ORDER — SOD,AMMONIUM,POTASSIUM LACTATE
1 CREAM (GRAM) TOPICAL
Refills: 0 | Status: DISCONTINUED | OUTPATIENT
Start: 2023-07-20 | End: 2023-08-09

## 2023-07-20 RX ORDER — POLYETHYLENE GLYCOL 3350 17 G/17G
17 POWDER, FOR SOLUTION ORAL ONCE
Refills: 0 | Status: COMPLETED | OUTPATIENT
Start: 2023-07-20 | End: 2023-07-20

## 2023-07-20 RX ADMIN — Medication 125 MICROGRAM(S): at 05:33

## 2023-07-20 RX ADMIN — LEVETIRACETAM 500 MILLIGRAM(S): 250 TABLET, FILM COATED ORAL at 17:37

## 2023-07-20 RX ADMIN — Medication 1 APPLICATION(S): at 17:37

## 2023-07-20 RX ADMIN — Medication 9 MILLIGRAM(S): at 21:00

## 2023-07-20 RX ADMIN — AMIODARONE HYDROCHLORIDE 200 MILLIGRAM(S): 400 TABLET ORAL at 05:33

## 2023-07-20 RX ADMIN — BACITRACIN ZINC NEOMYCIN SULFATE POLYMYXIN B SULFATE 1 APPLICATION(S): 400; 3.5; 5 OINTMENT TOPICAL at 17:38

## 2023-07-20 RX ADMIN — ZINC OXIDE 1 APPLICATION(S): 200 OINTMENT TOPICAL at 17:38

## 2023-07-20 RX ADMIN — Medication 12.5 MILLIGRAM(S): at 21:00

## 2023-07-20 RX ADMIN — BACITRACIN ZINC NEOMYCIN SULFATE POLYMYXIN B SULFATE 1 APPLICATION(S): 400; 3.5; 5 OINTMENT TOPICAL at 05:35

## 2023-07-20 RX ADMIN — LEVETIRACETAM 500 MILLIGRAM(S): 250 TABLET, FILM COATED ORAL at 05:29

## 2023-07-20 NOTE — CONSULT NOTE ADULT - ASSESSMENT
s/p head trauma, bleed  af  postural and resting hypotension  remote avr      suggest  hydrate ( note increase creat)  ?eliminate flomax  consider midodrine  would continue low dose digoxin and amiodarone  could dc metoprolol, and consider increase amiodarone if rate increases  f/u ekg

## 2023-07-20 NOTE — PROGRESS NOTE ADULT - SUBJECTIVE AND OBJECTIVE BOX
CC: Traumatic subdural hemorrhage     Today's Subjective & Objective Findings  Patient seen and examined, at his bed side with NP Alea  He reports intermittent dizziness, most prominent during therapy and when standing  He reports minimal sleep, but better that his almost complete insomnia prior to admission, leading to his use of ambien and subsequent fall  He is tolerating oral diet    ROS  Denies headache, dizziness, visual changes, chest pain, SOB/JOLLY, abdominal pain, nausea, vomiting, diarrhea, dysuria, numbness or tingling  interval dizziness  LBM 7/18    Therapy-- Engaging, working on progressive ambulation with walker  Hypotensive in therapy as reported by Ttherapists    I discussed with daughter Ms Tavarez at bed side and reviewed patient's med records   Liaison with patient's cardiologist and he made recommendations regarding patient's treatment    Vital Signs Last 24 Hrs  T(C): 36.8 (20 Jul 2023 09:13), Max: 36.8 (20 Jul 2023 09:13)  T(F): 98.2 (20 Jul 2023 09:13), Max: 98.2 (20 Jul 2023 09:13)  HR: 100 (20 Jul 2023 09:13) (100 - 106)  BP: 98/62 (20 Jul 2023 09:13) (96/53 - 122/73)  RR: 16 (20 Jul 2023 09:13) (16 - 16)  SpO2: 97% (20 Jul 2023 09:13) (94% - 97%)  O2 Parameters below as of 20 Jul 2023 09:13  Patient On (Oxygen Delivery Method): room air    PHYSICAL EXAM:  Gen -  Comfortable, sitting in bed  HEENT - NAD  Neck - Supple,   Pulm - Clear  Cardiovascular - irregular , S1S2  Chest - vesicular breath sounds  Abdomen - Soft, non tender +BS  Extremities - No Cyanosis, no clubbing, no edema, no calf tenderness    Neuro-  AAO X 3,      Cranial Nerves - CN 2-12 intact     Motor - No focal deficits. 4+/5     Sensory - Intact  to LT      Reflexes -2+     Coordination -  no dysmetria     Tone - normal  MSK: Kyphosis  Psychiatric - Mood stable, Affect WNL  Skin:  skin tear  x 2 to right forearm,resolving  No edema    RECENT LABS/IMAGING                        12.4   7.38  )-----------( 210      ( 20 Jul 2023 06:55 )             37.3     07-20    133<L>  |  97  |  31<H>  ----------------------------<  93  4.9   |  30  |  1.51<H>    Ca    9.2      20 Jul 2023 06:55  Mg     2.0     07-20    Urinalysis Basic - ( 20 Jul 2023 06:55 )    Color: x / Appearance: x / SG: x / pH: x  Gluc: 93 mg/dL / Ketone: x  / Bili: x / Urobili: x   Blood: x / Protein: x / Nitrite: x   Leuk Esterase: x / RBC: x / WBC x   Sq Epi: x / Non Sq Epi: x / Bacteria: x    MEDICATIONS  (STANDING):  aMIOdarone    Tablet 200 milliGRAM(s) Oral daily  ammonium lactate 12% Lotion 1 Application(s) Topical two times a day  digoxin     Tablet 125 MICROGram(s) Oral daily  levETIRAcetam 500 milliGRAM(s) Oral two times a day  melatonin 9 milliGRAM(s) Oral at bedtime  metoprolol succinate ER 12.5 milliGRAM(s) Oral at bedtime  neomycin/bacitracin/polymyxin Topical Ointment 1 Application(s) Topical two times a day  polyethylene glycol 3350 17 Gram(s) Oral once  senna 2 Tablet(s) Oral at bedtime  tamsulosin 0.4 milliGRAM(s) Oral at bedtime  zinc oxide 40% Paste 1 Application(s) Topical two times a day    MEDICATIONS  (PRN):  acetaminophen     Tablet .. 650 milliGRAM(s) Oral every 6 hours PRN Mild Pain (1 - 3)  polyethylene glycol 3350 17 Gram(s) Oral daily PRN Constipation

## 2023-07-20 NOTE — PROGRESS NOTE ADULT - ASSESSMENT
Assessment/Plan:  CHADD VALDIVIA is a 83y with PMH of A fib, HFrEF, HTN, and hx of AVR who presented to the [facility] on 7/6 after fall, found to have SDH/SAH.  Hospital Course complicated by A fib RVR and COVID (+). Patient now admitted for a multidisciplinary rehab program. 07-12-23 @ 15:20    * Recurring dizziness--Cardiology (review cardiac meds and dizziness) , Neurology ( confirm duration of keppra, clearance to recommence anticoagulation post SDH)  * EKG--F/U check to conversion to sinus   * Sustained improvement in therapy, IDT details and DC plan discussed   * We will confirm duration of Keppra form neurosurgeion    SAH/SDH  (Left frontal SDH) --7/6  CTH 7/13--No interval change --Trace   subdural hemorrhage along the left side of the posterior falx measuring 2   to 3 mm.  - Continue Keppra 500mg BID for seizure ppx, will confirm duration of treatment with neurosurgeon/neurology prior to discharge  - Aspiration and fall precautions  - Continue comprehensive rehab program of PT/OT/SLP - 3 hours a day, 5 days a week.     A fib  - Course c/b A fib with RVR  - Will require outpatient cardio f/u, patient good candidate for Watchman implant in future  ( Dr Jonas, cardiologist at [facility])   - Continue amiodarone PO 200mg qd, Lopressor 50mg BID--dose reduced to 12.5mg bid due to hypotension, f7/19 reduced to 12.5mg daily  Farxiga 10mg qd discontinued   --7/20--D/w Dr Marquez, (patient's cardiologist)--recs to hold digoxin if apixiban is to be recommenced, and to c/w amiodarone and metoprolol  --Cardiology consulted to review cardiac meds in view of his dizziness     HFrEF  - EF 30%  - Continue amiodarone 200mg daily    COVID (+)  - PCR positive 7/7, repeat on 7/11 negative    HTN   (recent hypotension)   - Continue lopressor---dose reduced to 12.5mg daily 7/19      BPH/Urinary retention  - Continue Flomax 0.4mg nightly (home med prior to hosp admin0  --F/u with his urologist    Pain  - continue Tylenol     Sleep--hcornic insomina  - Melatonin  9 mg qhs  (fall while on ambien)    GI / Bowel  - Senna qHS  - Miralax PRN Daily  - GI ppx: Protonix     / Bladder--voinding appropriately     Skin / Pressure injury  --Skin tear x 2 areas right forearm. continue  topical antibiotic cream (neomycin) and  protective dressing   - soft heel protectors      Diet/Dysphagia:  - Diet Consistency: Regular  - Aspiration Precautions  - on SLP f/u  - Nutrition f/u ongoing    DVT prophylaxis:   - SCD    # Health maintenance--Vit b12 and Folate in next blood test on 7/20  Full code  Living alone since death of wife 6yr ago  Independent with ADLs prior to admission    --------------------------------------------  7/16--Lab mild hyponatremia, Normal Hb and stable CKD    7/16--L;iaison with family--Ms Tavarez (daughter)   Daughter at bed side--gave collateral hx;  Patient on f/u with Dr Oliver at [facility] service, being considered by ablation treatment or Watchman device for his A fib (alternative treatment due to hx of falls) prior to his fall and hosp admission with SDH  Had been independently functioning at baseline  I gave daughter details of patient's function, clinical progress including management of hypotension by reduction of metoprolol dose  I told her that we will be liaising with patient's cardiologist for post discharge plan    7/19--We discussed update of Ms Pimentel, patient's family, functional update and discharge plan. She was happy with the plan  7/20--Discussed with Mr Tavarez (daughter).-she shared patient's last hosp dc summary from [facility]--patient was on flomax,  digoxin. amiodarone, metorpolol faxiga, and other meds prior to rehab admission  He had been on apixiban prior to his fall on 7/16 and cardiology was plannig an ablation treatment vs watchman's device prior to his fall  Flomax was commenced post fall/SDH for urinary retention. She reports that patient's cardiologist had recommended to recommence AC  I called his cardiologist Dr Mcfarland today, and got upate  He did not recommend staring AC, rather, he recommended a neurology consult to get clearance for this     7/20  Liaison with providers. I called on  and spoke with Dr Mcfarland, patient's cardiologist--he gave background of patient's cardiac hx--Afib, awaiting ablation vs watchman's device implant prior to his fall  He recommended neurology consult for clearance prior to recommencing AC. He also suggested stopping digoxin AC is to be recommenced     --------------------------------------------  IDT conference on 7/19  TDD: 7/26 to home  Barriers: Forgetful.  Social Work: Lives alone in  with 5-6 ISABEL with 1 HR and 14 STI. Has chair lift. Gets HHA 2-3 hrs 2x per week. Daughter lives close by.  OT: Setup for eating/grooming. CGA for all remaining ADLs/transfers.   PT: CGA for transfers. Ambulated 100 ft with RW with CGA. Negotiated 8 stairs with 2 HR with min A.   SLP: Regular with TLs. Mild cognitive deficits in short term memory. Needs cueing.  Will need family training.   --------------------------------------------  Outpatient Follow-up:  Specialty and Name of physician  Nephrology  Anshu Martino MD  [facility] , [address]  [facility] [address]  [phone]    [facility]  [address], [address]  [place] [address] [phone]    Electrophysiology  Jason Ortega MD  [address] [place] [address] [phone]    Eliseo Braxton MD  [address] [facility] [address] [phone]    Code Status/Emergency Contact:

## 2023-07-20 NOTE — CONSULT NOTE ADULT - SUBJECTIVE AND OBJECTIVE BOX
CHIEF COMPLAINT:  Patient is a 83y old  Male who presents with a chief complaint of s/p subdural/subarachnoid hemorrhage (20 Jul 2023 13:56)    HPI:  This is an 83 year old male with PMH of A fib, HFrEF, HTN, and hx of AVR who presented to the [facility] on 7/6 after fall. He was found to have a subdural/subarachnoid hemorrhage. Hospital course complicated by A fib with RVR s/p amiodarone and metoprolol. Per cardio recs, recommended by EPS and pt a good candidate for Watchman implant in future and will require outpatient f/u. Course further complicated by COVID (+) on 7/6. Repeat PCR on 7/11 negative. Patient evaluated by PT/OT and recommended for acute inpatient rehab. Patient is medically stable for DC to [facility] on 7/12. (12 Jul 2023 15:07)    Seen for cardiac meds optimization, reported lightheaded. Patient seen interviewed and examined. Denies recent lightheadedness, but feels weak, fatigued. RN staff reports + postural bp drop. No c/p, sob. Prior AVR       PMH:   Aortic valve replaced    Hypertension    Atrial fibrillation    Congestive heart failure (CHF)        PSH:   Aortic valve replaced    S/P total knee arthroplasty    S/P hernia repair        FAMILY HISTORY:  FAMILY HISTORY:      SOCIAL HISTORY:  Smoking:  no  Alcohol:  Drugs:    ALLERGIES:  No Known Allergies      Home Medications:  amLODIPine:  orally  (12 Jan 2015 11:02)  aspirin 325 mg oral tablet: 1 tab(s) orally once a day (12 Jan 2015 11:02)  Naprosyn:  orally  (12 Jan 2015 11:02)      MEDICATIONS:  acetaminophen     Tablet .. 650 milliGRAM(s) Oral every 6 hours PRN  aMIOdarone    Tablet 200 milliGRAM(s) Oral daily  ammonium lactate 12% Lotion 1 Application(s) Topical two times a day  digoxin     Tablet 125 MICROGram(s) Oral daily  levETIRAcetam 500 milliGRAM(s) Oral two times a day  melatonin 9 milliGRAM(s) Oral at bedtime  metoprolol succinate ER 12.5 milliGRAM(s) Oral at bedtime  neomycin/bacitracin/polymyxin Topical Ointment 1 Application(s) Topical two times a day  polyethylene glycol 3350 17 Gram(s) Oral daily PRN  polyethylene glycol 3350 17 Gram(s) Oral once  senna 2 Tablet(s) Oral at bedtime  tamsulosin 0.4 milliGRAM(s) Oral at bedtime  zinc oxide 40% Paste 1 Application(s) Topical two times a day      REVIEW OF SYSTEMS:  CONSTITUTIONAL: Feels weak/fatigued    PHYSICAL EXAM:  T(C): 36.8 (07-20-23 @ 09:13), Max: 36.8 (07-20-23 @ 09:13)  HR: 100 (07-20-23 @ 09:13) (100 - 106)  BP: 98/62 (07-20-23 @ 09:13) (96/53 - 122/73)  RR: 16 (07-20-23 @ 09:13) (16 - 16)  SpO2: 97% (07-20-23 @ 09:13) (94% - 97%)  Wt(kg): --    GENERAL: NAD, well-groomed, well-developed  HEAD:  Atraumatic, Normocephalic  EYES: EOMI, conjunctiva and sclera clear  ENT: Moist mucous membranes,  NECK: Supple, No JVD, no bruits  CHEST/LUNG: Clear to ausculation and percussion bilaterally; No rales, rhonchi, wheezing, or rubs  HEART: irregular, no gallop  ABDOMEN: Soft, Nontender, Nondistended; Bowel sounds present  EXTREMITIES:  No clubbing, cyanosis, or edema  SKIN: No rashes or lesions  NERVOUS SYSTEM:  Alert     Cardiovascular Diagnostic Testing:  ECG:    < from: 12 Lead ECG (07.13.23 @ 21:13) >    Ventricular Rate 98 BPM    QRS Duration 124 ms    Q-T Interval 362 ms    QTC Calculation(Bazett) 462 ms    R Axis -63 degrees    T Axis 122 degrees    Diagnosis Line Atrial fibrillation  Left anterior fascicular block    No previous ECGs available  Confirmed by ALYCIA BUCIO MD ([id number]) on 7/14/2023 9:24:58 AM    < end of copied text >    LABS:                        12.4   7.38  )-----------( 210      ( 20 Jul 2023 06:55 )             37.3     07-20    133<L>  |  97  |  31<H>  ----------------------------<  93  4.9   |  30  |  1.51<H>    Ca    9.2      20 Jul 2023 06:55  Mg     2.0     07-20        IMAGING:  < from: US Duplex Venous Lower Ext Complete, Bilateral (07.13.23 @ 11:17) >    ACC: [id number] EXAM:  US DPLX LWR EXT VEINS COMPL BI   ORDERED BY:    PAMELLA LEE     PROCEDURE DATE:  07/13/2023          INTERPRETATION:  CLINICAL INFORMATION: Evaluate for DVT    COMPARISON: None available.    TECHNIQUE: Duplex sonography of the BILATERAL LOWER extremity veins with   color and spectral Doppler, with and without compression.    FINDINGS:    RIGHT:  Normal compressibility of the RIGHT common femoral, femoral and popliteal   veins.  Doppler examination shows normal spontaneous and phasic flow.  No RIGHT calf vein thrombosis is detected.    LEFT:  Normal compressibility of the LEFT common femoral, femoral and popliteal   veins.  Doppler examination shows normal spontaneous and phasic flow.  No LEFT calf vein thrombosisis detected.    IMPRESSION:  No evidence of deep venous thrombosis in either lower extremity.    < end of copied text >  < from: CT Head No Cont (07.13.23 @ 10:39) >    ACC: [id number] EXAM:  CT BRAIN   ORDERED BY: CLARA GALVEZ     PROCEDURE DATE:  07/13/2023          INTERPRETATION:  CT HEAD    CLINICAL HISTORY: f/up ICH    TECHNIQUE:  Noncontrast CT.  Axial Acquisition.  Sagittal and coronal reformations.    COMPARISON:  Prior outside studies unavailable for comparison at time of dictation    FINDINGS:  Mild hemorrhage and edema within the inferior aspect of the left frontal   lobe likely related to subacute trauma. Trace intraventricular hemorrhage   within the dependent portion of the left lateral ventricle. Trace   subdural hemorrhage along the left side of the posterior falx measuring 2   to 3 mm.    Mild atrophy. Mild periventricular white matter ischemic changes. Chronic   small bilateral cerebellar infarcts.    No hydrocephalus. No midline shift.    Linear nondisplaced right occipital calvarial fracture.    Visualized paranasal sinuses and mastoid air cells are well aerated.    IMPRESSION:  Posttraumatic cranial and intracranial injuries as described. No midline   shift or hydrocephalus. Interval follow-up recommended.    --- End of Report ---          < end of copied text >

## 2023-07-21 PROCEDURE — 99232 SBSQ HOSP IP/OBS MODERATE 35: CPT

## 2023-07-21 RX ORDER — MIDODRINE HYDROCHLORIDE 2.5 MG/1
2.5 TABLET ORAL
Refills: 0 | Status: DISCONTINUED | OUTPATIENT
Start: 2023-07-21 | End: 2023-07-23

## 2023-07-21 RX ORDER — POLYETHYLENE GLYCOL 3350 17 G/17G
17 POWDER, FOR SOLUTION ORAL ONCE
Refills: 0 | Status: COMPLETED | OUTPATIENT
Start: 2023-07-21 | End: 2023-07-21

## 2023-07-21 RX ADMIN — MIDODRINE HYDROCHLORIDE 2.5 MILLIGRAM(S): 2.5 TABLET ORAL at 13:55

## 2023-07-21 RX ADMIN — LEVETIRACETAM 500 MILLIGRAM(S): 250 TABLET, FILM COATED ORAL at 05:17

## 2023-07-21 RX ADMIN — Medication 1 APPLICATION(S): at 18:01

## 2023-07-21 RX ADMIN — AMIODARONE HYDROCHLORIDE 200 MILLIGRAM(S): 400 TABLET ORAL at 05:17

## 2023-07-21 RX ADMIN — LEVETIRACETAM 500 MILLIGRAM(S): 250 TABLET, FILM COATED ORAL at 18:00

## 2023-07-21 RX ADMIN — BACITRACIN ZINC NEOMYCIN SULFATE POLYMYXIN B SULFATE 1 APPLICATION(S): 400; 3.5; 5 OINTMENT TOPICAL at 18:01

## 2023-07-21 RX ADMIN — POLYETHYLENE GLYCOL 3350 17 GRAM(S): 17 POWDER, FOR SOLUTION ORAL at 05:17

## 2023-07-21 RX ADMIN — Medication 125 MICROGRAM(S): at 05:17

## 2023-07-21 RX ADMIN — Medication 1 APPLICATION(S): at 05:18

## 2023-07-21 RX ADMIN — POLYETHYLENE GLYCOL 3350 17 GRAM(S): 17 POWDER, FOR SOLUTION ORAL at 18:47

## 2023-07-21 RX ADMIN — BACITRACIN ZINC NEOMYCIN SULFATE POLYMYXIN B SULFATE 1 APPLICATION(S): 400; 3.5; 5 OINTMENT TOPICAL at 05:23

## 2023-07-21 RX ADMIN — ZINC OXIDE 1 APPLICATION(S): 200 OINTMENT TOPICAL at 17:51

## 2023-07-21 RX ADMIN — Medication 9 MILLIGRAM(S): at 21:16

## 2023-07-21 NOTE — PROGRESS NOTE ADULT - ASSESSMENT
84 yo with PMH of A fib, HFrEF, HTN, and hx of AVR presented to [facility] on 7/6 after fall, found to have SDH/SAH. Hospital Course complicated by A fib RVR and COVID (+). Patient now admitted to [facility] for a multidisciplinary rehab program.     #SAH/SDH  - Continue Keppra 500 mg BID for seizure ppx. duration per neuro  - Eliquis (for AF) on hold  - Aspiration and fall precautions  - Rehab, pain and bowel regimen per primary team    #HFrEF  # chronic A fib  #HTN  # orthostatic hypotension  - EF 30%  - on amiodarone 200 mg qd, digoxin 125 mcg qd, Lopressor 25 mg BID, Farxiga 10 mg qd  - Metoprolol dose was changed from 50 to 25 mg BID on 7/14 for OH, was changed further to metoprolol ER 12.5 mg BID on 7/15. changed to Metoprolol ER 12,5 mg daily and place on h/s instead of am [ guideline directed therapy]. held meorpol today 7/21.   - added midodrine 2.5 mg BID 7/21  - held Farxiga for soft BP   - not a candidate for MRA,ACE/ARBS/ARNI due to OH  - Caution with IVF  - Outpatient cardio follow up for consideration of Watchman - [facility] Dr. Braxton      #CKD3  - Unknown baseline  - Avoid nephrotoxins  - Monitor    #BPH  - held Flomax 0.4 mg nightly 7/20 for OH. resume as able  - monitor UO    #COVID-19 Infection  - PCR positive 7/7, repeat on 7/11 negative  - Monitor     #DVT prophylaxis  - SCDs  - Dopplers on admission 6/13 negative for DVT    d/w rehab team at [facility]

## 2023-07-21 NOTE — PROGRESS NOTE ADULT - SUBJECTIVE AND OBJECTIVE BOX
Medicine Progress Note    Patient is a 83y old  Male who presents with a chief complaint of s/p subdural/subarachnoid hemorrhage (20 Jul 2023 15:11)      SUBJECTIVE / OVERNIGHT EVENTS:  no complaints    ADDITIONAL REVIEW OF SYSTEMS:  denied fever/chills/CP/SOB/cough/palpitation/dizziness/abdominal pian/nausea/vomiting/diarrhoea/constipation/dysuria/leg or calf pain/headaches.all other ROS neg    MEDICATIONS  (STANDING):  aMIOdarone    Tablet 200 milliGRAM(s) Oral daily  ammonium lactate 12% Lotion 1 Application(s) Topical two times a day  digoxin     Tablet 125 MICROGram(s) Oral daily  levETIRAcetam 500 milliGRAM(s) Oral two times a day  melatonin 9 milliGRAM(s) Oral at bedtime  midodrine. 2.5 milliGRAM(s) Oral <User Schedule>  neomycin/bacitracin/polymyxin Topical Ointment 1 Application(s) Topical two times a day  senna 2 Tablet(s) Oral at bedtime  zinc oxide 40% Paste 1 Application(s) Topical two times a day    MEDICATIONS  (PRN):  acetaminophen     Tablet .. 650 milliGRAM(s) Oral every 6 hours PRN Mild Pain (1 - 3)  polyethylene glycol 3350 17 Gram(s) Oral daily PRN Constipation    CAPILLARY BLOOD GLUCOSE        I&O's Summary      PHYSICAL EXAM:  Vital Signs Last 24 Hrs  T(C): 36.9 (21 Jul 2023 08:47), Max: 36.9 (21 Jul 2023 08:47)  T(F): 98.5 (21 Jul 2023 08:47), Max: 98.5 (21 Jul 2023 08:47)  HR: 104 (21 Jul 2023 08:47) (104 - 104)  BP: 104/60 (21 Jul 2023 08:47) (101/68 - 104/60)  BP(mean): --  RR: 16 (21 Jul 2023 08:47) (16 - 16)  SpO2: 95% (21 Jul 2023 08:47) (95% - 96%)    Parameters below as of 21 Jul 2023 08:47  Patient On (Oxygen Delivery Method): room air      GENERAL: Not in distress. Alert    HEENT: clear conjuctiva, MMM. no pallor or icterus  CARDIOVASCULAR: RRR S1, S2. No murmur/rubs/gallop  LUNGS: BLAE+, no rales, no wheezing, no rhonchi.    ABDOMEN: ND. Soft,  NT, no guarding / rebound / rigidity. BS normoactive  EXTREMITIES: no edema. no leg or calf TP.  SKIN: warm and dry  PSYCHIATRIC: Calm.  No agitation.  CNS: AAO. moves limbs, follows commands      LABS:                        12.4   7.38  )-----------( 210      ( 20 Jul 2023 06:55 )             37.3     07-20    133<L>  |  97  |  31<H>  ----------------------------<  93  4.9   |  30  |  1.51<H>    Ca    9.2      20 Jul 2023 06:55  Mg     2.0     07-20            Urinalysis Basic - ( 20 Jul 2023 06:55 )    Color: x / Appearance: x / SG: x / pH: x  Gluc: 93 mg/dL / Ketone: x  / Bili: x / Urobili: x   Blood: x / Protein: x / Nitrite: x   Leuk Esterase: x / RBC: x / WBC x   Sq Epi: x / Non Sq Epi: x / Bacteria: x            RADIOLOGY & ADDITIONAL TESTS:  Imaging from Last 24 Hours:    Electrocardiogram/QTc Interval:    COORDINATION OF CARE:  Care Discussed with Consultants/Other Providers:

## 2023-07-21 NOTE — PROGRESS NOTE ADULT - SUBJECTIVE AND OBJECTIVE BOX
CC: Traumatic subdural hemorrhage     Today's Subjective & Objective Findings  Patient seen and examined, at bed side.  Daughter at bedside   Admits to intermittent sleep - chronic insomnia.  Dizziness overall improving.  Last BM on 7/18, per chart review.   Discussed using Miralax today - pt agreeable.   Discussed DC of Flomax per cardiology for OH.  No other complaints at this time.     ROS  Denies headache, dizziness, visual changes, chest pain, SOB/JOLLY, abdominal pain, nausea, vomiting, diarrhea, dysuria, numbness or tingling  interval dizziness    Therapy-- Engaging, working on progressive ambulation with walker  Hypotensive in therapy as reported by Ttherapists      Vital Signs Last 24 Hrs  T(C): 36.9 (21 Jul 2023 08:47), Max: 36.9 (21 Jul 2023 08:47)  T(F): 98.5 (21 Jul 2023 08:47), Max: 98.5 (21 Jul 2023 08:47)  HR: 104 (21 Jul 2023 08:47) (104 - 104)  BP: 104/60 (21 Jul 2023 08:47) (101/68 - 104/60)  BP(mean): --  RR: 16 (21 Jul 2023 08:47) (16 - 16)  SpO2: 95% (21 Jul 2023 08:47) (95% - 96%)      PHYSICAL EXAM:  Gen -  Comfortable, sitting in bed  HEENT - NAD  Neck - Supple,   Pulm - Clear  Cardiovascular - irregular , S1S2  Chest - vesicular breath sounds  Abdomen - Soft, non tender +BS  Extremities - No Cyanosis, no clubbing, no edema, no calf tenderness    Neuro-  AAO X 3,      Cranial Nerves - CN 2-12 intact     Motor - No focal deficits. 4+/5     Sensory - Intact  to LT      Reflexes -2+     Coordination -  no dysmetria     Tone - normal  MSK: Kyphosis  Psychiatric - Mood stable, Affect WNL  Skin:  skin tear  x 2 to right forearm,resolving  No edema    RECENT LABS/IMAGING                        12.4   7.38  )-----------( 210      ( 20 Jul 2023 06:55 )             37.3     07-20    133<L>  |  97  |  31<H>  ----------------------------<  93  4.9   |  30  |  1.51<H>    Ca    9.2      20 Jul 2023 06:55  Mg     2.0     07-20    Urinalysis Basic - ( 20 Jul 2023 06:55 )    Color: x / Appearance: x / SG: x / pH: x  Gluc: 93 mg/dL / Ketone: x  / Bili: x / Urobili: x   Blood: x / Protein: x / Nitrite: x   Leuk Esterase: x / RBC: x / WBC x   Sq Epi: x / Non Sq Epi: x / Bacteria: x    MEDICATIONS  (STANDING):  aMIOdarone    Tablet 200 milliGRAM(s) Oral daily  ammonium lactate 12% Lotion 1 Application(s) Topical two times a day  digoxin     Tablet 125 MICROGram(s) Oral daily  levETIRAcetam 500 milliGRAM(s) Oral two times a day  melatonin 9 milliGRAM(s) Oral at bedtime  midodrine. 2.5 milliGRAM(s) Oral <User Schedule>  neomycin/bacitracin/polymyxin Topical Ointment 1 Application(s) Topical two times a day  senna 2 Tablet(s) Oral at bedtime  zinc oxide 40% Paste 1 Application(s) Topical two times a day    MEDICATIONS  (PRN):  acetaminophen     Tablet .. 650 milliGRAM(s) Oral every 6 hours PRN Mild Pain (1 - 3)  polyethylene glycol 3350 17 Gram(s) Oral daily PRN Constipation

## 2023-07-21 NOTE — PROGRESS NOTE ADULT - ASSESSMENT
Assessment/Plan:  CHADD VALDIVIA is a 83y with PMH of A fib, HFrEF, HTN, and hx of AVR who presented to the [facility] on 7/6 after fall, found to have SDH/SAH.  Hospital Course complicated by A fib RVR and COVID (+). Patient now admitted for a multidisciplinary rehab program. 07-12-23 @ 15:20    * Dizziness - Cardiology following - Flomax DCd   * Start Midodrine  * Repeat CTH (7/20) reviewed   * Await return call from [facility] [place], [place] for AC and Keppra duration     SAH/SDH  (Left frontal SDH) --7/6  CTH 7/13--No interval change --Trace   subdural hemorrhage along the left side of the posterior falx measuring 2   to 3 mm.  - Continue Keppra 500mg BID for seizure ppx, will confirm duration of treatment with neurosurgeon/neurology prior to discharge  - Aspiration and fall precautions  - Continue comprehensive rehab program of PT/OT/SLP - 3 hours a day, 5 days a week.   - Repeat CTH (7/20): Evolution of hemorrhagic contusion in the anterior inferior LEFT frontal lobe. Scant focus of hemorrhage in the dependent portion of the LEFT lateral ventricle. Resolution of LEFT parafalcine subdural hemorrhage Mild to moderate periventricular white matter ischemia is noted.    A fib  - Course c/b A fib with RVR  - Will require outpatient cardio f/u, patient good candidate for Watchman implant in future  ( Dr Jonas, cardiologist at [facility])   - Amiodarone  - Metoprolol DCd due to hypotension/OH   --7/20--D/w Dr Marquez, (patient's cardiologist)--recs to hold digoxin if apixiban is to be recommenced, and to c/w amiodarone and metoprolol  --Cardiology consulted to review cardiac meds in view of his dizziness - Flomax DCd 7/20      HFrEF  - EF 30%  - Continue amiodarone 200mg daily    COVID (+)  - PCR positive 7/7, repeat on 7/11 negative    HTN   (recent hypotension)   - Continue lopressor---dose reduced to 12.5mg daily 7/19    Pain  - continue Tylenol     Sleep--hcornic insomina  - Melatonin  9 mg qhs  (fall while on ambien)    GI / Bowel  - Senna qHS  - Miralax PRN Daily  - GI ppx: Protonix    /Bladder  BPH/Urinary retention  - Flomax DCd 7/20 due to possible OH contribution  --F/u with his urologist    Skin / Pressure injury  --Skin tear x 2 areas right forearm. continue  topical antibiotic cream (neomycin) and  protective dressing   - soft heel protectors    Diet/Dysphagia:  - Diet Consistency: Regular  - Aspiration Precautions  - on SLP f/u  - Nutrition f/u ongoing    DVT prophylaxis:   - SCD    # Health maintenance  - Vitamin B12 and Folate labs - normal       Full code  Living alone since death of wife 6yr ago  Independent with ADLs prior to admission  --------------------------------------------  7/16--Lab mild hyponatremia, Normal Hb and stable CKD    7/16--L;iaison with family--Ms Tavarez (daughter)   Daughter at bed side--gave collateral hx;  Patient on f/u with Dr Oliver at [facility] service, being considered by ablation treatment or Watchman device for his A fib (alternative treatment due to hx of falls) prior to his fall and hosp admission with SDH  Had been independently functioning at baseline  I gave daughter details of patient's function, clinical progress including management of hypotension by reduction of metoprolol dose  I told her that we will be liaising with patient's cardiologist for post discharge plan    7/19--We discussed update of Ms Pimentel, patient's family, functional update and discharge plan. She was happy with the plan  7/20--Discussed with Mr Tavarez (daughter).-she shared patient's last hosp dc summary from [facility]--patient was on flomax,  digoxin. amiodarone, metorpolol faxiga, and other meds prior to rehab admission  He had been on apixiban prior to his fall on 7/16 and cardiology was plannig an ablation treatment vs watchman's device prior to his fall  Flomax was commenced post fall/SDH for urinary retention. She reports that patient's cardiologist had recommended to recommence AC  I called his cardiologist Dr Mcfarland today, and got upate  He did not recommend staring AC, rather, he recommended a neurology consult to get clearance for this     7/20  Liaison with providers. I called on  and spoke with Dr Mcfarland, patient's cardiologist--he gave background of patient's cardiac hx--Afib, awaiting ablation vs watchman's device implant prior to his fall  He recommended neurology consult for clearance prior to recommencing AC. He also suggested stopping digoxin AC is to be recommenced     --------------------------------------------  IDT conference on 7/19  TDD: 7/26 to home  Barriers: Forgetful.  Social Work: Lives alone in  with 5-6 ISABEL with 1 HR and 14 STI. Has chair lift. Gets HHA 2-3 hrs 2x per week. Daughter lives close by.  OT: Setup for eating/grooming. CGA for all remaining ADLs/transfers.   PT: CGA for transfers. Ambulated 100 ft with RW with CGA. Negotiated 8 stairs with 2 HR with min A.   SLP: Regular with TLs. Mild cognitive deficits in short term memory. Needs cueing.  Will need family training.   --------------------------------------------  Outpatient Follow-up:  Specialty and Name of physician  Nephrology  Anshu Martino MD  [address] [place] [address], [address]  [facility] [id number] [phone]    [facility]  [address], [address]  [place] [address] [phone]    Electrophysiology  Jason Ortega MD  [address] [place] [address] [phone]    Eliseo Braxton MD  [address] [facility] [address] [phone]    Code Status/Emergency Contact:     Assessment/Plan:  CHADD VALDIVIA is a 83y with PMH of A fib, HFrEF, HTN, and hx of AVR who presented to the [facility] on 7/6 after fall, found to have SDH/SAH.  Hospital Course complicated by A fib RVR and COVID (+). Patient now admitted for a multidisciplinary rehab program. 07-12-23 @ 15:20    * Dizziness - Cardiology following - Flomax DCd   * Start Midodrine  * Repeat CTH (7/20) reviewed   * [place] [facility] [place]     SAH/SDH  (Left frontal SDH) --7/6  CTH 7/13--No interval change --Trace   subdural hemorrhage along the left side of the posterior falx measuring 2   to 3 mm.  - Continue Keppra 500mg BID for seizure ppx- last dose 7/23 PM   - Aspiration and fall precautions  - Continue comprehensive rehab program of PT/OT/SLP - 3 hours a day, 5 days a week.   - Repeat CTH (7/20): Evolution of hemorrhagic contusion in the anterior inferior LEFT frontal lobe. Scant focus of hemorrhage in the dependent portion of the LEFT lateral ventricle. Resolution of LEFT parafalcine subdural hemorrhage Mild to moderate periventricular white matter ischemia is noted.    A fib  - Course c/b A fib with RVR  - Will require outpatient cardio f/u, patient good candidate for Watchman implant in future  ( Dr Jonas, cardiologist at [facility])   - Amiodarone  - Metoprolol DCd due to hypotension/OH   --7/20--D/w Dr Marquez, (patient's cardiologist)--recs to hold digoxin if apixiban is to be recommenced, and to c/w amiodarone and metoprolol  --Cardiology consulted to review cardiac meds in view of his dizziness - Flomax DCd 7/20      HFrEF  - EF 30%  - Continue amiodarone 200mg daily    COVID (+)  - PCR positive 7/7, repeat on 7/11 negative    HTN   (recent hypotension)   - Continue lopressor---dose reduced to 12.5mg daily 7/19    Pain  - continue Tylenol     Sleep--hcornic insomina  - Melatonin  9 mg qhs  (fall while on ambien)    GI / Bowel  - Senna qHS  - Miralax PRN Daily  - GI ppx: Protonix    /Bladder  BPH/Urinary retention  - Flomax DCd 7/20 due to possible OH contribution  --F/u with his urologist    Skin / Pressure injury  --Skin tear x 2 areas right forearm. continue  topical antibiotic cream (neomycin) and  protective dressing   - soft heel protectors    Diet/Dysphagia:  - Diet Consistency: Regular  - Aspiration Precautions  - on SLP f/u  - Nutrition f/u ongoing    DVT prophylaxis:   - SCD    # Health maintenance  - Vitamin B12 and Folate labs - normal       Full code  Living alone since death of wife 6yr ago  Independent with ADLs prior to admission  --------------------------------------------  7/16--Lab mild hyponatremia, Normal Hb and stable CKD    7/16--L;iaison with family--Ms Tavarez (daughter)   Daughter at bed side--gave collateral hx;  Patient on f/u with Dr Oliver at [facility] service, being considered by ablation treatment or Watchman device for his A fib (alternative treatment due to hx of falls) prior to his fall and hosp admission with SDH  Had been independently functioning at baseline  I gave daughter details of patient's function, clinical progress including management of hypotension by reduction of metoprolol dose  I told her that we will be liaising with patient's cardiologist for post discharge plan    7/19--We discussed update of Ms Pimentel, patient's family, functional update and discharge plan. She was happy with the plan  7/20--Discussed with Mr Tavarez (daughter).-she shared patient's last hosp dc summary from [facility]--patient was on flomax,  digoxin. amiodarone, metorpolol faxiga, and other meds prior to rehab admission  He had been on apixiban prior to his fall on 7/16 and cardiology was plannig an ablation treatment vs watchman's device prior to his fall  Flomax was commenced post fall/SDH for urinary retention. She reports that patient's cardiologist had recommended to recommence AC  I called his cardiologist Dr Mcfarland today, and got upate  He did not recommend staring AC, rather, he recommended a neurology consult to get clearance for this     7/20  Liaison with providers. I called on  and spoke with Dr Mcfarland, patient's cardiologist--he gave background of patient's cardiac hx--Afib, awaiting ablation vs watchman's device implant prior to his fall  He recommended neurology consult for clearance prior to recommencing AC. He also suggested stopping digoxin AC is to be recommenced     --------------------------------------------  IDT conference on 7/19  TDD: 7/26 to home  Barriers: Forgetful.  Social Work: Lives alone in  with 5-6 ISABEL with 1 HR and 14 STI. Has chair lift. Gets HHA 2-3 hrs 2x per week. Daughter lives close by.  OT: Setup for eating/grooming. CGA for all remaining ADLs/transfers.   PT: CGA for transfers. Ambulated 100 ft with RW with CGA. Negotiated 8 stairs with 2 HR with min A.   SLP: Regular with TLs. Mild cognitive deficits in short term memory. Needs cueing.  Will need family training.   --------------------------------------------  Outpatient Follow-up:  Specialty and Name of physician  Nephrology  Anshu Martino MD  [address] [place] [address], [address]  [facility] [id number] [phone]    [facility]  [address], [address]  [place] [address] [phone]    Electrophysiology  Jason Ortega MD  [address] [place] [address] [phone]    Eliseo Braxton MD  [address] [place] [place]. \[facility]"" [address] [phone]    Code Status/Emergency Contact:

## 2023-07-22 PROCEDURE — 99232 SBSQ HOSP IP/OBS MODERATE 35: CPT

## 2023-07-22 RX ADMIN — SENNA PLUS 2 TABLET(S): 8.6 TABLET ORAL at 20:31

## 2023-07-22 RX ADMIN — LEVETIRACETAM 500 MILLIGRAM(S): 250 TABLET, FILM COATED ORAL at 17:58

## 2023-07-22 RX ADMIN — Medication 9 MILLIGRAM(S): at 20:31

## 2023-07-22 RX ADMIN — LEVETIRACETAM 500 MILLIGRAM(S): 250 TABLET, FILM COATED ORAL at 05:30

## 2023-07-22 RX ADMIN — Medication 1 APPLICATION(S): at 17:59

## 2023-07-22 RX ADMIN — MIDODRINE HYDROCHLORIDE 2.5 MILLIGRAM(S): 2.5 TABLET ORAL at 05:31

## 2023-07-22 RX ADMIN — AMIODARONE HYDROCHLORIDE 200 MILLIGRAM(S): 400 TABLET ORAL at 05:30

## 2023-07-22 RX ADMIN — BACITRACIN ZINC NEOMYCIN SULFATE POLYMYXIN B SULFATE 1 APPLICATION(S): 400; 3.5; 5 OINTMENT TOPICAL at 05:40

## 2023-07-22 RX ADMIN — MIDODRINE HYDROCHLORIDE 2.5 MILLIGRAM(S): 2.5 TABLET ORAL at 12:48

## 2023-07-22 RX ADMIN — Medication 125 MICROGRAM(S): at 05:31

## 2023-07-22 NOTE — PROGRESS NOTE ADULT - ASSESSMENT
Imp: Patient with diagnosis of   traumatic SD hemorhhage       admitted for comprehensive acute rehabilitation.    Plan:  - Continue PT/OT/SLP  - DVT prophylaxis  - Skin- Turn q2h, check skin daily  - Continue current medications; patient medically stable.   -Active issues-   - Patient is stable to continue current rehabilitation program.

## 2023-07-22 NOTE — PROGRESS NOTE ADULT - ASSESSMENT
84 yo with PMH of A fib, HFrEF, HTN, and hx of AVR presented to [facility] on 7/6 after fall, found to have SDH/SAH. Hospital Course complicated by A fib RVR and COVID (+). Patient now admitted to [facility] for a multidisciplinary rehab program.     #SAH/SDH  - Continue Keppra 500 mg BID for seizure ppx. duration per neuro  - Eliquis (for AF) on hold  - Aspiration and fall precautions  - Rehab, pain and bowel regimen per primary team    #HFrEF  # chronic A fib  #HTN  # orthostatic hypotension  - EF 30%  - on amiodarone 200 mg qd, digoxin 125 mcg qd, Lopressor 25 mg BID, Farxiga 10 mg qd  - Metoprolol dose was changed from 50 to 25 mg BID on 7/14 for OH, was changed further to metoprolol ER 12.5 mg BID on 7/15. changed to Metoprolol ER 12,5 mg daily and place on h/s instead of am [ guideline directed therapy]. held meorpol today 7/21.   - added midodrine 2.5 mg BID 7/21. continue same for now as OH better with it  - held Farxiga for soft BP   - not a candidate for MRA,ACE/ARBS/ARNI due to OH  - Caution with IVF  - Outpatient cardio follow up for consideration of Watchman - [facility] Dr. Braxton      #CKD3  - Unknown baseline  - Avoid nephrotoxins  - Monitor    #BPH  - held Flomax 0.4 mg nightly 7/20 for OH. resume as able  - monitor UO    #COVID-19 Infection  - PCR positive 7/7, repeat on 7/11 negative  - Monitor     #DVT prophylaxis  - SCDs  - Dopplers on admission 6/13 negative for DVT    d/w rehab team at [facility]

## 2023-07-22 NOTE — PROGRESS NOTE ADULT - SUBJECTIVE AND OBJECTIVE BOX
Cc: Gait dysfunction    HPI: Patient with no new medical issues overnight.  Pain controlled, no chest pain, no N/V, no Fevers/Chills. No other new ROS  Has been tolerating rehabilitation program.    acetaminophen     Tablet .. 650 milliGRAM(s) Oral every 6 hours PRN  aMIOdarone    Tablet 200 milliGRAM(s) Oral daily  ammonium lactate 12% Lotion 1 Application(s) Topical two times a day  digoxin     Tablet 125 MICROGram(s) Oral daily  levETIRAcetam 500 milliGRAM(s) Oral two times a day  melatonin 9 milliGRAM(s) Oral at bedtime  midodrine. 2.5 milliGRAM(s) Oral <User Schedule>  neomycin/bacitracin/polymyxin Topical Ointment 1 Application(s) Topical two times a day  polyethylene glycol 3350 17 Gram(s) Oral daily PRN  senna 2 Tablet(s) Oral at bedtime  zinc oxide 40% Paste 1 Application(s) Topical two times a day      T(C): 36.6 (07-22-23 @ 08:35), Max: 36.9 (07-21-23 @ 21:18)  HR: 109 (07-22-23 @ 08:35) (94 - 112)  BP: 119/79 (07-22-23 @ 08:35) (115/78 - 129/71)  RR: 16 (07-22-23 @ 08:35) (15 - 16)  SpO2: 96% (07-22-23 @ 08:35) (95% - 96%)    In NAD  HEENT- EOMI  Heart- RRR, S1S2  Lungs- CTA bl.  Abd- + BS, NT  Ext- No calf pain  Neuro- Exam unchanged

## 2023-07-22 NOTE — PROGRESS NOTE ADULT - SUBJECTIVE AND OBJECTIVE BOX
Medicine Progress Note    Patient is a 83y old  Male who presents with a chief complaint of s/p subdural/subarachnoid hemorrhage (21 Jul 2023 11:52)      SUBJECTIVE / OVERNIGHT EVENTS:  no compplaints    ADDITIONAL REVIEW OF SYSTEMS:  denied fever/chills/CP/SOB/cough/palpitation/dizziness/abdominal pian/nausea/vomiting/diarrhoea/constipation/dysuria/leg or calf pain/headaches.all other ROS neg    MEDICATIONS  (STANDING):  aMIOdarone    Tablet 200 milliGRAM(s) Oral daily  ammonium lactate 12% Lotion 1 Application(s) Topical two times a day  digoxin     Tablet 125 MICROGram(s) Oral daily  levETIRAcetam 500 milliGRAM(s) Oral two times a day  melatonin 9 milliGRAM(s) Oral at bedtime  midodrine. 2.5 milliGRAM(s) Oral <User Schedule>  neomycin/bacitracin/polymyxin Topical Ointment 1 Application(s) Topical two times a day  senna 2 Tablet(s) Oral at bedtime  zinc oxide 40% Paste 1 Application(s) Topical two times a day    MEDICATIONS  (PRN):  acetaminophen     Tablet .. 650 milliGRAM(s) Oral every 6 hours PRN Mild Pain (1 - 3)  polyethylene glycol 3350 17 Gram(s) Oral daily PRN Constipation    CAPILLARY BLOOD GLUCOSE        I&O's Summary    22 Jul 2023 07:01  -  22 Jul 2023 14:46  --------------------------------------------------------  IN: 240 mL / OUT: 200 mL / NET: 40 mL        PHYSICAL EXAM:  Vital Signs Last 24 Hrs  T(C): 36.6 (22 Jul 2023 08:35), Max: 36.9 (21 Jul 2023 21:18)  T(F): 97.8 (22 Jul 2023 08:35), Max: 98.4 (21 Jul 2023 21:18)  HR: 109 (22 Jul 2023 08:35) (94 - 112)  BP: 119/79 (22 Jul 2023 08:35) (115/78 - 129/71)  BP(mean): --  RR: 16 (22 Jul 2023 08:35) (15 - 16)  SpO2: 96% (22 Jul 2023 08:35) (95% - 96%)    Parameters below as of 22 Jul 2023 08:35  Patient On (Oxygen Delivery Method): room air    GENERAL: Not in distress. Alert    HEENT: clear conjuctiva, MMM. no pallor or icterus  CARDIOVASCULAR: RRR S1, S2. No murmur/rubs/gallop  LUNGS: BLAE+, no rales, no wheezing, no rhonchi.    ABDOMEN: ND. Soft,  NT, no guarding / rebound / rigidity. BS normoactive  EXTREMITIES: no edema. no leg or calf TP.  SKIN: warm and dry  PSYCHIATRIC: Calm.  No agitation.  CNS: AAO. moves limbs, follows     LABS:      RADIOLOGY & ADDITIONAL TESTS:  Imaging from Last 24 Hours:    Electrocardiogram/QTc Interval:    COORDINATION OF CARE:  Care Discussed with Consultants/Other Providers:

## 2023-07-23 PROCEDURE — 99232 SBSQ HOSP IP/OBS MODERATE 35: CPT

## 2023-07-23 RX ORDER — MIDODRINE HYDROCHLORIDE 2.5 MG/1
5 TABLET ORAL
Refills: 0 | Status: DISCONTINUED | OUTPATIENT
Start: 2023-07-23 | End: 2023-08-01

## 2023-07-23 RX ADMIN — Medication 1 APPLICATION(S): at 17:54

## 2023-07-23 RX ADMIN — Medication 1 APPLICATION(S): at 05:04

## 2023-07-23 RX ADMIN — Medication 125 MICROGRAM(S): at 05:08

## 2023-07-23 RX ADMIN — LEVETIRACETAM 500 MILLIGRAM(S): 250 TABLET, FILM COATED ORAL at 05:06

## 2023-07-23 RX ADMIN — MIDODRINE HYDROCHLORIDE 2.5 MILLIGRAM(S): 2.5 TABLET ORAL at 05:08

## 2023-07-23 RX ADMIN — AMIODARONE HYDROCHLORIDE 200 MILLIGRAM(S): 400 TABLET ORAL at 05:05

## 2023-07-23 RX ADMIN — LEVETIRACETAM 500 MILLIGRAM(S): 250 TABLET, FILM COATED ORAL at 17:54

## 2023-07-23 RX ADMIN — BACITRACIN ZINC NEOMYCIN SULFATE POLYMYXIN B SULFATE 1 APPLICATION(S): 400; 3.5; 5 OINTMENT TOPICAL at 05:13

## 2023-07-23 RX ADMIN — Medication 9 MILLIGRAM(S): at 21:31

## 2023-07-23 RX ADMIN — ZINC OXIDE 1 APPLICATION(S): 200 OINTMENT TOPICAL at 05:03

## 2023-07-23 RX ADMIN — MIDODRINE HYDROCHLORIDE 5 MILLIGRAM(S): 2.5 TABLET ORAL at 14:01

## 2023-07-23 NOTE — PROGRESS NOTE ADULT - ASSESSMENT
Imp: Patient with diagnosis of          admitted for comprehensive acute rehabilitation.    Plan:  - Continue PT/OT/SLP  - DVT prophylaxis  - Skin- Turn q2h, check skin daily  - Continue current medications; patient medically stable.   -Active issues-   - Patient is stable to continue current rehabilitation program.   - discussed orthostatic vitals with medicine, increased midodrine to 5mg same schedule

## 2023-07-23 NOTE — PROGRESS NOTE ADULT - SUBJECTIVE AND OBJECTIVE BOX
Cc: Gait dysfunction    HPI: Patient with no new medical issues overnight.  Pain controlled, no chest pain, no N/V, no Fevers/Chills. No other new ROS  Has been tolerating rehabilitation program.    acetaminophen     Tablet .. 650 milliGRAM(s) Oral every 6 hours PRN  aMIOdarone    Tablet 200 milliGRAM(s) Oral daily  ammonium lactate 12% Lotion 1 Application(s) Topical two times a day  digoxin     Tablet 125 MICROGram(s) Oral daily  levETIRAcetam 500 milliGRAM(s) Oral two times a day  melatonin 9 milliGRAM(s) Oral at bedtime  midodrine. 5 milliGRAM(s) Oral <User Schedule>  neomycin/bacitracin/polymyxin Topical Ointment 1 Application(s) Topical two times a day  polyethylene glycol 3350 17 Gram(s) Oral daily PRN  senna 2 Tablet(s) Oral at bedtime  zinc oxide 40% Paste 1 Application(s) Topical two times a day      T(C): 36.6 (07-23-23 @ 08:26), Max: 36.7 (07-22-23 @ 20:15)  HR: 109 (07-23-23 @ 08:26) (102 - 109)  BP: 124/80 (07-23-23 @ 08:26) (124/80 - 131/85)  RR: 16 (07-23-23 @ 08:26) (16 - 16)  SpO2: 96% (07-23-23 @ 08:26) (96% - 97%)    In NAD  HEENT- EOMI  Heart- RRR, S1S2  Lungs- CTA bl.  Abd- + BS, NT  Ext- No calf pain  Neuro- Exam unchanged

## 2023-07-23 NOTE — PROGRESS NOTE ADULT - SUBJECTIVE AND OBJECTIVE BOX
Medicine Progress Note    Patient is a 83y old  Male who presents with a chief complaint of s/p subdural/subarachnoid hemorrhage (23 Jul 2023 11:08)      SUBJECTIVE / OVERNIGHT EVENTS:  No complaints.  daughter Daysi was asking about starting AC    ADDITIONAL REVIEW OF SYSTEMS:  denied fever/chills/CP/SOB/cough/palpitation/dizziness/abdominal pian/nausea/vomiting/diarrhoea/constipation/dysuria/leg or calf pain/headaches.all other ROS neg    MEDICATIONS  (STANDING):  aMIOdarone    Tablet 200 milliGRAM(s) Oral daily  ammonium lactate 12% Lotion 1 Application(s) Topical two times a day  digoxin     Tablet 125 MICROGram(s) Oral daily  levETIRAcetam 500 milliGRAM(s) Oral two times a day  melatonin 9 milliGRAM(s) Oral at bedtime  midodrine. 5 milliGRAM(s) Oral <User Schedule>  neomycin/bacitracin/polymyxin Topical Ointment 1 Application(s) Topical two times a day  senna 2 Tablet(s) Oral at bedtime  zinc oxide 40% Paste 1 Application(s) Topical two times a day    MEDICATIONS  (PRN):  acetaminophen     Tablet .. 650 milliGRAM(s) Oral every 6 hours PRN Mild Pain (1 - 3)  polyethylene glycol 3350 17 Gram(s) Oral daily PRN Constipation    CAPILLARY BLOOD GLUCOSE        I&O's Summary    22 Jul 2023 07:01  -  23 Jul 2023 07:00  --------------------------------------------------------  IN: 1240 mL / OUT: 1350 mL / NET: -110 mL        PHYSICAL EXAM:  Vital Signs Last 24 Hrs  T(C): 36.6 (23 Jul 2023 08:26), Max: 36.7 (22 Jul 2023 20:15)  T(F): 97.9 (23 Jul 2023 08:26), Max: 98.1 (22 Jul 2023 20:15)  HR: 109 (23 Jul 2023 08:26) (102 - 109)  BP: 124/80 (23 Jul 2023 08:26) (124/80 - 131/85)  BP(mean): --  RR: 16 (23 Jul 2023 08:26) (16 - 16)  SpO2: 96% (23 Jul 2023 08:26) (96% - 97%)    Parameters below as of 23 Jul 2023 08:26  Patient On (Oxygen Delivery Method): room air    GENERAL: Not in distress. Alert    HEENT: clear conjuctiva, MMM. no pallor or icterus  CARDIOVASCULAR: RRR S1, S2. No murmur/rubs/gallop  LUNGS: BLAE+, no rales, no wheezing, no rhonchi.    ABDOMEN: ND. Soft,  NT, no guarding / rebound / rigidity. BS normoactive  EXTREMITIES: no edema. no leg or calf TP.  SKIN: warm and dry  PSYCHIATRIC: Calm.  No agitation.  CNS: AAO. moves limbs, follows     LABS:                        RADIOLOGY & ADDITIONAL TESTS:  Imaging from Last 24 Hours:    Electrocardiogram/QTc Interval:    COORDINATION OF CARE:  Care Discussed with Consultants/Other Providers:

## 2023-07-23 NOTE — PROGRESS NOTE ADULT - ASSESSMENT
82 yo with PMH of A fib, HFrEF, HTN, and hx of AVR presented to [facility] on 7/6 after fall, found to have SDH/SAH. Hospital Course complicated by A fib RVR and COVID (+). Patient now admitted to [facility] for a multidisciplinary rehab program.     #SAH/SDH  - Continue Keppra 500 mg BID for seizure ppx. duration per neuro  - Eliquis (for AF) on hold  - Aspiration and fall precautions  - Rehab, pain and bowel regimen per primary team    #HFrEF  # chronic A fib  #HTN  # orthostatic hypotension  - EF 30%  - on amiodarone 200 mg qd, digoxin 125 mcg qd, Lopressor 25 mg BID, Farxiga 10 mg qd  - Metoprolol dose was changed from 50 to 25 mg BID on 7/14 for OH, was changed further to metoprolol ER 12.5 mg BID on 7/15. changed to Metoprolol ER 12,5 mg daily and place on h/s instead of am [ guideline directed therapy]. held meorpol  7/21.   - added midodrine 2.5 mg BID 7/21. increased to 5 mg BID today 7/23  - held Farxiga for soft BP   - not a candidate for MRA,ACE/ARBS/ARNI due to OH  - Caution with IVF  - Outpatient cardio follow up for consideration of Watchman and cardioversion - [facility] Dr. Braxton  - per rehab team: OP cardiologist wanted to resume AC if neuro clears. suggested to dc digoxin if AC is initiated. primary team to consult neurosurgery for clearance for AC      #CKD3  - Unknown baseline  - Avoid nephrotoxins  - Monitor    #BPH  - held Flomax 0.4 mg nightly 7/20 for OH. resume as able  - monitor UO    #COVID-19 Infection  - PCR positive 7/7, repeat on 7/11 negative  - Monitor     #DVT prophylaxis  - SCDs  - Dopplers on admission 6/13 negative for DVT    d/w rehab team at [facility]

## 2023-07-24 LAB
ANION GAP SERPL CALC-SCNC: 2 MMOL/L — LOW (ref 5–17)
BUN SERPL-MCNC: 30 MG/DL — HIGH (ref 7–23)
CALCIUM SERPL-MCNC: 9.4 MG/DL — SIGNIFICANT CHANGE UP (ref 8.4–10.5)
CHLORIDE SERPL-SCNC: 98 MMOL/L — SIGNIFICANT CHANGE UP (ref 96–108)
CO2 SERPL-SCNC: 31 MMOL/L — SIGNIFICANT CHANGE UP (ref 22–31)
CREAT SERPL-MCNC: 1.48 MG/DL — HIGH (ref 0.5–1.3)
EGFR: 47 ML/MIN/1.73M2 — LOW
GLUCOSE SERPL-MCNC: 98 MG/DL — SIGNIFICANT CHANGE UP (ref 70–99)
HCT VFR BLD CALC: 39.5 % — SIGNIFICANT CHANGE UP (ref 39–50)
HGB BLD-MCNC: 12.8 G/DL — LOW (ref 13–17)
MAGNESIUM SERPL-MCNC: 2 MG/DL — SIGNIFICANT CHANGE UP (ref 1.6–2.6)
MCHC RBC-ENTMCNC: 32.4 GM/DL — SIGNIFICANT CHANGE UP (ref 32–36)
MCHC RBC-ENTMCNC: 33.1 PG — SIGNIFICANT CHANGE UP (ref 27–34)
MCV RBC AUTO: 102.1 FL — HIGH (ref 80–100)
NRBC # BLD: 0 /100 WBCS — SIGNIFICANT CHANGE UP (ref 0–0)
PLATELET # BLD AUTO: 209 K/UL — SIGNIFICANT CHANGE UP (ref 150–400)
POTASSIUM SERPL-MCNC: 5.4 MMOL/L — HIGH (ref 3.5–5.3)
POTASSIUM SERPL-SCNC: 5.4 MMOL/L — HIGH (ref 3.5–5.3)
RBC # BLD: 3.87 M/UL — LOW (ref 4.2–5.8)
RBC # FLD: 13.7 % — SIGNIFICANT CHANGE UP (ref 10.3–14.5)
SODIUM SERPL-SCNC: 131 MMOL/L — LOW (ref 135–145)
WBC # BLD: 8.74 K/UL — SIGNIFICANT CHANGE UP (ref 3.8–10.5)
WBC # FLD AUTO: 8.74 K/UL — SIGNIFICANT CHANGE UP (ref 3.8–10.5)

## 2023-07-24 PROCEDURE — 99232 SBSQ HOSP IP/OBS MODERATE 35: CPT

## 2023-07-24 PROCEDURE — 93306 TTE W/DOPPLER COMPLETE: CPT | Mod: 26

## 2023-07-24 RX ORDER — METOPROLOL TARTRATE 50 MG
12.5 TABLET ORAL DAILY
Refills: 0 | Status: DISCONTINUED | OUTPATIENT
Start: 2023-07-24 | End: 2023-07-28

## 2023-07-24 RX ADMIN — Medication 9 MILLIGRAM(S): at 21:17

## 2023-07-24 RX ADMIN — AMIODARONE HYDROCHLORIDE 200 MILLIGRAM(S): 400 TABLET ORAL at 05:16

## 2023-07-24 RX ADMIN — Medication 12.5 MILLIGRAM(S): at 14:43

## 2023-07-24 RX ADMIN — MIDODRINE HYDROCHLORIDE 5 MILLIGRAM(S): 2.5 TABLET ORAL at 05:16

## 2023-07-24 RX ADMIN — MIDODRINE HYDROCHLORIDE 5 MILLIGRAM(S): 2.5 TABLET ORAL at 14:02

## 2023-07-24 RX ADMIN — Medication 125 MICROGRAM(S): at 05:17

## 2023-07-24 NOTE — PROGRESS NOTE ADULT - SUBJECTIVE AND OBJECTIVE BOX
CC: Traumatic subdural hemorrhage     Today's Subjective & Objective Findings  Patient seen and examined, at bed side.   Dizziness overall improving.  Last BM on 7/23, per chart review.   Discussed DC of Flomax per cardiology for OH.  Patient started on midodrine over the weekend for OH.   No other complaints at this time.     ROS  Denies headache, dizziness, visual changes, chest pain, SOB/JOLLY, abdominal pain, nausea, vomiting, diarrhea, dysuria, numbness or tingling  interval dizziness    Therapy-- Engaging, working on progressive ambulation with walker  Hypotensive in therapy as reported by Therapists      Vital Signs Last 24 Hrs  T(C): 36.7 (24 Jul 2023 09:01), Max: 37.1 (23 Jul 2023 20:32)  T(F): 98.1 (24 Jul 2023 09:01), Max: 98.7 (23 Jul 2023 20:32)  HR: 110 (24 Jul 2023 09:01) (95 - 112)  BP: 112/73 (24 Jul 2023 09:01) (112/73 - 133/73)  BP(mean): --  RR: 16 (24 Jul 2023 09:01) (16 - 16)  SpO2: 96% (24 Jul 2023 09:01) (96% - 98%)    Parameters below as of 24 Jul 2023 09:01  Patient On (Oxygen Delivery Method): room air      PHYSICAL EXAM:  Gen -  Comfortable, sitting in bed  HEENT - NAD  Neck - Supple,   Pulm - Clear  Cardiovascular - irregular , S1S2  Chest - vesicular breath sounds  Abdomen - Soft, non tender +BS  Extremities - No Cyanosis, no clubbing, no edema, no calf tenderness    Neuro-  AAO X 3,      Cranial Nerves - CN 2-12 intact     Motor - No focal deficits. 4+/5     Sensory - Intact  to LT      Reflexes -2+     Coordination -  no dysmetria     Tone - normal  MSK: Kyphosis  Psychiatric - Mood stable, Affect WNL  Skin:  skin tear  x 2 to right forearm,resolving  No edema    LABS:                        12.8   8.74  )-----------( 209      ( 24 Jul 2023 05:35 )             39.5     07-24    131<L>  |  98  |  30<H>  ----------------------------<  98  5.4<H>   |  31  |  1.48<H>    Ca    9.4      24 Jul 2023 05:35  Mg     2.0     07-24        Urinalysis Basic - ( 24 Jul 2023 05:35 )    Color: x / Appearance: x / SG: x / pH: x  Gluc: 98 mg/dL / Ketone: x  / Bili: x / Urobili: x   Blood: x / Protein: x / Nitrite: x   Leuk Esterase: x / RBC: x / WBC x   Sq Epi: x / Non Sq Epi: x / Bacteria: x      MEDICATIONS  (STANDING):  aMIOdarone    Tablet 200 milliGRAM(s) Oral daily  ammonium lactate 12% Lotion 1 Application(s) Topical two times a day  digoxin     Tablet 125 MICROGram(s) Oral daily  melatonin 9 milliGRAM(s) Oral at bedtime  midodrine. 5 milliGRAM(s) Oral <User Schedule>  neomycin/bacitracin/polymyxin Topical Ointment 1 Application(s) Topical two times a day  senna 2 Tablet(s) Oral at bedtime  zinc oxide 40% Paste 1 Application(s) Topical two times a day    MEDICATIONS  (PRN):  acetaminophen     Tablet .. 650 milliGRAM(s) Oral every 6 hours PRN Mild Pain (1 - 3)  polyethylene glycol 3350 17 Gram(s) Oral daily PRN Constipation   CC: Traumatic subdural hemorrhage     Today's Subjective & Objective Findings  Patient seen and examined, at bed side.   Dizziness overall improving.  Last BM on 7/23, per chart review.   Discussed DC of Flomax per cardiology for OH.  Patient started on midodrine over the weekend for Orthostatic hypotension  No other complaints at this time.     ROS  Denies headache, dizziness, visual changes, chest pain, SOB/JOLLY, abdominal pain, nausea, vomiting, diarrhea, dysuria, numbness or tingling  interval dizziness    Therapy-- Engaging, working on progressive ambulation with walker  Hypotensive in therapy as reported by Therapists      Vital Signs Last 24 Hrs  T(C): 36.7 (24 Jul 2023 09:01), Max: 37.1 (23 Jul 2023 20:32)  T(F): 98.1 (24 Jul 2023 09:01), Max: 98.7 (23 Jul 2023 20:32)  HR: 110 (24 Jul 2023 09:01) (95 - 112)  BP: 112/73 (24 Jul 2023 09:01) (112/73 - 133/73)  RR: 16 (24 Jul 2023 09:01) (16 - 16)  SpO2: 96% (24 Jul 2023 09:01) (96% - 98%)    Parameters below as of 24 Jul 2023 09:01  Patient On (Oxygen Delivery Method): room air      PHYSICAL EXAM:  Gen -  Comfortable,   HEENT - NAD  Neck - Supple,   Pulm - Clear  Cardiovascular - irregular , S1S2  Chest - vesicular breath sounds  Abdomen - Soft, non tender +BS  Extremities - No Cyanosis, no clubbing, no edema, no calf tenderness    Neuro-  AAO X 3,      Cranial Nerves - CN 2-12 intact     Motor - No focal deficits. 4+/5     Sensory - Intact  to LT      Reflexes -2+     Coordination -  no dysmetria     Tone - normal  MSK: Kyphosis  Psychiatric - Mood stable, Affect WNL  Skin:  skin tear right arm resolved    LABS:                        12.8   8.74  )-----------( 209      ( 24 Jul 2023 05:35 )             39.5     07-24    131<L>  |  98  |  30<H>  ----------------------------<  98  5.4<H>   |  31  |  1.48<H>    Ca    9.4      24 Jul 2023 05:35  Mg     2.0     07-24        Urinalysis Basic - ( 24 Jul 2023 05:35 )    Color: x / Appearance: x / SG: x / pH: x  Gluc: 98 mg/dL / Ketone: x  / Bili: x / Urobili: x   Blood: x / Protein: x / Nitrite: x   Leuk Esterase: x / RBC: x / WBC x   Sq Epi: x / Non Sq Epi: x / Bacteria: x      MEDICATIONS  (STANDING):  aMIOdarone    Tablet 200 milliGRAM(s) Oral daily  ammonium lactate 12% Lotion 1 Application(s) Topical two times a day  digoxin     Tablet 125 MICROGram(s) Oral daily  melatonin 9 milliGRAM(s) Oral at bedtime  midodrine. 5 milliGRAM(s) Oral <User Schedule>  neomycin/bacitracin/polymyxin Topical Ointment 1 Application(s) Topical two times a day  senna 2 Tablet(s) Oral at bedtime  zinc oxide 40% Paste 1 Application(s) Topical two times a day    MEDICATIONS  (PRN):  acetaminophen     Tablet .. 650 milliGRAM(s) Oral every 6 hours PRN Mild Pain (1 - 3)  polyethylene glycol 3350 17 Gram(s) Oral daily PRN Constipation

## 2023-07-24 NOTE — PROGRESS NOTE ADULT - SUBJECTIVE AND OBJECTIVE BOX
Medicine Progress Note    Patient is a 83y old  Male who presents with a chief complaint of s/p subdural/subarachnoid hemorrhage (24 Jul 2023 12:39)      SUBJECTIVE / OVERNIGHT EVENTS:  no complaints    ADDITIONAL REVIEW OF SYSTEMS:  denied fever/chills/CP/SOB/cough/palpitation/dizziness/abdominal pian/nausea/vomiting/diarrhoea/constipation/dysuria/leg or calf pain/headaches.all other ROS neg    MEDICATIONS  (STANDING):  aMIOdarone    Tablet 200 milliGRAM(s) Oral daily  ammonium lactate 12% Lotion 1 Application(s) Topical two times a day  digoxin     Tablet 125 MICROGram(s) Oral daily  melatonin 9 milliGRAM(s) Oral at bedtime  metoprolol succinate ER 12.5 milliGRAM(s) Oral daily  midodrine. 5 milliGRAM(s) Oral <User Schedule>  neomycin/bacitracin/polymyxin Topical Ointment 1 Application(s) Topical two times a day  senna 2 Tablet(s) Oral at bedtime  zinc oxide 40% Paste 1 Application(s) Topical two times a day    MEDICATIONS  (PRN):  acetaminophen     Tablet .. 650 milliGRAM(s) Oral every 6 hours PRN Mild Pain (1 - 3)  polyethylene glycol 3350 17 Gram(s) Oral daily PRN Constipation    CAPILLARY BLOOD GLUCOSE        I&O's Summary      PHYSICAL EXAM:  Vital Signs Last 24 Hrs  T(C): 36.7 (24 Jul 2023 09:01), Max: 37.1 (23 Jul 2023 20:32)  T(F): 98.1 (24 Jul 2023 09:01), Max: 98.7 (23 Jul 2023 20:32)  HR: 110 (24 Jul 2023 09:01) (95 - 112)  BP: 112/73 (24 Jul 2023 09:01) (112/73 - 133/73)  BP(mean): --  RR: 16 (24 Jul 2023 09:01) (16 - 16)  SpO2: 96% (24 Jul 2023 09:01) (96% - 98%)    Parameters below as of 24 Jul 2023 09:01  Patient On (Oxygen Delivery Method): room air    GENERAL: Not in distress. Alert    HEENT: clear conjuctiva, MMM. no pallor or icterus  CARDIOVASCULAR: irregular S1, S2. soft SM. no rubs/gallop  LUNGS: BLAE+, no rales, no wheezing, no rhonchi.    ABDOMEN: ND. Soft,  NT, no guarding / rebound / rigidity. BS normoactive  EXTREMITIES: no edema. no leg or calf TP.  SKIN: warm and dry  PSYCHIATRIC: Calm.  No agitation.  CNS: AAO. moves limbs, follows commands    LABS:                        12.8   8.74  )-----------( 209      ( 24 Jul 2023 05:35 )             39.5     07-24    131<L>  |  98  |  30<H>  ----------------------------<  98  5.4<H>   |  31  |  1.48<H>    Ca    9.4      24 Jul 2023 05:35  Mg     2.0     07-24            Urinalysis Basic - ( 24 Jul 2023 05:35 )    Color: x / Appearance: x / SG: x / pH: x  Gluc: 98 mg/dL / Ketone: x  / Bili: x / Urobili: x   Blood: x / Protein: x / Nitrite: x   Leuk Esterase: x / RBC: x / WBC x   Sq Epi: x / Non Sq Epi: x / Bacteria: x            RADIOLOGY & ADDITIONAL TESTS:  Imaging from Last 24 Hours:    Electrocardiogram/QTc Interval:    COORDINATION OF CARE:  Care Discussed with Consultants/Other Providers:

## 2023-07-24 NOTE — PROGRESS NOTE ADULT - ASSESSMENT
Assessment/Plan:  CHADD VALDIVIA is a 83y with PMH of A fib, HFrEF, HTN, and hx of AVR who presented to the [facility] on 7/6 after fall, found to have SDH/SAH.  Hospital Course complicated by A fib RVR and COVID (+). Patient now admitted for a multidisciplinary rehab program. 07-12-23 @ 15:20    * Midodrine continued  * Await return call from Neurology for AC and Keppra duration     SAH/SDH  (Left frontal SDH) --7/6  CTH 7/13--No interval change --Trace   subdural hemorrhage along the left side of the posterior falx measuring 2   to 3 mm.  - Continue Keppra 500mg BID for seizure ppx- last dose 7/23 PM   - Await return call from Neurology for AC and Keppra duration   - Aspiration and fall precautions  - Continue comprehensive rehab program of PT/OT/SLP - 3 hours a day, 5 days a week.   - Repeat CTH (7/20): Evolution of hemorrhagic contusion in the anterior inferior LEFT frontal lobe. Scant focus of hemorrhage in the dependent portion of the LEFT lateral ventricle. Resolution of LEFT parafalcine subdural hemorrhage Mild to moderate periventricular white matter ischemia is noted.    A fib  - Course c/b A fib with RVR  - Will require outpatient cardio f/u, patient good candidate for Watchman implant in future  ( Dr Jonas, cardiologist at [facility])   - Amiodarone  - Metoprolol DCd due to hypotension/OH   --7/20--D/w Dr Marquez, (patient's cardiologist)--recs to hold digoxin if apixiban is to be recommenced, and to c/w amiodarone and metoprolol  --Cardiology consulted to review cardiac meds in view of his dizziness - Flomax DCd 7/20      HFrEF  - EF 30%  - Continue amiodarone 200mg daily    COVID (+)  - PCR positive 7/7, repeat on 7/11 negative    HTN   (recent hypotension)   - Continue lopressor---dose reduced to 12.5mg daily 7/19    Pain  - continue Tylenol     Sleep--hcornic insomina  - Melatonin  9 mg qhs  (fall while on ambien)    GI / Bowel  - Senna qHS  - Miralax PRN Daily  - GI ppx: Protonix    /Bladder  BPH/Urinary retention  - Flomax DCd 7/20 due to possible OH contribution  --F/u with his urologist    Skin / Pressure injury  --Skin tear x 2 areas right forearm. continue  topical antibiotic cream (neomycin) and  protective dressing   - soft heel protectors    Diet/Dysphagia:  - Diet Consistency: Regular  - Aspiration Precautions  - on SLP f/u  - Nutrition f/u ongoing    DVT prophylaxis:   - SCD    # Health maintenance  - Vitamin B12 and Folate labs - normal       Full code  Living alone since death of wife 6yr ago  Independent with ADLs prior to admission  --------------------------------------------  7/16--Lab mild hyponatremia, Normal Hb and stable CKD    7/16--L;iaison with family--Ms Tavarez (daughter)   Daughter at bed side--gave collateral hx;  Patient on f/u with Dr Oliver at [facility] service, being considered by ablation treatment or Watchman device for his A fib (alternative treatment due to hx of falls) prior to his fall and hosp admission with SDH  Had been independently functioning at baseline  I gave daughter details of patient's function, clinical progress including management of hypotension by reduction of metoprolol dose  I told her that we will be liaising with patient's cardiologist for post discharge plan    7/19--We discussed update of Ms Pimentel, patient's family, functional update and discharge plan. She was happy with the plan  7/20--Discussed with Mr Tavarez (daughter).-she shared patient's last hosp dc summary from [facility]--patient was on flomax,  digoxin. amiodarone, metorpolol faxiga, and other meds prior to rehab admission  He had been on apixiban prior to his fall on 7/16 and cardiology was plannig an ablation treatment vs watchman's device prior to his fall  Flomax was commenced post fall/SDH for urinary retention. She reports that patient's cardiologist had recommended to recommence AC  I called his cardiologist Dr Mcfarland today, and got upate  He did not recommend staring AC, rather, he recommended a neurology consult to get clearance for this     7/20  Liaison with providers. I called on ph and spoke with Dr Mcfarland, patient's cardiologist--he gave background of patient's cardiac hx--Afib, awaiting ablation vs watchman's device implant prior to his fall  He recommended neurology consult for clearance prior to recommencing AC. He also suggested stopping digoxin AC is to be recommenced     --------------------------------------------  IDT conference on 7/19  TDD: 7/26 to home  Barriers: Forgetful.  Social Work: Lives alone in  with 5-6 ISABEL with 1 HR and 14 STI. Has chair lift. Gets HHA 2-3 hrs 2x per week. Daughter lives close by.  OT: Setup for eating/grooming. CGA for all remaining ADLs/transfers.   PT: CGA for transfers. Ambulated 100 ft with RW with CGA. Negotiated 8 stairs with 2 HR with min A.   SLP: Regular with TLs. Mild cognitive deficits in short term memory. Needs cueing.  Will need family training.   --------------------------------------------  Outpatient Follow-up:  Specialty and Name of physician  Nephrology  Anshu Martino MD  [address]  [facility] [id number] [phone]    [facility]  [address], [address]  [place], [place] [id number] [phone]    Electrophysiology  Jason Ortega MD  [address] [place] [address] [phone]    Eliseo Braxton MD  [address] [facility] [id number] [phone]    Code Status/Emergency Contact:     Assessment/Plan:  CHADD VALDIVIA is a 83y with PMH of A fib, HFrEF, HTN, and hx of AVR who presented to the [facility] on 7/6 after fall, found to have SDH/SAH.  Hospital Course complicated by A fib RVR and COVID (+). Patient now admitted for a multidisciplinary rehab program. 07-12-23 @ 15:20    * Midodrine continued  * Await return call from Neurology for AC and Keppra duration  * Started on Metoprolol for tachycardia  * Echocardiogram ordered      SAH/SDH  (Left frontal SDH) --7/6  CTH 7/13--No interval change --Trace   subdural hemorrhage along the left side of the posterior falx measuring 2   to 3 mm.  - Continue Keppra 500mg BID for seizure ppx- last dose 7/23 PM   - Await return call from Neurology for AC and Keppra duration   - Aspiration and fall precautions  - Continue comprehensive rehab program of PT/OT/SLP - 3 hours a day, 5 days a week.   - Repeat CTH (7/20): Evolution of hemorrhagic contusion in the anterior inferior LEFT frontal lobe. Scant focus of hemorrhage in the dependent portion of the LEFT lateral ventricle. Resolution of LEFT parafalcine subdural hemorrhage Mild to moderate periventricular white matter ischemia is noted.    A fib  - Course c/b A fib with RVR  - Will require outpatient cardio f/u, patient good candidate for Watchman implant in future  ( Dr Jonas, cardiologist at [facility])   - Amiodarone  - Metoprolol ordered for tachycardia     --7/20--D/w Dr Marquez, (patient's cardiologist)--recs to hold digoxin if apixiban is to be recommenced, and to c/w amiodarone and metoprolol  --Cardiology consulted to review cardiac meds in view of his dizziness - Flomax DCd 7/20      HFrEF  - EF 30%  - Continue amiodarone 200mg daily    COVID (+)  - PCR positive 7/7, repeat on 7/11 negative    HTN   (recent hypotension)   - Continue lopressor---dose reduced to 12.5mg daily 7/19    Pain  - continue Tylenol     Sleep--hcornic insomina  - Melatonin  9 mg qhs  (fall while on ambien)    GI / Bowel  - Senna qHS  - Miralax PRN Daily  - GI ppx: Protonix    /Bladder  BPH/Urinary retention  - Flomax DCd 7/20 due to possible [place] contribution  --F/u with his urologist    Skin / Pressure injury  --Skin tear x 2 areas right forearm. continue  topical antibiotic cream (neomycin) and  protective dressing   - soft heel protectors    Diet/Dysphagia:  - Diet Consistency: Regular  - Aspiration Precautions  - on SLP f/u  - Nutrition f/u ongoing    DVT prophylaxis:   - SCD    # Health maintenance  - Vitamin B12 and Folate labs - normal       Full code  Living alone since death of wife 6yr ago  Independent with ADLs prior to admission  --------------------------------------------  7/16--Lab mild hyponatremia, Normal Hb and stable CKD    7/16--L;iaison with family--Ms Tavarez (daughter)   Daughter at bed side--gave collateral hx;  Patient on f/u with Dr Oliver at [facility] service, being considered by ablation treatment or Watchman device for his A fib (alternative treatment due to hx of falls) prior to his fall and hosp admission with SDH  Had been independently functioning at baseline  I gave daughter details of patient's function, clinical progress including management of hypotension by reduction of metoprolol dose  I told her that we will be liaising with patient's cardiologist for post discharge plan    7/19--We discussed update of Ms Pimentel, patient's family, functional update and discharge plan. She was happy with the plan  7/20--Discussed with Mr Tavarez (daughter).-she shared patient's last hosp dc summary from [facility]--patient was on flomax,  digoxin. amiodarone, metorpolol faxiga, and other meds prior to rehab admission  He had been on apixiban prior to his fall on 7/16 and cardiology was plannig an ablation treatment vs watchman's device prior to his fall  Flomax was commenced post fall/SDH for urinary retention. She reports that patient's cardiologist had recommended to recommence AC  I called his cardiologist Dr Mcfarland today, and got upate  He did not recommend staring AC, rather, he recommended a neurology consult to get clearance for this     7/20  Liaison with providers. I called on  and spoke with Dr Mcfarland, patient's cardiologist--he gave background of patient's cardiac hx--Afib, awaiting ablation vs watchman's device implant prior to his fall  He recommended neurology consult for clearance prior to recommencing AC. He also suggested stopping digoxin AC is to be recommenced     --------------------------------------------  IDT conference on 7/19  TDD: 7/26 to home  Barriers: Forgetful.  Social Work: Lives alone in  with 5-6 ISABEL with 1 HR and 14 STI. Has chair lift. Gets HHA 2-3 hrs 2x per week. Daughter lives close by.  OT: Setup for eating/grooming. CGA for all remaining ADLs/transfers.   PT: CGA for transfers. Ambulated 100 ft with RW with CGA. Negotiated 8 stairs with 2 HR with min A.   SLP: Regular with TLs. Mild cognitive deficits in short term memory. Needs cueing.  Will need family training.   --------------------------------------------  Outpatient Follow-up:  Specialty and Name of physician  Nephrology  Anshu Martino MD  [address] [place] [address], [address]  [facility] [id number] [phone]    [facility]  [address], [address]  [place] [address] [phone]    Electrophysiology  Jason Ortega MD  [address] [place] [address] [phone]    Eliseo Braxton MD  [address] [facility] [address] [phone]    Code Status/Emergency Contact:     Assessment/Plan:  CHADD VALDIVIA is a 83y with PMH of A fib, HFrEF, HTN, and hx of AVR who presented to the [facility] on 7/6 after fall, found to have SDH/SAH.  Hospital Course complicated by A fib RVR and COVID (+). Patient now admitted for a multidisciplinary rehab program. 07-12-23 @ 15:20    * Midodrine continued  * Await return call from Neurology for AC and Keppra duration  * Started on Metoprolol for tachycardia  * Echocardiogram ordered      SAH/SDH  (Left frontal SDH) --7/6  CTH 7/13--No interval change --Trace   subdural hemorrhage along the left side of the posterior falx measuring 2   to 3 mm.  - Continue Keppra 500mg BID for seizure ppx  - Await return call from Neurology for AC and Keppra duration   - Aspiration and fall precautions  - Continue comprehensive rehab program of PT/OT/SLP - 3 hours a day, 5 days a week.   - Repeat CTH (7/20): Evolution of hemorrhagic contusion in the anterior inferior LEFT frontal lobe. Scant focus of hemorrhage in the dependent portion of the LEFT lateral ventricle. Resolution of LEFT parafalcine subdural hemorrhage Mild to moderate periventricular white matter ischemia is noted.    A fib  - Course c/b A fib with RVR  - Will require outpatient cardio f/u, patient good candidate for Watchman implant in future  ( Dr Jonas, cardiologist at [facility])   - Amiodarone  - Metoprolol ordered for tachycardia     --7/20--D/w Dr Marquez, (patient's cardiologist)--recs to hold digoxin if apixiban is to be recommenced, and to c/w amiodarone and metoprolol  --Cardiology consulted to review cardiac meds in view of his dizziness - Flomax DCd 7/20      HFrEF  - EF 30%  - Continue amiodarone 200mg daily    COVID (+)  - PCR positive 7/7, repeat on 7/11 negative    HTN   (recent hypotension)   - Continue lopressor---dose reduced to 12.5mg daily 7/19    Pain  - continue Tylenol     Sleep--hcornic insomina  - Melatonin  9 mg qhs  (fall while on ambien)    GI / Bowel  - Senna qHS  - Miralax PRN Daily  - GI ppx: Protonix    /Bladder  BPH/Urinary retention  - Flomax DCd 7/20 due to possible [place] contribution  --F/u with his urologist    Skin / Pressure injury  --Skin tear x 2 areas right forearm. continue  topical antibiotic cream (neomycin) and  protective dressing   - soft heel protectors    Diet/Dysphagia:  - Diet Consistency: Regular  - Aspiration Precautions  - on SLP f/u  - Nutrition f/u ongoing    DVT prophylaxis:   - SCD    # Health maintenance  - Vitamin B12 and Folate labs - normal       Full code  Living alone since death of wife 6yr ago  Independent with ADLs prior to admission  --------------------------------------------  7/16--Lab mild hyponatremia, Normal Hb and stable CKD    7/16--L;iaison with family--Ms Tavarez (daughter)   Daughter at bed side--gave collateral hx;  Patient on f/u with Dr Oliver at [facility] service, being considered by ablation treatment or Watchman device for his A fib (alternative treatment due to hx of falls) prior to his fall and hosp admission with SDH  Had been independently functioning at baseline  I gave daughter details of patient's function, clinical progress including management of hypotension by reduction of metoprolol dose  I told her that we will be liaising with patient's cardiologist for post discharge plan    7/19--We discussed update of Ms Pimentel, patient's family, functional update and discharge plan. She was happy with the plan  7/20--Discussed with Mr Tavarez (daughter).-she shared patient's last hosp dc summary from [facility]--patient was on flomax,  digoxin. amiodarone, metorpolol faxiga, and other meds prior to rehab admission  He had been on apixiban prior to his fall on 7/16 and cardiology was plannig an ablation treatment vs watchman's device prior to his fall  Flomax was commenced post fall/SDH for urinary retention. She reports that patient's cardiologist had recommended to recommence AC  I called his cardiologist Dr Mcfarland today, and got upate  He did not recommend staring AC, rather, he recommended a neurology consult to get clearance for this     7/20  Liaison with providers. I called on ph and spoke with Dr Mcfarland, patient's cardiologist--he gave background of patient's cardiac hx--Afib, awaiting ablation vs watchman's device implant prior to his fall  He recommended neurology consult for clearance prior to recommencing AC. He also suggested stopping digoxin AC is to be recommenced     --------------------------------------------  IDT conference on 7/19  TDD: 7/26 to home  Barriers: Forgetful.  Social Work: Lives alone in  with 5-6 ISABEL with 1 HR and 14 STI. Has chair lift. Gets HHA 2-3 hrs 2x per week. Daughter lives close by.  OT: Setup for eating/grooming. CGA for all remaining ADLs/transfers.   PT: CGA for transfers. Ambulated 100 ft with RW with CGA. Negotiated 8 stairs with 2 HR with min A.   SLP: Regular with TLs. Mild cognitive deficits in short term memory. Needs cueing.  Will need family training.   --------------------------------------------  Outpatient Follow-up:  Specialty and Name of physician  Nephrology  Anshu Martino MD  [address]  [facility] [id number] [phone]    [facility]  [address], [address]  [place], [place] [id number] [phone]    Electrophysiology  Jason Ortega MD  [address] [place] [address] [phone]    Eliseo Braxton MD  [address] [facility] [id number] [phone]    Code Status/Emergency Contact:     Assessment/Plan:  CHADD VALDIVIA is a 83y with PMH of A fib, HFrEF, HTN, and hx of AVR who presented to the [facility] on 7/6 after fall, found to have SDH/SAH.  Hospital Course complicated by A fib RVR and COVID (+). Patient now admitted for a multidisciplinary rehab program. 07-12-23 @ 15:20    * Midodrine 5 BID   * Await return call from Neurology for AC  * Started on Metoprolol ER 12.5   * Echocardiogram ordered      SAH/SDH  (Left frontal SDH) --7/6  CTH 7/13--No interval change --Trace   subdural hemorrhage along the left side of the posterior falx measuring 2   to 3 mm.  - Continue Keppra 500mg BID for seizure ppx  --- Keppra completed on 7/23 per [place] chart review  - Await return call from Neurology for AC  - Aspiration and fall precautions  - Continue comprehensive rehab program of PT/OT/SLP - 3 hours a day, 5 days a week.   - Repeat CTH (7/20): Evolution of hemorrhagic contusion in the anterior inferior LEFT frontal lobe. Scant focus of hemorrhage in the dependent portion of the LEFT lateral ventricle. Resolution of LEFT parafalcine subdural hemorrhage Mild to moderate periventricular white matter ischemia is noted.    A fib  - Course c/b A fib with RVR  - Will require outpatient cardio f/u, patient good candidate for Watchman implant in future  ( Dr Jonas, cardiologist at [facility])   - Amiodarone  - Metoprolol ER 12.5 daily     --7/20--D/w Dr Marquez, (patient's cardiologist)--recs to hold digoxin if apixiban is to be recommenced, and to c/w amiodarone and metoprolol  --Cardiology consulted to review cardiac meds in view of his dizziness - Flomax DCd 7/20      HFrEF  - EF 30%  - Continue amiodarone 200mg daily  - ECHO ordered     COVID (+)  - PCR positive 7/7, repeat on 7/11 negative    HTN   (recent hypotension)   - Continue lopressor---dose reduced to 12.5mg daily 7/19    Pain  - continue Tylenol     Sleep--hcornic insomina  - Melatonin  9 mg qhs  (fall while on ambien)    GI / Bowel  - Senna qHS  - Miralax PRN Daily  - GI ppx: Protonix    /Bladder  BPH/Urinary retention  - Flomax DCd 7/20 due to possible [place] contribution  --F/u with his urologist    Skin / Pressure injury  --Skin tear x 2 areas right forearm. continue  topical antibiotic cream (neomycin) and  protective dressing   - soft heel protectors    Diet/Dysphagia:  - Diet Consistency: Regular  - Aspiration Precautions  - on SLP f/u  - Nutrition f/u ongoing    DVT prophylaxis:   - SCD    # Health maintenance  - Vitamin B12 and Folate labs - normal       Full code  Living alone since death of wife 6yr ago  Independent with ADLs prior to admission  --------------------------------------------  7/16--Lab mild hyponatremia, Normal Hb and stable CKD    7/16--L;iaison with family--Ms Tavarez (daughter)   Daughter at bed side--gave collateral hx;  Patient on f/u with Dr Oliver at [facility] service, being considered by ablation treatment or Watchman device for his A fib (alternative treatment due to hx of falls) prior to his fall and hosp admission with SDH  Had been independently functioning at baseline  I gave daughter details of patient's function, clinical progress including management of hypotension by reduction of metoprolol dose  I told her that we will be liaising with patient's cardiologist for post discharge plan    7/19--We discussed update of Ms Pimentel, patient's family, functional update and discharge plan. She was happy with the plan  7/20--Discussed with Mr Tavarez (daughter).-she shared patient's last hosp dc summary from [facility]--patient was on flomax,  digoxin. amiodarone, metorpolol faxiga, and other meds prior to rehab admission  He had been on apixiban prior to his fall on 7/16 and cardiology was plannig an ablation treatment vs watchman's device prior to his fall  Flomax was commenced post fall/SDH for urinary retention. She reports that patient's cardiologist had recommended to recommence AC  I called his cardiologist Dr Mcfarland today, and got upate  He did not recommend staring AC, rather, he recommended a neurology consult to get clearance for this     7/20  Liaison with providers. I called on  and spoke with Dr Mcfarland, patient's cardiologist--he gave background of patient's cardiac hx--Afib, awaiting ablation vs watchman's device implant prior to his fall  He recommended neurology consult for clearance prior to recommencing AC. He also suggested stopping digoxin AC is to be recommenced     --------------------------------------------  IDT conference on 7/19  TDD: 7/26 to home  Barriers: Forgetful.  Social Work: Lives alone in  with 5-6 ISABEL with 1 HR and 14 STI. Has chair lift. Gets HHA 2-3 hrs 2x per week. Daughter lives close by.  OT: Setup for eating/grooming. CGA for all remaining ADLs/transfers.   PT: CGA for transfers. Ambulated 100 ft with RW with CGA. Negotiated 8 stairs with 2 HR with min A.   SLP: Regular with TLs. Mild cognitive deficits in short term memory. Needs cueing.  Will need family training.   --------------------------------------------  Outpatient Follow-up:  Specialty and Name of physician  Nephrology  Anshu Martino MD  [address] [place] [address], [address]  [facility] [id number] [phone]    [facility]  [address], [address]  [place] [address] [phone]    Electrophysiology  Jason Ortega MD  [address] [place] [address] [phone]    Eliseo Braxton MD  [address] [facility] [address] [phone]    Code Status/Emergency Contact:     Assessment/Plan:  CHADD VALDIVIA is a 83y with PMH of A fib, HFrEF, HTN, and hx of AVR who presented to the [facility] on 7/6 after fall, found to have SDH/SAH.  Hospital Course complicated by A fib RVR and COVID (+). Patient now admitted for a multidisciplinary rehab program. 07-12-23 @ 15:20    * Midodrine 5 BID   * Await return call from Neurolog, on when to recommence AC  * Started on Metoprolol ER 12.5   * Echocardiogram ordered        SAH/SDH  (Left frontal SDH) --7/6  CTH 7/13--No interval change --Trace   subdural hemorrhage along the left side of the posterior falx measuring 2   to 3 mm.  --- Keppra completed on 7/23 per [place] chart review  -- Await return call from Neurolog, on when to recommence AC  - Aspiration and fall precautions  - Continue comprehensive rehab program of PT/OT/SLP - 3 hours a day, 5 days a week.   - Repeat CTH (7/20): Evolution of hemorrhagic contusion in the anterior inferior LEFT frontal lobe. Scant focus of hemorrhage in the dependent portion of the LEFT lateral ventricle. Resolution of LEFT parafalcine subdural hemorrhage Mild to moderate periventricular white matter ischemia is noted.    A fib  - Course c/b A fib with RVR  - Will require outpatient cardio f/u, patient good candidate for Watchman implant in future  ( Dr Jonas, cardiologist at [facility])   - Amiodarone  - Recommenced Metoprolol ER 12.5 daily     --7/20--D/w Dr Marquez, (patient's cardiologist)--recs to hold digoxin if apixiban is to be recommenced, and to c/w amiodarone and metoprolol  --Cardiology consulted to review cardiac meds in view of his dizziness - Flomax DCd 7/20      HFrEF  - EF 30%  - Continue amiodarone 200mg daily  - ECHO ordered     COVID (+)  - PCR positive 7/7, repeat on 7/11 negative    HTN   (recent hypotension)   - Continue lopressor---dose reduced to 12.5mg daily 7/19    Pain  - continue Tylenol     Sleep--hcornic insomina  - Melatonin  9 mg qhs  (fall while on ambien)    GI / Bowel  - Senna qHS  - Miralax PRN Daily  - GI ppx: Protonix    /Bladder  BPH/Urinary retention  - Flomax DCd 7/20 due to possible OH contribution  --F/u with his urologist    Skin / Pressure injury  --Skin tear x 2 areas right forearm. continue  topical antibiotic cream (neomycin) and  protective dressing   - soft heel protectors    Diet/Dysphagia:  - Diet Consistency: Regular  - Aspiration Precautions  - on SLP f/u  - Nutrition f/u ongoing    DVT prophylaxis:   - SCD    # Health maintenance  - Vitamin B12 and Folate labs - normal       Full code  Living alone since death of wife 6yr ago  Independent with ADLs prior to admission  --------------------------------------------  7/16--Lab mild hyponatremia, Normal Hb and stable CKD    7/16--L;iaison with family--Ms Tavarez (daughter)   Daughter at bed side--gave collateral hx;  Patient on f/u with Dr Oliver at [facility] service, being considered by ablation treatment or Watchman device for his A fib (alternative treatment due to hx of falls) prior to his fall and hosp admission with SDH  Had been independently functioning at baseline  I gave daughter details of patient's function, clinical progress including management of hypotension by reduction of metoprolol dose  I told her that we will be liaising with patient's cardiologist for post discharge plan    7/19--We discussed update of Ms Pimentel, patient's family, functional update and discharge plan. She was happy with the plan  7/20--Discussed with Mr Tavarez (daughter).-she shared patient's last hosp dc summary from [facility]--patient was on flomax,  digoxin. amiodarone, metorpolol faxiga, and other meds prior to rehab admission  He had been on apixiban prior to his fall on 7/16 and cardiology was plannig an ablation treatment vs watchman's device prior to his fall  Flomax was commenced post fall/SDH for urinary retention. She reports that patient's cardiologist had recommended to recommence AC  I called his cardiologist Dr Mcfarland today, and got upate  He did not recommend staring AC, rather, he recommended a neurology consult to get clearance for this     7/20  Liaison with providers. I called on  and spoke with Dr Mcfarland, patient's cardiologist--he gave background of patient's cardiac hx--Afib, awaiting ablation vs watchman's device implant prior to his fall  He recommended neurology consult for clearance prior to recommencing AC. He also suggested stopping digoxin AC is to be recommenced     --------------------------------------------  IDT conference on 7/19  TDD: 7/26 to home  Barriers: Forgetful.  Social Work: Lives alone in  with 5-6 ISABEL with 1 HR and 14 STI. Has chair lift. Gets HHA 2-3 hrs 2x per week. Daughter lives close by.  OT: Setup for eating/grooming. CGA for all remaining ADLs/transfers.   PT: CGA for transfers. Ambulated 100 ft with RW with CGA. Negotiated 8 stairs with 2 HR with min A.   SLP: Regular with TLs. Mild cognitive deficits in short term memory. Needs cueing.  Will need family training.   --------------------------------------------  Outpatient Follow-up:  Specialty and Name of physician  Nephrology  Anshu Martino MD  [address] [place] [address], [address]  [facility] [address]  [phone]    [facility]  [address], [address]  [place] [address] [phone]    Electrophysiology  Jason Ortega MD  [address] [place] [address] [phone]    Eliseo Braxton MD  [address], [place]. \[facility].\"" [address] [phone]    Code Status/Emergency Contact:

## 2023-07-24 NOTE — PROGRESS NOTE ADULT - ASSESSMENT
84 yo with PMH of A fib, HFrEF, HTN, and hx of AVR presented to [facility] on 7/6 after fall, found to have SDH/SAH. Hospital Course complicated by A fib RVR and COVID (+). Patient now admitted to [facility] for a multidisciplinary rehab program.     #SAH/SDH  - Continue Keppra 500 mg BID for seizure ppx. duration per neuro  - Eliquis (for AF) on hold  - Aspiration and fall precautions  - Rehab, pain and bowel regimen per primary team    #HFrEF  # chronic A fib  #HTN  # orthostatic hypotension  - EF 30%  - on amiodarone 200 mg qd, digoxin 125 mcg qd, Lopressor 25 mg BID, Farxiga 10 mg qd  - Metoprolol dose was changed from 50 to 25 mg BID on 7/14 for OH, was changed further to metoprolol ER 12.5 mg BID on 7/15. changed to Metoprolol ER 12,5 mg daily and place on h/s instead of am [ guideline directed therapy]. held meorpol  7/21 for OH. resumed today 7/24 with Midodrine support for RVR  - Continue Midodrine 5 mg BID  - held Farxiga for soft BP   - not a candidate for MRA,ACE/ARBS/ARNI due to OH  - Caution with IVF  - educated about postural hypotension and fall prevention  - Outpatient cardio follow up for consideration of Watchman and cardioversion - [facility] Dr. Braxton  - per rehab team: OP cardiologist wanted to resume AC if neuro clears. suggested to dc digoxin if AC is initiated. primary team is awaiting neurosurgery for clearance for AC      #CKD3  - Unknown baseline  - Avoid nephrotoxins  - Monitor    #BPH  - held Flomax 0.4 mg nightly 7/20 for OH. resume as able  - monitor UO    #COVID-19 Infection  - PCR positive 7/7, repeat on 7/11 negative  - Monitor     #DVT prophylaxis  - SCDs  - Dopplers on admission 6/13 negative for DVT    d/w rehab team at [facility]

## 2023-07-25 ENCOUNTER — TRANSCRIPTION ENCOUNTER (OUTPATIENT)
Age: 83
End: 2023-07-25

## 2023-07-25 PROCEDURE — 99232 SBSQ HOSP IP/OBS MODERATE 35: CPT

## 2023-07-25 RX ORDER — APIXABAN 2.5 MG/1
5 TABLET, FILM COATED ORAL
Refills: 0 | Status: DISCONTINUED | OUTPATIENT
Start: 2023-07-28 | End: 2023-08-09

## 2023-07-25 RX ADMIN — Medication 1 APPLICATION(S): at 17:28

## 2023-07-25 RX ADMIN — AMIODARONE HYDROCHLORIDE 200 MILLIGRAM(S): 400 TABLET ORAL at 06:38

## 2023-07-25 RX ADMIN — MIDODRINE HYDROCHLORIDE 5 MILLIGRAM(S): 2.5 TABLET ORAL at 06:38

## 2023-07-25 RX ADMIN — MIDODRINE HYDROCHLORIDE 5 MILLIGRAM(S): 2.5 TABLET ORAL at 12:06

## 2023-07-25 RX ADMIN — Medication 9 MILLIGRAM(S): at 21:30

## 2023-07-25 RX ADMIN — ZINC OXIDE 1 APPLICATION(S): 200 OINTMENT TOPICAL at 17:28

## 2023-07-25 RX ADMIN — Medication 12.5 MILLIGRAM(S): at 06:38

## 2023-07-25 RX ADMIN — Medication 125 MICROGRAM(S): at 06:38

## 2023-07-25 NOTE — DISCHARGE NOTE NURSING/CASE MANAGEMENT/SOCIAL WORK - NSDCFUADDAPPT_GEN_ALL_CORE_FT
Please follow up with the following physicians:    Specialty and Name of physician  Nephrology  Anshu Martino MD  [address] , [address]  [facility] [address]  [phone]    [facility]  [address], [facility] [address]  [place] [address] [phone]    Electrophysiology  Jason Ortega MD  [address] [place] [address] [phone]    Eliseo Braxton MD  [address] [facility] [address] [phone]    Please follow up with your PCP as soon as possible.  MD: Dr. Gunner Siegel  Phone:   Address: [facility]  [address], [place] [address]    If you are in need of a PCP or a specialist in your area: contact the [facility] Physician Referral Service at [phone].

## 2023-07-25 NOTE — DISCHARGE NOTE NURSING/CASE MANAGEMENT/SOCIAL WORK - NSSCTYPOFSERV_GEN_ALL_CORE
You will be evaluated for [facility] services. You will be evaluated for HA services. SOC was accepted for 8/4/23. You will be evaluated for HA services. SOC was accepted for 8/5/23. You will be evaluated for HA services. SOC was accepted for 8/10/23.

## 2023-07-25 NOTE — PROGRESS NOTE ADULT - ASSESSMENT
Assessment/Plan:  CHADD VALDIVIA is a 83y with PMH of A fib, HFrEF, HTN, and hx of AVR who presented to the [facility] on 7/6 after fall, found to have SDH/SAH.  Hospital Course complicated by A fib RVR and COVID (+). Patient now admitted for a multidisciplinary rehab program. 07-12-23 @ 15:20    * Await return call from Neurology on when to recommence AC   * Echocardiogram EF 55-60%      SAH/SDH  (Left frontal SDH) --7/6  CTH 7/13--No interval change --Trace   subdural hemorrhage along the left side of the posterior falx measuring 2   to 3 mm.  --- Keppra completed on 7/23 per [place] chart review  -- Await return call from Neurology on when to recommence AC  - Aspiration and fall precautions  - Continue comprehensive rehab program of PT/OT/SLP - 3 hours a day, 5 days a week.   - Repeat CTH (7/20): Evolution of hemorrhagic contusion in the anterior inferior LEFT frontal lobe. Scant focus of hemorrhage in the dependent portion of the LEFT lateral ventricle. Resolution of LEFT parafalcine subdural hemorrhage Mild to moderate periventricular white matter ischemia is noted.    A fib  - Course c/b A fib with RVR  - Will require outpatient cardio f/u, patient good candidate for Watchman implant in future  ( Dr Jonas, cardiologist at [facility])   - Amiodarone  - Recommenced Metoprolol ER 12.5 daily     --7/20--D/w Dr Marquez, (patient's cardiologist)--recs to hold digoxin if apixiban is to be recommenced, and to c/w amiodarone and metoprolol  --Cardiology consulted to review cardiac meds in view of his dizziness - Flomax DCd 7/20      HFrEF  - EF 30%  - Continue amiodarone 200mg daily  - ECHO results - EF 55-60%    COVID (+)  - PCR positive 7/7, repeat on 7/11 negative    HTN   (recent hypotension) continue midodrine 5 BID  - Continue lopressor---dose reduced to 12.5mg daily 7/19    Pain  - continue Tylenol     Sleep--hcornic insomina  - Melatonin  9 mg qhs  (fall while on ambien)    GI / Bowel  - Senna qHS  - Miralax PRN Daily  - GI ppx: Protonix    /Bladder  BPH/Urinary retention  - Flomax DCd 7/20 due to possible [place] contribution  --F/u with his urologist    Skin / Pressure injury  --Skin tear x 2 areas right forearm. continue  topical antibiotic cream (neomycin) and  protective dressing   - soft heel protectors    Diet/Dysphagia:  - Diet Consistency: Regular  - Aspiration Precautions  - on SLP f/u  - Nutrition f/u ongoing    DVT prophylaxis:   - SCD    # Health maintenance  - Vitamin B12 and Folate labs - normal       Full code  Living alone since death of wife 6yr ago  Independent with ADLs prior to admission  --------------------------------------------  7/16--Lab mild hyponatremia, Normal Hb and stable CKD    7/16--L;iaison with family--Ms Tavarez (daughter)   Daughter at bed side--gave collateral hx;  Patient on f/u with Dr Oliver at [facility] service, being considered by ablation treatment or Watchman device for his A fib (alternative treatment due to hx of falls) prior to his fall and hosp admission with SDH  Had been independently functioning at baseline  I gave daughter details of patient's function, clinical progress including management of hypotension by reduction of metoprolol dose  I told her that we will be liaising with patient's cardiologist for post discharge plan    7/19--We discussed update of Ms Pimentel, patient's family, functional update and discharge plan. She was happy with the plan  7/20--Discussed with Mr Tavarez (daughter).-she shared patient's last hosp dc summary from [facility]--patient was on flomax,  digoxin. amiodarone, metorpolol faxiga, and other meds prior to rehab admission  He had been on apixiban prior to his fall on 7/16 and cardiology was plannig an ablation treatment vs watchman's device prior to his fall  Flomax was commenced post fall/SDH for urinary retention. She reports that patient's cardiologist had recommended to recommence AC  I called his cardiologist Dr Mcfarland today, and got upate  He did not recommend staring AC, rather, he recommended a neurology consult to get clearance for this     7/20  Liaison with providers. I called on  and spoke with Dr Mcfarland, patient's cardiologist--he gave background of patient's cardiac hx--Afib, awaiting ablation vs watchman's device implant prior to his fall  He recommended neurology consult for clearance prior to recommencing AC. He also suggested stopping digoxin AC is to be recommenced     --------------------------------------------  IDT conference on 7/19  TDD: 7/26 to home  Barriers: Forgetful.  Social Work: Lives alone in  with 5-6 ISABEL with 1 HR and 14 STI. Has chair lift. Gets HHA 2-3 hrs 2x per week. Daughter lives close by.  OT: Setup for eating/grooming. CGA for all remaining ADLs/transfers.   PT: CGA for transfers. Ambulated 100 ft with RW with CGA. Negotiated 8 stairs with 2 HR with min A.   SLP: Regular with TLs. Mild cognitive deficits in short term memory. Needs cueing.  Will need family training.   --------------------------------------------  Outpatient Follow-up:  Specialty and Name of physician  Nephrology  Anshu Martino MD  [address] [place] [address], [address]  [facility] [id number] [phone]    [facility]  [address], [address]  [place] [address] [phone]    Electrophysiology  Jason Ortega MD  [address] [place] [address] [phone]    Eliseo Braxton MD  [address] [facility] [address] [phone]    Code Status/Emergency Contact:     Assessment/Plan:  CHADD VALDIVIA is a 83y with PMH of A fib, HFrEF, HTN, and hx of AVR who presented to the [facility] on 7/6 after fall, found to have SDH/SAH.  Hospital Course complicated by A fib RVR and COVID (+). Patient now admitted for a multidisciplinary rehab program. 07-12-23 @ 15:20    * Await return call from Neurology on when to recommence AC   * Echocardiogram EF 55-60%      SAH/SDH  (Left frontal SDH) --7/6  CTH 7/13--No interval change --Trace   subdural hemorrhage along the left side of the posterior falx measuring 2   to 3 mm.  --- Keppra completed on 7/23 per [place] chart review  -- Await return call from Neurology on when to recommence AC  - Aspiration and fall precautions  - Continue comprehensive rehab program of PT/OT/SLP - 3 hours a day, 5 days a week.   - Repeat CTH (7/20): Evolution of hemorrhagic contusion in the anterior inferior LEFT frontal lobe. Scant focus of hemorrhage in the dependent portion of the LEFT lateral ventricle. Resolution of LEFT parafalcine subdural hemorrhage Mild to moderate periventricular white matter ischemia is noted.    A fib  - Course c/b A fib with RVR  - Will require outpatient cardio f/u, patient good candidate for Watchman implant in future  ( Dr Jonas, cardiologist at [facility])   - Amiodarone  - Recommenced Metoprolol ER 12.5 daily     --7/20--D/w Dr Marquez, (patient's cardiologist)--recs to hold digoxin if apixiban is to be recommenced, and to c/w amiodarone and metoprolol  --Cardiology consulted to review cardiac meds in view of his dizziness - Flomax DCd 7/20      HFrEF  - EF 30%  - Continue amiodarone 200mg daily  - ECHO results - EF 55-60%    COVID (+)  - PCR positive 7/7, repeat on 7/11 negative    HTN   (recent hypotension) continue midodrine 5 BID  - Continue lopressor---dose reduced to 12.5mg daily 7/19    Pain  - continue Tylenol     Sleep--hcornic insomina  - Melatonin  9 mg qhs  (fall while on ambien)    GI / Bowel  - Senna qHS  - Miralax PRN Daily  - GI ppx: Protonix    /Bladder  BPH/Urinary retention  - Flomax DCd 7/20 due to possible [place] contribution  --F/u with his urologist    Skin / Pressure injury  --Skin tear x 2 areas right forearm. continue  topical antibiotic cream (neomycin) and  protective dressing   - soft heel protectors    Diet/Dysphagia:  - Diet Consistency: Regular  - Aspiration Precautions  - on SLP f/u  - Nutrition f/u ongoing    DVT prophylaxis:   - SCD    # Health maintenance  - Vitamin B12 and Folate labs - normal       Full code  Living alone since death of wife 6yr ago  Independent with ADLs prior to admission  --------------------------------------------  7/16--Lab mild hyponatremia, Normal Hb and stable CKD    7/16--L;iaison with family--Ms Tavarez (daughter)   Daughter at bed side--gave collateral hx;  Patient on f/u with Dr Oliver at [facility] service, being considered by ablation treatment or Watchman device for his A fib (alternative treatment due to hx of falls) prior to his fall and hosp admission with SDH  Had been independently functioning at baseline  I gave daughter details of patient's function, clinical progress including management of hypotension by reduction of metoprolol dose  I told her that we will be liaising with patient's cardiologist for post discharge plan    7/19--We discussed update of Ms Pimentel, patient's family, functional update and discharge plan. She was happy with the plan  7/20--Discussed with Mr Tavarez (daughter).-she shared patient's last hosp dc summary from [facility]--patient was on flomax,  digoxin. amiodarone, metorpolol faxiga, and other meds prior to rehab admission  He had been on apixiban prior to his fall on 7/16 and cardiology was plannig an ablation treatment vs watchman's device prior to his fall  Flomax was commenced post fall/SDH for urinary retention. She reports that patient's cardiologist had recommended to recommence AC  I called his cardiologist Dr Mcfarland today, and got upate  He did not recommend staring AC, rather, he recommended a neurology consult to get clearance for this     7/20  Liaison with providers. I called on  and spoke with Dr Mcfarland, patient's cardiologist--he gave background of patient's cardiac hx--Afib, awaiting ablation vs watchman's device implant prior to his fall  He recommended neurology consult for clearance prior to recommencing AC. He also suggested stopping digoxin AC is to be recommenced     --------------------------------------------  IDT conference on 7/25  TDD: 8/3 to home  Barriers: Forgetful, poor balance.  Social Work: Lives alone in  with 5-6 ISABEL with 1 HR and 14 STI. Has chair lift. Gets HHA 2-3 hrs 2x per week. Daughter lives close by.  OT: Setup for eating/grooming. CGA for LBD/bathing/toileting.  PT: CG/CS for transfers. Ambulated 100 ft with RW with CG-min A. Negotiated 8 stairs with 2 HR with CGA.  SLP: Regular with TLs. Mild cognitive deficits. Improvements in recall, safety, and fall prevention.   Family training completed.   --------------------------------------------  Outpatient Follow-up:  Specialty and Name of physician  Nephrology  Anshu Martino MD  [address], [address]  [facility] [address]  [phone]    [facility]  [address], [address]  [place] [address] [phone]    Electrophysiology  Jason Ortega MD  [address] [place] [address] [phone]    Eliseo Braxton MD  [address] [facility] [address] [phone]    Code Status/Emergency Contact:     Assessment/Plan:  CHADD VALDIVIA is a 83y with PMH of A fib, HFrEF, HTN, and hx of AVR who presented to the [facility] on 7/6 after fall, found to have SDH/SAH.  Hospital Course complicated by A fib RVR and COVID (+). Patient now admitted for a multidisciplinary rehab program. 07-12-23 @ 15:20    * Neurology clearance for AC approved; AC ordered for 7/28   * Echocardiogram EF 55-60%      SAH/SDH  (Left frontal SDH) --7/6  CTH 7/13--No interval change --Trace   subdural hemorrhage along the left side of the posterior falx measuring 2   to 3 mm.  --- Keppra completed on 7/23 per [place] chart review  --  Neurology clearance for AC approved; AC ordered for 7/28   - Aspiration and fall precautions  - Continue comprehensive rehab program of PT/OT/SLP - 3 hours a day, 5 days a week.   - Repeat CTH (7/20): Evolution of hemorrhagic contusion in the anterior inferior LEFT frontal lobe. Scant focus of hemorrhage in the dependent portion of the LEFT lateral ventricle. Resolution of LEFT parafalcine subdural hemorrhage Mild to moderate periventricular white matter ischemia is noted.    A fib  - Course c/b A fib with RVR  - Will require outpatient cardio f/u, patient good candidate for Watchman implant in future  ( Dr Jonas, cardiologist at [facility])   - Amiodarone  - Recommenced Metoprolol ER 12.5 daily     --7/20--D/w Dr Marquez, (patient's cardiologist)--recs to hold digoxin if apixiban is to be recommenced, and to c/w amiodarone and metoprolol  --Cardiology consulted to review cardiac meds in view of his dizziness - Flomax DCd 7/20      HFrEF  - EF 30%  - Continue amiodarone 200mg daily  - ECHO results - EF 55-60%    COVID (+)  - PCR positive 7/7, repeat on 7/11 negative    HTN   (recent hypotension) continue midodrine 5 BID  - Continue lopressor---dose reduced to 12.5mg daily 7/19    Pain  - continue Tylenol     Sleep--hcornic insomina  - Melatonin  9 mg qhs  (fall while on ambien)    GI / Bowel  - Senna qHS  - Miralax PRN Daily  - GI ppx: Protonix    /Bladder  BPH/Urinary retention  - Flomax DCd 7/20 due to possible [place] contribution  --F/u with his urologist    Skin / Pressure injury  --Skin tear x 2 areas right forearm. continue  topical antibiotic cream (neomycin) and  protective dressing   - soft heel protectors    Diet/Dysphagia:  - Diet Consistency: Regular  - Aspiration Precautions  - on SLP f/u  - Nutrition f/u ongoing    DVT prophylaxis:   - SCD    # Health maintenance  - Vitamin B12 and Folate labs - normal       Full code  Living alone since death of wife 6yr ago  Independent with ADLs prior to admission  --------------------------------------------  7/16--Lab mild hyponatremia, Normal Hb and stable CKD    7/16--L;iaison with family--Ms Tavarez (daughter)   Daughter at bed side--gave collateral hx;  Patient on f/u with Dr Oliver at [facility] service, being considered by ablation treatment or Watchman device for his A fib (alternative treatment due to hx of falls) prior to his fall and hosp admission with SDH  Had been independently functioning at baseline  I gave daughter details of patient's function, clinical progress including management of hypotension by reduction of metoprolol dose  I told her that we will be liaising with patient's cardiologist for post discharge plan    7/19--We discussed update of Ms Pimentel, patient's family, functional update and discharge plan. She was happy with the plan  7/20--Discussed with Mr Tavarez (daughter).-she shared patient's last hosp dc summary from [facility]--patient was on flomax,  digoxin. amiodarone, metorpolol faxiga, and other meds prior to rehab admission  He had been on apixiban prior to his fall on 7/16 and cardiology was plannig an ablation treatment vs watchman's device prior to his fall  Flomax was commenced post fall/SDH for urinary retention. She reports that patient's cardiologist had recommended to recommence AC  I called his cardiologist Dr Mcfarland today, and got upate  He did not recommend staring AC, rather, he recommended a neurology consult to get clearance for this     7/20  Liaison with providers. I called on  and spoke with Dr Mcfarland, patient's cardiologist--he gave background of patient's cardiac hx--Afib, awaiting ablation vs watchman's device implant prior to his fall  He recommended neurology consult for clearance prior to recommencing AC. He also suggested stopping digoxin AC is to be recommenced     --------------------------------------------  IDT conference on 7/25  TDD: 8/3 to home  Barriers: Forgetful, poor balance.  Social Work: Lives alone in  with 5-6 ISABEL with 1 HR and 14 STI. Has chair lift. Gets HHA 2-3 hrs 2x per week. Daughter lives close by.  OT: Setup for eating/grooming. CGA for LBD/bathing/toileting.  PT: CG/CS for transfers. Ambulated 100 ft with RW with CG-min A. Negotiated 8 stairs with 2 HR with CGA.  SLP: Regular with TLs. Mild cognitive deficits. Improvements in recall, safety, and fall prevention.   Family training completed.   --------------------------------------------  Outpatient Follow-up:  Specialty and Name of physician  Nephrology  Anshu Martino MD  [address], [address]  [facility] [address]  [phone]    [facility]  [address]  [place] [address] [phone]    Electrophysiology  Jason Ortega MD  [address] [place] [address] [phone]    Eliseo Braxton MD  [address] [facility] [address] [phone]    Code Status/Emergency Contact:     Assessment/Plan:  CHADD VALDIVIA is a 83y with PMH of A fib, HFrEF, HTN, and hx of AVR who presented to the [facility] on 7/6 after fall, found to have SDH/SAH.  Hospital Course complicated by A fib RVR and COVID (+). Patient now admitted for a multidisciplinary rehab program. 07-12-23 @ 15:20    * Neurology clearance for AC approved; AC ordered for 7/28   * Echocardiogram EF 55-60%  * D/w patient's neurology--Dr Osvaldo Santacruz today, will recommence ac as planned 7/27      SAH/SDH  (Left frontal SDH) --7/6  CTH 7/13--No interval change --Trace   subdural hemorrhage along the left side of the posterior falx measuring 2   to 3 mm.  --- Keppra completed on 7/23 per [place] chart review  --  Neurology clearance for AC approved; AC ordered for 7/28   - Aspiration and fall precautions  - Continue comprehensive rehab program of PT/OT/SLP - 3 hours a day, 5 days a week.   - Repeat CTH (7/20): Evolution of hemorrhagic contusion in the anterior inferior LEFT frontal lobe. Scant focus of hemorrhage in the dependent portion of the LEFT lateral ventricle. Resolution of LEFT parafalcine subdural hemorrhage Mild to moderate periventricular white matter ischemia is noted.    A fib  - Course c/b A fib with RVR  - Will require outpatient cardio f/u, patient good candidate for Watchman implant in future  ( Dr Jonas, cardiologist at [facility])   - Amiodarone  - Recommenced Metoprolol ER 12.5 daily     --7/20--D/w Dr Marquez, (patient's cardiologist)--recs to hold digoxin if apixiban is to be recommenced, and to c/w amiodarone and metoprolol  --Cardiology consulted to review cardiac meds in view of his dizziness - Flomax DCd 7/20      HFrEF  - EF 30%  - Continue amiodarone 200mg daily  - ECHO results - EF 55-60%    COVID (+)  - PCR positive 7/7, repeat on 7/11 negative    HTN   (recent hypotension) continue midodrine 5 BID  - Continue lopressor---dose reduced to 12.5mg daily 7/19    Pain  - continue Tylenol     Sleep--hcornic insomina  - Melatonin  9 mg qhs  (fall while on ambien)    GI / Bowel  - Senna qHS  - Miralax PRN Daily  - GI ppx: Protonix    /Bladder  BPH/Urinary retention  - Flomax DCd 7/20 due to possible [place] contribution  --F/u with his urologist    Skin / Pressure injury  --Skin tear x 2 areas right forearm. continue  topical antibiotic cream (neomycin) and  protective dressing   - soft heel protectors    Diet/Dysphagia:  - Diet Consistency: Regular  - Aspiration Precautions  - on SLP f/u  - Nutrition f/u ongoing    DVT prophylaxis:   - SCD    # Health maintenance  - Vitamin B12 and Folate labs - normal       Full code  Living alone since death of wife 6yr ago  Independent with ADLs prior to admission  --------------------------------------------  7/16--Lab mild hyponatremia, Normal Hb and stable CKD    7/16--L;iaison with family--Ms Tavarez (daughter)   Daughter at [facility]--gave collateral hx;  Patient on f/u with Dr Oliver at [facility] service, being considered by ablation treatment or Watchman device for his A fib (alternative treatment due to hx of falls) prior to his fall and hosp admission with SDH  Had been independently functioning at baseline  I gave daughter details of patient's function, clinical progress including management of hypotension by reduction of metoprolol dose  I told her that we will be liaising with patient's cardiologist for post discharge plan    7/19--We discussed update of Ms Pimentel, patient's family, functional update and discharge plan. She was happy with the plan  7/20--Discussed with Mr Tavarez (daughter).-she shared patient's last hosp dc summary from [facility]--patient was on flomax,  digoxin. amiodarone, metorpolol faxiga, and other meds prior to rehab admission  He had been on apixiban prior to his fall on 7/16 and cardiology was plannig an ablation treatment vs watchman's device prior to his fall  Flomax was commenced post fall/SDH for urinary retention. She reports that patient's cardiologist had recommended to recommence AC  I called his cardiologist Dr Mcfarland today, and got upate  He did not recommend staring AC, rather, he recommended a neurology consult to get clearance for this     7/24--I d/w Ms Tavarez patient's daughter at bed side, plan to d/w neurology to determine appropriate time for recommencement of anticoagulation .She agreed to this plan  I discussed update from IDT today, including extension of dc date to make room for more therapy on falls prevention. she and patient were happy with the update    7/20  Liaison with providers. I called on  and spoke with Dr Mcfarland, patient's cardiologist--he gave background of patient's cardiac hx--Afib, awaiting ablation vs watchman's device implant prior to his fall  He recommended neurology consult for clearance prior to recommencing AC. He also suggested stopping digoxin AC is to be recommenced     7/24--I discussed on phone with Patient's private neurologist--who saw patient during acute care at [facility] post fall and SDH  ,Dr Osvaldo Watkins, ph  , He reviewed patientt's hx and I gave update on 2 recent CTH imaging on 7/13 and 7/20, both showing continued redution in size of hemorrhage and no new bleed  He recommended torecommence anticoagulation 3 wks from time of fall, which would be 7/27  They also gave f/u appointment to patient on 8/16/23 at 3.40 pm at [address] [place] [address]   --------------------------------------------  IDT conference on 7/25  TDD: 8/3 to home  Barriers: Forgetful, poor balance.  Social Work: Lives alone in  with 5-6 ISABEL with 1 HR and 14 STI. Has chair lift. Gets HHA 2-3 hrs 2x per week. Daughter lives close by.  OT: Setup for eating/grooming. CGA for LBD/bathing/toileting.  PT: CG/CS for transfers. Ambulated 100 ft with RW with CG-min A. Negotiated 8 stairs with 2 HR with CGA.  SLP: Regular with TLs. Mild cognitive deficits. Improvements in recall, safety, and fall prevention.   Family training completed.   --------------------------------------------  Outpatient Follow-up:  Specialty and Name of physician  Nephrology  Anshu Martino MD  [address], [address]  [facility] [id number] [phone]    [facility]  [address], [address]  [place] [address] [phone]    Electrophysiology  Jason Ortega MD  [address] [place] [address] [phone]    Eliseo Braxton MD  [address] [facility] [id number] [phone]    Neurology  Dr Osvaldo Watkins  Appointment  8/16/23 at 3.40 pm at [address] [place] [address]

## 2023-07-25 NOTE — PROGRESS NOTE ADULT - SUBJECTIVE AND OBJECTIVE BOX
CC: Traumatic subdural hemorrhage     Today's Subjective & Objective Findings  Patient seen and examined, at bed side.   Patient reported poor sleep last night.  Last BM on 7/24, per patient.   No other complaints at this time.     ROS  Denies headache, dizziness, visual changes, chest pain, SOB/JOLLY, abdominal pain, nausea, vomiting, diarrhea, dysuria, numbness or tingling  interval dizziness    Therapy-- Engaging, working on progressive ambulation with walker  Hypotensive in therapy as reported by Therapists      Vital Signs Last 24 Hrs  T(C): 36.6 (25 Jul 2023 07:44), Max: 36.6 (24 Jul 2023 21:19)  T(F): 97.9 (25 Jul 2023 07:44), Max: 97.9 (24 Jul 2023 21:19)  HR: 91 (25 Jul 2023 07:44) (79 - 96)  BP: 101/67 (25 Jul 2023 07:44) (101/67 - 135/73)  BP(mean): --  RR: 16 (25 Jul 2023 07:44) (15 - 16)  SpO2: 95% (25 Jul 2023 07:44) (94% - 97%)    Parameters below as of 25 Jul 2023 07:44  Patient On (Oxygen Delivery Method): room air    PHYSICAL EXAM:  Gen -  Comfortable,   HEENT - NAD  Neck - Supple,   Pulm - Clear  Cardiovascular - irregular , S1S2  Chest - vesicular breath sounds  Abdomen - Soft, non tender +BS  Extremities - No Cyanosis, no clubbing, no edema, no calf tenderness    Neuro-  AAO X 3,      Cranial Nerves - CN 2-12 intact     Motor - No focal deficits. 4+/5     Sensory - Intact  to LT      Reflexes -2+     Coordination -  no dysmetria     Tone - normal  MSK: Kyphosis  Psychiatric - Mood stable, Affect WNL  Skin:  skin tear right arm resolved      LABS:                        12.8   8.74  )-----------( 209      ( 24 Jul 2023 05:35 )             39.5     07-24    131<L>  |  98  |  30<H>  ----------------------------<  98  5.4<H>   |  31  |  1.48<H>    Ca    9.4      24 Jul 2023 05:35  Mg     2.0     07-24        Urinalysis Basic - ( 24 Jul 2023 05:35 )    Color: x / Appearance: x / SG: x / pH: x  Gluc: 98 mg/dL / Ketone: x  / Bili: x / Urobili: x   Blood: x / Protein: x / Nitrite: x   Leuk Esterase: x / RBC: x / WBC x   Sq Epi: x / Non Sq Epi: x / Bacteria: x      MEDICATIONS  (STANDING):  aMIOdarone    Tablet 200 milliGRAM(s) Oral daily  ammonium lactate 12% Lotion 1 Application(s) Topical two times a day  digoxin     Tablet 125 MICROGram(s) Oral daily  melatonin 9 milliGRAM(s) Oral at bedtime  metoprolol succinate ER 12.5 milliGRAM(s) Oral daily  midodrine. 5 milliGRAM(s) Oral <User Schedule>  neomycin/bacitracin/polymyxin Topical Ointment 1 Application(s) Topical two times a day  senna 2 Tablet(s) Oral at bedtime  zinc oxide 40% Paste 1 Application(s) Topical two times a day    MEDICATIONS  (PRN):  acetaminophen     Tablet .. 650 milliGRAM(s) Oral every 6 hours PRN Mild Pain (1 - 3)  polyethylene glycol 3350 17 Gram(s) Oral daily PRN Constipation CC: Traumatic subdural hemorrhage     Today's Subjective & Objective Findings  Patient seen and examined, at bed side.   Patient reported poor sleep last night.  Last BM on 7/24, per patient.   No other complaints at this time.     ROS  Denies headache, dizziness, visual changes, chest pain, SOB/JOLLY, abdominal pain, nausea, vomiting, diarrhea, dysuria, numbness or tingling  interval dizziness    Therapy-- Engaging, working on progressive ambulation with walker  Hypotensive in therapy as reported by Therapists  IDT today dc date revised and d/w patient and family       Vital Signs Last 24 Hrs  T(C): 36.6 (25 Jul 2023 07:44), Max: 36.6 (24 Jul 2023 21:19)  T(F): 97.9 (25 Jul 2023 07:44), Max: 97.9 (24 Jul 2023 21:19)  HR: 91 (25 Jul 2023 07:44) (79 - 96)  BP: 101/67 (25 Jul 2023 07:44) (101/67 - 135/73)  BP(mean): --  RR: 16 (25 Jul 2023 07:44) (15 - 16)  SpO2: 95% (25 Jul 2023 07:44) (94% - 97%)    Parameters below as of 25 Jul 2023 07:44  Patient On (Oxygen Delivery Method): room air    PHYSICAL EXAM:  Gen -  Comfortable,   HEENT - supple   Neck - Supple,   Pulm - Clear  Cardiovascular - irregular , S1S2  Chest - vesicular breath sounds  Abdomen - Soft, non tender +BS  Extremities - No Cyanosis, no clubbing, no edema, no calf tenderness    Neuro-  AAO X 3,      Cranial Nerves - CN 2-12 intact     Motor - No focal deficits. 4+/5     Sensory - Intact  to LT      Reflexes -2+     Coordination -  no dysmetria     Tone - normal  MSK: Kyphosis  Psychiatric - Mood stable, Affect WNL  Skin: unremarkable       LABS:                        12.8   8.74  )-----------( 209      ( 24 Jul 2023 05:35 )             39.5     07-24    131<L>  |  98  |  30<H>  ----------------------------<  98  5.4<H>   |  31  |  1.48<H>    Ca    9.4      24 Jul 2023 05:35  Mg     2.0     07-24        Urinalysis Basic - ( 24 Jul 2023 05:35 )    Color: x / Appearance: x / SG: x / pH: x  Gluc: 98 mg/dL / Ketone: x  / Bili: x / Urobili: x   Blood: x / Protein: x / Nitrite: x   Leuk Esterase: x / RBC: x / WBC x   Sq Epi: x / Non Sq Epi: x / Bacteria: x      MEDICATIONS  (STANDING):  aMIOdarone    Tablet 200 milliGRAM(s) Oral daily  ammonium lactate 12% Lotion 1 Application(s) Topical two times a day  digoxin     Tablet 125 MICROGram(s) Oral daily  melatonin 9 milliGRAM(s) Oral at bedtime  metoprolol succinate ER 12.5 milliGRAM(s) Oral daily  midodrine. 5 milliGRAM(s) Oral <User Schedule>  neomycin/bacitracin/polymyxin Topical Ointment 1 Application(s) Topical two times a day  senna 2 Tablet(s) Oral at bedtime  zinc oxide 40% Paste 1 Application(s) Topical two times a day    MEDICATIONS  (PRN):  acetaminophen     Tablet .. 650 milliGRAM(s) Oral every 6 hours PRN Mild Pain (1 - 3)  polyethylene glycol 3350 17 Gram(s) Oral daily PRN Constipation

## 2023-07-25 NOTE — PROGRESS NOTE ADULT - SUBJECTIVE AND OBJECTIVE BOX
Medicine Progress Note    Patient is a 83y old  Male who presents with a chief complaint of s/p subdural/subarachnoid hemorrhage (25 Jul 2023 10:50)      SUBJECTIVE / OVERNIGHT EVENTS:  no complaints  resumed AC    ADDITIONAL REVIEW OF SYSTEMS:  denied fever/chills/CP/SOB/cough/palpitation/dizziness/abdominal pian/nausea/vomiting/diarrhoea/constipation/dysuria/leg or calf pain/headaches.all other ROS neg    MEDICATIONS  (STANDING):  aMIOdarone    Tablet 200 milliGRAM(s) Oral daily  ammonium lactate 12% Lotion 1 Application(s) Topical two times a day  digoxin     Tablet 125 MICROGram(s) Oral daily  melatonin 9 milliGRAM(s) Oral at bedtime  metoprolol succinate ER 12.5 milliGRAM(s) Oral daily  midodrine. 5 milliGRAM(s) Oral <User Schedule>  neomycin/bacitracin/polymyxin Topical Ointment 1 Application(s) Topical two times a day  senna 2 Tablet(s) Oral at bedtime  zinc oxide 40% Paste 1 Application(s) Topical two times a day    MEDICATIONS  (PRN):  acetaminophen     Tablet .. 650 milliGRAM(s) Oral every 6 hours PRN Mild Pain (1 - 3)  polyethylene glycol 3350 17 Gram(s) Oral daily PRN Constipation    CAPILLARY BLOOD GLUCOSE        I&O's Summary    24 Jul 2023 07:01  -  25 Jul 2023 07:00  --------------------------------------------------------  IN: 900 mL / OUT: 1150 mL / NET: -250 mL    25 Jul 2023 07:01  -  25 Jul 2023 14:54  --------------------------------------------------------  IN: 0 mL / OUT: 200 mL / NET: -200 mL        PHYSICAL EXAM:  Vital Signs Last 24 Hrs  T(C): 36.6 (25 Jul 2023 07:44), Max: 36.6 (24 Jul 2023 21:19)  T(F): 97.9 (25 Jul 2023 07:44), Max: 97.9 (24 Jul 2023 21:19)  HR: 79 (25 Jul 2023 12:04) (79 - 96)  BP: 119/68 (25 Jul 2023 12:04) (101/67 - 135/73)  BP(mean): --  RR: 16 (25 Jul 2023 12:04) (15 - 16)  SpO2: 95% (25 Jul 2023 12:04) (94% - 97%)    Parameters below as of 25 Jul 2023 12:04  Patient On (Oxygen Delivery Method): room air      GENERAL: Not in distress. Alert    HEENT: clear conjuctiva, MMM. no pallor or icterus  CARDIOVASCULAR: irregular S1, S2. soft SM. no rubs/gallop  LUNGS: BLAE+, no rales, no wheezing, no rhonchi.    ABDOMEN: ND. Soft,  NT, no guarding / rebound / rigidity. BS normoactive  EXTREMITIES: no edema. no leg or calf TP.  SKIN: warm and dry  PSYCHIATRIC: Calm.  No agitation.  CNS: AAO. moves limbs, follows commands    LABS:                        12.8   8.74  )-----------( 209      ( 24 Jul 2023 05:35 )             39.5     07-24    131<L>  |  98  |  30<H>  ----------------------------<  98  5.4<H>   |  31  |  1.48<H>    Ca    9.4      24 Jul 2023 05:35  Mg     2.0     07-24            Urinalysis Basic - ( 24 Jul 2023 05:35 )    Color: x / Appearance: x / SG: x / pH: x  Gluc: 98 mg/dL / Ketone: x  / Bili: x / Urobili: x   Blood: x / Protein: x / Nitrite: x   Leuk Esterase: x / RBC: x / WBC x   Sq Epi: x / Non Sq Epi: x / Bacteria: x            RADIOLOGY & ADDITIONAL TESTS:  Imaging from Last 24 Hours:    Electrocardiogram/QTc Interval:    COORDINATION OF CARE:  Care Discussed with Consultants/Other Providers:

## 2023-07-25 NOTE — DISCHARGE NOTE NURSING/CASE MANAGEMENT/SOCIAL WORK - NSDCPEFALRISK_GEN_ALL_CORE
For information on Fall & Injury Prevention, visit: [email or web address] OR  [email or web address] OR  https://www.cdc.gov/steadi/patient.html

## 2023-07-25 NOTE — DISCHARGE NOTE NURSING/CASE MANAGEMENT/SOCIAL WORK - PATIENT PORTAL LINK FT
You can access the FollowMyHealth Patient Portal offered by [facility] by registering at the following website: [email or web address]. By joining [employer]’s FollowMyHealth portal, you will also be able to view your health information using other applications (apps) compatible with our system.

## 2023-07-25 NOTE — PROGRESS NOTE ADULT - ASSESSMENT
84 yo with PMH of A fib, HFrEF, HTN, and hx of AVR presented to [facility] on 7/6 after fall, found to have SDH/SAH. Hospital Course complicated by A fib RVR and COVID (+). Patient now admitted to [facility] for a multidisciplinary rehab program.     #SAH/SDH  - Continue Keppra 500 mg BID for seizure ppx. duration per neuro  - Eliquis (for AF) on hold  - Aspiration and fall precautions  - Rehab, pain and bowel regimen per primary team    #HFrEF  # chronic A fib  #HTN  # orthostatic hypotension  - EF 30%  - on amiodarone 200 mg qd, digoxin 125 mcg qd, Lopressor 25 mg BID, Farxiga 10 mg qd  - Metoprolol dose was changed from 50 to 25 mg BID on 7/14 for OH, was changed further to metoprolol ER 12.5 mg BID on 7/15. changed to Metoprolol ER 12,5 mg daily and place on h/s instead of am [ guideline directed therapy]. held meorpol  7/21 for OH. resumed today 7/24 with Midodrine support for RVR  - Continue Midodrine 5 mg BID. titrate to keep SBP>90  - held Farxiga for soft BP   - not a candidate for MRA,ACE/ARBS/ARNI due to OH  - Caution with IVF  - educated about postural hypotension and fall prevention  - Outpatient cardio follow up for consideration of Watchman and cardioversion - [facility] Dr. Braxton  - per rehab team: OP cardiologist wanted to resume AC if neuro clears. suggested to dc digoxin if AC is initiated.  - Eliquis was resumed today. neurosurgery cleared. per primary. dced digoxin. increase metoprolol dose to control rate with support with midodrin PRN      #CKD3  - Unknown baseline  - Avoid nephrotoxins  - Monitor    #BPH  - held Flomax 0.4 mg nightly 7/20 for OH. resume as able  - monitor UO    #COVID-19 Infection  - PCR positive 7/7, repeat on 7/11 negative  - Monitor     #DVT prophylaxis  - SCDs  - Dopplers on admission 6/13 negative for DVT    d/w rehab team at [facility]

## 2023-07-26 LAB
ANION GAP SERPL CALC-SCNC: 5 MMOL/L — SIGNIFICANT CHANGE UP (ref 5–17)
BUN SERPL-MCNC: 32 MG/DL — HIGH (ref 7–23)
CALCIUM SERPL-MCNC: 9.2 MG/DL — SIGNIFICANT CHANGE UP (ref 8.4–10.5)
CHLORIDE SERPL-SCNC: 98 MMOL/L — SIGNIFICANT CHANGE UP (ref 96–108)
CO2 SERPL-SCNC: 31 MMOL/L — SIGNIFICANT CHANGE UP (ref 22–31)
CREAT SERPL-MCNC: 1.6 MG/DL — HIGH (ref 0.5–1.3)
EGFR: 43 ML/MIN/1.73M2 — LOW
GLUCOSE SERPL-MCNC: 95 MG/DL — SIGNIFICANT CHANGE UP (ref 70–99)
POTASSIUM SERPL-MCNC: 4.5 MMOL/L — SIGNIFICANT CHANGE UP (ref 3.5–5.3)
POTASSIUM SERPL-SCNC: 4.5 MMOL/L — SIGNIFICANT CHANGE UP (ref 3.5–5.3)
SODIUM SERPL-SCNC: 134 MMOL/L — LOW (ref 135–145)

## 2023-07-26 PROCEDURE — 99232 SBSQ HOSP IP/OBS MODERATE 35: CPT

## 2023-07-26 RX ORDER — DIGOXIN 250 MCG
125 TABLET ORAL DAILY
Refills: 0 | Status: COMPLETED | OUTPATIENT
Start: 2023-07-26 | End: 2023-07-27

## 2023-07-26 RX ADMIN — Medication 9 MILLIGRAM(S): at 21:36

## 2023-07-26 RX ADMIN — ZINC OXIDE 1 APPLICATION(S): 200 OINTMENT TOPICAL at 18:23

## 2023-07-26 RX ADMIN — Medication 125 MICROGRAM(S): at 08:53

## 2023-07-26 RX ADMIN — Medication 1 APPLICATION(S): at 18:22

## 2023-07-26 RX ADMIN — Medication 12.5 MILLIGRAM(S): at 06:41

## 2023-07-26 RX ADMIN — MIDODRINE HYDROCHLORIDE 5 MILLIGRAM(S): 2.5 TABLET ORAL at 06:41

## 2023-07-26 RX ADMIN — MIDODRINE HYDROCHLORIDE 5 MILLIGRAM(S): 2.5 TABLET ORAL at 12:35

## 2023-07-26 RX ADMIN — AMIODARONE HYDROCHLORIDE 200 MILLIGRAM(S): 400 TABLET ORAL at 06:42

## 2023-07-26 RX ADMIN — ZINC OXIDE 1 APPLICATION(S): 200 OINTMENT TOPICAL at 06:42

## 2023-07-26 NOTE — PROGRESS NOTE ADULT - ASSESSMENT
Assessment/Plan:  CHADD VALDIVIA is a 83y with PMH of A fib, HFrEF, HTN, and hx of AVR who presented to the [facility] on 7/6 after fall, found to have SDH/SAH.  Hospital Course complicated by A fib RVR and COVID (+). Patient now admitted for a multidisciplinary rehab program. 07-12-23 @ 15:20    *To commenced apixiban on 7/27 as agreed with his neurologist, Dr Osvaldo Santacruz   * Continue therapy on falls prevention      SAH/SDH  (Left frontal SDH) --7/6  CTH 7/13--No interval change --Trace   subdural hemorrhage along the left side of the posterior falx measuring 2   to 3 mm.  --- Keppra completed on 7/23 per Rachel chart review  --  Neurologist-Dr Osvaldo Santacruz approved to recommence apixiban 7/27  - Aspiration and fall precautions  - Continue comprehensive rehab program of PT/OT/SLP - 3 hours a day, 5 days a week.   - Repeat CTH (7/20): Evolution of hemorrhagic contusion in the anterior inferior LEFT frontal lobe. Scant focus of hemorrhage in the dependent portion of the LEFT lateral ventricle. Resolution of LEFT parafalcine subdural hemorrhage Mild to moderate periventricular white matter ischemia is noted.    A fib  - Course c/b A fib with RVR  - Echocardiogram EF 55-60% 7/25  - Will require outpatient cardio f/u, patient good candidate for Watchman implant in future  ( Dr Jonas, cardiologist at [facility])   - Continue Amiodarone and digoxin,   - C/W Metoprolol ER 12.5 daily, as agreed with Dr Martin (his cardiologist), d/c digoxin whenever AC is commenced     --7/20--D/w Dr Marquez, (patient's cardiologist)--recs to hold digoxin if apixiban is to be recommenced, and to c/w amiodarone and metoprolol  --Cardiology consulted to review cardiac meds in view of his dizziness - Flomax DCd 7/20      HFrEF  - EF 30%  - Continue amiodarone 200mg daily  - ECHO results - EF 55-60%    COVID (+)  - PCR positive 7/7, repeat on 7/11 negative    HTN   (recent hypotension) continue midodrine 5 BID  - Continue lopressor---dose reduced to 12.5mg     Pain  - continue Tylenol     Sleep--hcornic insomina  - Melatonin  9 mg qhs  (fall while on ambien)    GI / Bowel  - Senna qHS  - Miralax PRN Daily  - GI ppx: Protonix    /Bladder  BPH/Urinary retention  - Flomax DCd 7/20 due to possible OH contribution  --F/u with his urologist    Skin / Pressure injury  --Skin tear x 2 areas right forearm. continue  topical antibiotic cream (neomycin) and  protective dressing   - soft heel protectors    Diet/Dysphagia:  - Diet Consistency: Regular  - Aspiration Precautions  - on SLP f/u  - Nutrition f/u ongoing    DVT prophylaxis:   - SCD    # Health maintenance  - Vitamin B12 and Folate labs - normal       Full code  Living alone since death of wife 6yr ago  Independent with ADLs prior to admission  --------------------------------------------  7/16--Lab mild hyponatremia, Normal Hb and stable CKD    7/16--L;iaison with family--Ms Tavarez (daughter)   Daughter at bed side--gave collateral hx;  Patient on f/u with Dr Oliver at [facility] service, being considered by ablation treatment or Watchman device for his A fib (alternative treatment due to hx of falls) prior to his fall and hosp admission with SDH  Had been independently functioning at baseline  I gave daughter details of patient's function, clinical progress including management of hypotension by reduction of metoprolol dose  I told her that we will be liaising with patient's cardiologist for post discharge plan    7/19--We discussed update of Ms Pimentel, patient's family, functional update and discharge plan. She was happy with the plan  7/20--Discussed with Mr Tavarez (daughter).-she shared patient's last hosp dc summary from [facility]--patient was on flomax,  digoxin. amiodarone, metorpolol faxiga, and other meds prior to rehab admission  He had been on apixiban prior to his fall on 7/16 and cardiology was plannig an ablation treatment vs watchman's device prior to his fall  Flomax was commenced post fall/SDH for urinary retention. She reports that patient's cardiologist had recommended to recommence AC  I called his cardiologist Dr Mcfarland today, and got upate  He did not recommend staring AC, rather, he recommended a neurology consult to get clearance for this     7/24--I d/w Ms Tavarez patient's daughter at bed side, plan to d/w neurology to determine appropriate time for recommencement of anticoagulation .She agreed to this plan  I discussed update from IDT today, including extension of dc date to make room for more therapy on falls prevention. she and patient were happy with the update    7/20  Liaison with providers. I called on  and spoke with Dr Mcfarland, patient's cardiologist--he gave background of patient's cardiac hx--Afib, awaiting ablation vs watchman's device implant prior to his fall  He recommended neurology consult for clearance prior to recommencing AC. He also suggested stopping digoxin AC is to be recommenced     7/24--I discussed on phone with Patient's private neurologist--who saw patient during acute care at [facility] post fall and SDH  ,Dr Osvaldo Watkins, ph  , He reviewed patientt's hx and I gave update on 2 recent CTH imaging on 7/13 and 7/20, both showing continued redution in size of hemorrhage and no new bleed  He recommended torecommence anticoagulation 3 wks from time of fall, which would be 7/27  They also gave f/u appointment to patient on 8/16/23 at 3.40 pm at [address] [place] [address]   --------------------------------------------  IDT conference on 7/25  TDD: 8/3 to home  Barriers: Forgetful, poor balance.  Social Work: Lives alone in  with 5-6 ISABEL with 1 HR and 14 STI. Has chair lift. Gets HHA 2-3 hrs 2x per week. Daughter lives close by.  OT: Setup for eating/grooming. CGA for LBD/bathing/toileting.  PT: CG/CS for transfers. Ambulated 100 ft with RW with CG-min A. Negotiated 8 stairs with 2 HR with CGA.  SLP: Regular with TLs. Mild cognitive deficits. Improvements in recall, safety, and fall prevention.   Family training completed.   --------------------------------------------  Outpatient Follow-up:  Specialty and Name of physician  Nephrology  Anshu Martino MD  [address], [address]  [facility] [id number] [phone]    [address], [address]  [place] [address] [phone]    Electrophysiology  Jason Ortega MD  [address] [place] [address] [phone]    Eliseo Braxton MD  [address] [facility] [address] [phone]    Neurology  Dr Osvaldo Watkins  Appointment  8/16/23 at 3.40 pm at [address] [place] [address]

## 2023-07-26 NOTE — PROGRESS NOTE ADULT - SUBJECTIVE AND OBJECTIVE BOX
CC: Traumatic subdural hemorrhage     Today's Subjective & Objective Findings  Patient seen and examined, at bed side, with NP and Med Student Ms Basurto  Patient has chronic insomnia (was on ambien at home prior to fall preceeding acute care, hence was discontinued), however, he reports sleeping better last night  Last BM on 7/25,     ROS  Denies headache, dizziness, visual changes, chest pain, SOB/JOLLY, abdominal pain, nausea, vomiting, diarrhea, dysuria, numbness or tingling  No chest pain or dizziness    Therapy-- Engaging, working on progressive ambulation with walker  BP stable during therapy, engaging well, working on falls prevention    Vital Signs Last 24 Hrs  T(C): 36.6 (25 Jul 2023 07:44), Max: 36.6 (24 Jul 2023 21:19)  T(F): 97.9 (25 Jul 2023 07:44), Max: 97.9 (24 Jul 2023 21:19)  HR: 91 (25 Jul 2023 07:44) (79 - 96)  BP: 101/67 (25 Jul 2023 07:44) (101/67 - 135/73)  BP(mean): --  RR: 16 (25 Jul 2023 07:44) (15 - 16)  SpO2: 95% (25 Jul 2023 07:44) (94% - 97%)    Parameters below as of 25 Jul 2023 07:44  Patient On (Oxygen Delivery Method): room air    PHYSICAL EXAM:  Gen -  Comfortable,   HEENT - supple   Neck - Supple,   Pulm - Clear  Cardiovascular - irregular , S1S2  Chest - clear   Abdomen - Soft, non tender +BS  Extremities - No Cyanosis, no clubbing, no edema, no calf tenderness    Neuro-  AAO X 3,      Cranial Nerves - CN 2-12 intact     Motor - No focal deficits. 4+/5     Sensory - Intact  to LT      Reflexes -2+     Coordination -  no dysmetria     Tone - normal  MSK: Kyphosis  Psychiatric - Mood stable, Affect WNL  Skin: unremarkable       LABS:                        12.8   8.74  )-----------( 209      ( 24 Jul 2023 05:35 )             39.5     07-24    131<L>  |  98  |  30<H>  ----------------------------<  98  5.4<H>   |  31  |  1.48<H>    Ca    9.4      24 Jul 2023 05:35  Mg     2.0     07-24        Urinalysis Basic - ( 24 Jul 2023 05:35 )    Color: x / Appearance: x / SG: x / pH: x  Gluc: 98 mg/dL / Ketone: x  / Bili: x / Urobili: x   Blood: x / Protein: x / Nitrite: x   Leuk Esterase: x / RBC: x / WBC x   Sq Epi: x / Non Sq Epi: x / Bacteria: x      MEDICATIONS  (STANDING):  aMIOdarone    Tablet 200 milliGRAM(s) Oral daily  ammonium lactate 12% Lotion 1 Application(s) Topical two times a day  digoxin     Tablet 125 MICROGram(s) Oral daily  melatonin 9 milliGRAM(s) Oral at bedtime  metoprolol succinate ER 12.5 milliGRAM(s) Oral daily  midodrine. 5 milliGRAM(s) Oral <User Schedule>  neomycin/bacitracin/polymyxin Topical Ointment 1 Application(s) Topical two times a day  senna 2 Tablet(s) Oral at bedtime  zinc oxide 40% Paste 1 Application(s) Topical two times a day    MEDICATIONS  (PRN):  acetaminophen     Tablet .. 650 milliGRAM(s) Oral every 6 hours PRN Mild Pain (1 - 3)  polyethylene glycol 3350 17 Gram(s) Oral daily PRN Constipation

## 2023-07-26 NOTE — PROGRESS NOTE ADULT - SUBJECTIVE AND OBJECTIVE BOX
Patient is a 83y old  Male who presents with a chief complaint of s/p subdural/subarachnoid hemorrhage (25 Jul 2023 14:53)    Reports not sleeping well with melatonin  Denies chest pain, SOB  Tolerating diet, Last BM yesterday  Patient seen and examined at bedside.    ALLERGIES:  No Known Allergies    MEDICATIONS  (STANDING):  aMIOdarone    Tablet 200 milliGRAM(s) Oral daily  ammonium lactate 12% Lotion 1 Application(s) Topical two times a day  digoxin     Tablet 125 MICROGram(s) Oral daily  melatonin 9 milliGRAM(s) Oral at bedtime  metoprolol succinate ER 12.5 milliGRAM(s) Oral daily  midodrine. 5 milliGRAM(s) Oral <User Schedule>  neomycin/bacitracin/polymyxin Topical Ointment 1 Application(s) Topical two times a day  senna 2 Tablet(s) Oral at bedtime  zinc oxide 40% Paste 1 Application(s) Topical two times a day    MEDICATIONS  (PRN):  acetaminophen     Tablet .. 650 milliGRAM(s) Oral every 6 hours PRN Mild Pain (1 - 3)  polyethylene glycol 3350 17 Gram(s) Oral daily PRN Constipation    Vital Signs Last 24 Hrs  T(F): 97.6 (25 Jul 2023 22:23), Max: 97.6 (25 Jul 2023 22:23)  HR: 60 (26 Jul 2023 06:45) (60 - 99)  BP: 120/75 (26 Jul 2023 06:45) (111/70 - 120/75)  RR: 16 (25 Jul 2023 22:23) (16 - 16)  SpO2: 94% (25 Jul 2023 22:23) (94% - 95%)  I&O's Summary    25 Jul 2023 07:01  -  26 Jul 2023 07:00  --------------------------------------------------------  IN: 0 mL / OUT: 200 mL / NET: -200 mL    GENERAL: Not in distress. Alert    HEENT: clear conjuctiva, MMM. no pallor or icterus  CARDIOVASCULAR: irregular S1, S2. soft SM. no rubs/gallop  LUNGS: BLAE+, no rales, no wheezing, no rhonchi.    ABDOMEN: ND. Soft,  NT, no guarding / rebound / rigidity. BS normoactive  EXTREMITIES: no edema. no leg or calf TP.  SKIN: warm and dry  PSYCHIATRIC: Calm.  No agitation.  CNS: AAO. moves limbs, follows commands      LABS:                        12.8   8.74  )-----------( 209      ( 24 Jul 2023 05:35 )             39.5       07-26    134  |  98  |  32  ----------------------------<  95  4.5   |  31  |  1.60    Ca    9.2      26 Jul 2023 05:13  Mg     2.0     07-24    Urinalysis Basic - ( 26 Jul 2023 05:13 )    Color: x / Appearance: x / SG: x / pH: x  Gluc: 95 mg/dL / Ketone: x  / Bili: x / Urobili: x   Blood: x / Protein: x / Nitrite: x   Leuk Esterase: x / RBC: x / WBC x   Sq Epi: x / Non Sq Epi: x / Bacteria: x

## 2023-07-26 NOTE — PROGRESS NOTE ADULT - ASSESSMENT
84 yo with PMH of A fib, HFrEF, HTN, and hx of AVR presented to [facility] on 7/6 after fall, found to have SDH/SAH. Hospital Course complicated by A fib RVR and COVID (+). Patient now admitted to [facility] for a multidisciplinary rehab program.     #SAH/SDH  - Eliquis (for AF)   - Aspiration and fall precautions    #HFrEF  # chronic A fib  #HTN  # orthostatic hypotension  - EF 30%  - Amiodarone 200mg daily, Metoprlol 12.5mg daily, digoxin 125mcg daily x 2 two doses (last dose today)  - Midodrine 5 mg BID  - held Farxiga for soft BP   - not a candidate for ACE/ARBS/ARNI due to OH    #CKD3  - Unknown baseline  - Avoid nephrotoxins  - Monitor    #BPH  - held Flomax 0.4 mg nightly 7/20 for OH. resume as able  - monitor UO    #COVID-19 Infection  - PCR positive 7/7, repeat on 7/11 negative  - Monitor     #DVT prophylaxis: eliquis  Dopplers on admission 6/13 negative for DVT

## 2023-07-27 LAB
ANION GAP SERPL CALC-SCNC: 6 MMOL/L — SIGNIFICANT CHANGE UP (ref 5–17)
BUN SERPL-MCNC: 36 MG/DL — HIGH (ref 7–23)
CALCIUM SERPL-MCNC: 9.4 MG/DL — SIGNIFICANT CHANGE UP (ref 8.4–10.5)
CHLORIDE SERPL-SCNC: 96 MMOL/L — SIGNIFICANT CHANGE UP (ref 96–108)
CO2 SERPL-SCNC: 32 MMOL/L — HIGH (ref 22–31)
CREAT SERPL-MCNC: 1.52 MG/DL — HIGH (ref 0.5–1.3)
EGFR: 46 ML/MIN/1.73M2 — LOW
GLUCOSE SERPL-MCNC: 95 MG/DL — SIGNIFICANT CHANGE UP (ref 70–99)
HCT VFR BLD CALC: 39.7 % — SIGNIFICANT CHANGE UP (ref 39–50)
HGB BLD-MCNC: 13.2 G/DL — SIGNIFICANT CHANGE UP (ref 13–17)
MAGNESIUM SERPL-MCNC: 2 MG/DL — SIGNIFICANT CHANGE UP (ref 1.6–2.6)
MCHC RBC-ENTMCNC: 33.2 GM/DL — SIGNIFICANT CHANGE UP (ref 32–36)
MCHC RBC-ENTMCNC: 33.7 PG — SIGNIFICANT CHANGE UP (ref 27–34)
MCV RBC AUTO: 101.3 FL — HIGH (ref 80–100)
NRBC # BLD: 0 /100 WBCS — SIGNIFICANT CHANGE UP (ref 0–0)
PLATELET # BLD AUTO: 226 K/UL — SIGNIFICANT CHANGE UP (ref 150–400)
POTASSIUM SERPL-MCNC: 5.1 MMOL/L — SIGNIFICANT CHANGE UP (ref 3.5–5.3)
POTASSIUM SERPL-SCNC: 5.1 MMOL/L — SIGNIFICANT CHANGE UP (ref 3.5–5.3)
RBC # BLD: 3.92 M/UL — LOW (ref 4.2–5.8)
RBC # FLD: 13.6 % — SIGNIFICANT CHANGE UP (ref 10.3–14.5)
SODIUM SERPL-SCNC: 134 MMOL/L — LOW (ref 135–145)
WBC # BLD: 7.05 K/UL — SIGNIFICANT CHANGE UP (ref 3.8–10.5)
WBC # FLD AUTO: 7.05 K/UL — SIGNIFICANT CHANGE UP (ref 3.8–10.5)

## 2023-07-27 PROCEDURE — 99232 SBSQ HOSP IP/OBS MODERATE 35: CPT

## 2023-07-27 RX ORDER — MIRTAZAPINE 45 MG/1
7.5 TABLET, ORALLY DISINTEGRATING ORAL AT BEDTIME
Refills: 0 | Status: DISCONTINUED | OUTPATIENT
Start: 2023-07-27 | End: 2023-07-29

## 2023-07-27 RX ADMIN — Medication 12.5 MILLIGRAM(S): at 05:58

## 2023-07-27 RX ADMIN — ZINC OXIDE 1 APPLICATION(S): 200 OINTMENT TOPICAL at 05:59

## 2023-07-27 RX ADMIN — BACITRACIN ZINC NEOMYCIN SULFATE POLYMYXIN B SULFATE 1 APPLICATION(S): 400; 3.5; 5 OINTMENT TOPICAL at 17:41

## 2023-07-27 RX ADMIN — ZINC OXIDE 1 APPLICATION(S): 200 OINTMENT TOPICAL at 17:40

## 2023-07-27 RX ADMIN — Medication 1 APPLICATION(S): at 17:40

## 2023-07-27 RX ADMIN — MIDODRINE HYDROCHLORIDE 5 MILLIGRAM(S): 2.5 TABLET ORAL at 05:58

## 2023-07-27 RX ADMIN — MIDODRINE HYDROCHLORIDE 5 MILLIGRAM(S): 2.5 TABLET ORAL at 12:42

## 2023-07-27 RX ADMIN — Medication 125 MICROGRAM(S): at 05:58

## 2023-07-27 RX ADMIN — MIRTAZAPINE 7.5 MILLIGRAM(S): 45 TABLET, ORALLY DISINTEGRATING ORAL at 21:45

## 2023-07-27 RX ADMIN — AMIODARONE HYDROCHLORIDE 200 MILLIGRAM(S): 400 TABLET ORAL at 05:58

## 2023-07-27 NOTE — PROGRESS NOTE ADULT - ASSESSMENT
Assessment/Plan:  CHADD VALDIVIA is a 83y with PMH of A fib, HFrEF, HTN, and hx of AVR who presented to the [facility] on 7/6 after fall, found to have SDH/SAH.  Hospital Course complicated by A fib RVR and COVID (+). Patient now admitted for a multidisciplinary rehab program. 07-12-23 @ 15:20    #SAH/SDH  (Left frontal SDH) --7/6  -CTH 7/13--No interval change --Trace subdural hemorrhage along the left side of the posterior falx measuring 2 to 3 mm.  - Keppra completed on 7/23 per [place] chart review  - Neurologist-Dr Osvaldo Santacruz approved to restarting apixiban 7/27  - Continue comprehensive rehab program of PT/OT/SLP - 3 hours a day, 5 days a week.   - Repeat CTH (7/20): Evolution of hemorrhagic contusion in the anterior inferior LEFT frontal lobe. Scant focus of hemorrhage in the dependent portion of the LEFT lateral ventricle. Resolution of LEFT parafalcine subdural hemorrhage Mild to moderate periventricular white matter ischemia is noted.    #A fib  - Course c/b A fib with RVR  - Echocardiogram EF 55-60% 7/25  - Will require outpatient cardio f/u, patient good candidate for Watchman implant in future  ( Dr Jonas, cardiologist at [facility])   - Continue Amiodarone  - Continue Metoprolol ER 12.5 daily  -Continue eliquis 5mg BID 7/27  -Stop digoxin as per outpatient caridology due to starting eliquis     #COVID (+) -resolved   - PCR positive 7/7, repeat on 7/11 negative    #Orthostatic hypotension  -continue midodrine 5 BID while on lopressor   - Continue lopressor 12.5mg BID  -Monitor OH daily     #Pain  - continue Tylenol     #Insomnia/mood/depression  - Continue Melatonin  9 mg qhs  -Start Mirtazapine 7.5mg at bedtime for mood/sleep and increase appetite     #GI / Bowel  - Senna qHS  - Miralax PRN Daily  - GI ppx: Protonix    #/Bladder  BPH/Urinary retention  - Flomax DCd 7/20 due to possible OH contribution  --F/u with his urologist    #Skin / Pressure injury  --Skin tear x 2 areas right forearm. continue  topical antibiotic cream (neomycin) and  protective dressing   - soft heel protectors    #Diet/Dysphagia:  - Diet Consistency: Regular  - Aspiration Precautions  - on SLP f/u  - Nutrition f/u ongoing    #DVT prophylaxis:   - SCD    # Health maintenance  - Vitamin B12 and Folate labs - normal   --------------------------------------------  IDT conference on 7/25  TDD: 8/3 to home  Barriers: Forgetful, poor balance.  Social Work: Lives alone in  with 5-6 ISABEL with 1 HR and 14 STI. Has chair lift. Gets HHA 2-3 hrs 2x per week. Daughter lives close by.  OT: Setup for eating/grooming. CGA for LBD/bathing/toileting.  PT: CG/CS for transfers. Ambulated 100 ft with RW with CG-min A. Negotiated 8 stairs with 2 HR with CGA.  SLP: Regular with TLs. Mild cognitive deficits. Improvements in recall, safety, and fall prevention.   Family training completed.   --------------------------------------------  Outpatient Follow-up:  Specialty and Name of physician  Nephrology  Anshu Martino MD  [address] [place] [address], [address]  [facility] [address]  [phone]    [facility]  [address] [place] [address] [address]  [place] [address] [phone]    Electrophysiology  Jason Ortega MD  [address] [place] [address] [phone]    Eliseo Braxton MD  [address] [facility] [address]  [phone]    Neurology  Dr Osvaldo Watkins  Appointment  8/16/23 at 3.40 pm at [address] [place] [address]

## 2023-07-27 NOTE — PROGRESS NOTE ADULT - SUBJECTIVE AND OBJECTIVE BOX
Patient is a 83y old  Male who presents with a chief complaint of s/p subdural/subarachnoid hemorrhage (27 Jul 2023 11:57)      Patient seen and examined at bedside.    ALLERGIES:  No Known Allergies    MEDICATIONS  (STANDING):  aMIOdarone    Tablet 200 milliGRAM(s) Oral daily  ammonium lactate 12% Lotion 1 Application(s) Topical two times a day  melatonin 9 milliGRAM(s) Oral at bedtime  metoprolol succinate ER 12.5 milliGRAM(s) Oral daily  midodrine. 5 milliGRAM(s) Oral <User Schedule>  mirtazapine 7.5 milliGRAM(s) Oral at bedtime  neomycin/bacitracin/polymyxin Topical Ointment 1 Application(s) Topical two times a day  senna 2 Tablet(s) Oral at bedtime  zinc oxide 40% Paste 1 Application(s) Topical two times a day    MEDICATIONS  (PRN):  acetaminophen     Tablet .. 650 milliGRAM(s) Oral every 6 hours PRN Mild Pain (1 - 3)  polyethylene glycol 3350 17 Gram(s) Oral daily PRN Constipation    Vital Signs Last 24 Hrs  T(F): 98.2 (27 Jul 2023 08:37), Max: 98.2 (27 Jul 2023 08:37)  HR: 92 (27 Jul 2023 08:37) (68 - 94)  BP: 124/78 (27 Jul 2023 08:37) (90/50 - 124/78)  RR: 18 (27 Jul 2023 08:37) (16 - 18)  SpO2: 98% (27 Jul 2023 08:37) (96% - 98%)  I&O's Summary    PHYSICAL EXAM:  GENERAL: Not in distress. Alert    HEENT: clear conjuctiva, MMM. no pallor or icterus  CARDIOVASCULAR: irregular S1, S2. soft SM. no rubs/gallop  LUNGS: BLAE+, no rales, no wheezing, no rhonchi.    ABDOMEN: ND. Soft,  NT, no guarding / rebound / rigidity. BS normoactive  EXTREMITIES: no edema. no leg or calf TP.  SKIN: warm and dry  PSYCHIATRIC: Calm.  No agitation.  CNS: AAO. moves limbs, follows commands      LABS:                        13.2   7.05  )-----------( 226      ( 27 Jul 2023 06:46 )             39.7       07-27    134  |  96  |  36  ----------------------------<  95  5.1   |  32  |  1.52    Ca    9.4      27 Jul 2023 06:46  Mg     2.0     07-27    Urinalysis Basic - ( 27 Jul 2023 06:46 )    Color: x / Appearance: x / SG: x / pH: x  Gluc: 95 mg/dL / Ketone: x  / Bili: x / Urobili: x   Blood: x / Protein: x / Nitrite: x   Leuk Esterase: x / RBC: x / WBC x   Sq Epi: x / Non Sq Epi: x / Bacteria: x

## 2023-07-27 NOTE — PROGRESS NOTE ADULT - SUBJECTIVE AND OBJECTIVE BOX
SUBJECTIVE/ROS: Patient seen and examined, at bed side. He states that he still did not sleep well last night despite melatonin. ALso notes that his appetitie has declined and he has had some feelings of depression especailly since he lost his wife a few years ago. He denies headache, dizziness, visual changes, chest pain, SOB/JOLLY, abdominal pain, nausea, vomiting, diarrhea, dysuria, numbness or tingling    Vital Signs Last 24 Hrs  T(C): 36.8 (27 Jul 2023 08:37), Max: 36.8 (27 Jul 2023 08:37)  T(F): 98.2 (27 Jul 2023 08:37), Max: 98.2 (27 Jul 2023 08:37)  HR: 92 (27 Jul 2023 08:37) (68 - 94)  BP: 124/78 (27 Jul 2023 08:37) (90/50 - 124/78)  BP(mean): --  RR: 18 (27 Jul 2023 08:37) (16 - 18)  SpO2: 98% (27 Jul 2023 08:37) (95% - 98%)    Parameters below as of 27 Jul 2023 08:37  Patient On (Oxygen Delivery Method): room air      PHYSICAL EXAM:  Gen -  Comfortable,   HEENT - supple   Neck - Supple,   Pulm - Clear  Cardiovascular - irregular , S1S2  Chest - clear   Abdomen - Soft, non tender +BS  Extremities - No Cyanosis, no clubbing, no edema, no calf tenderness  Neuro-AAO X 3, NUNES 4+/5          LABS:                          13.2   7.05  )-----------( 226      ( 27 Jul 2023 06:46 )             39.7     07-27    134<L>  |  96  |  36<H>  ----------------------------<  95  5.1   |  32<H>  |  1.52<H>    Ca    9.4      27 Jul 2023 06:46  Mg     2.0     07-27          Urinalysis Basic - ( 27 Jul 2023 06:46 )    Color: x / Appearance: x / SG: x / pH: x  Gluc: 95 mg/dL / Ketone: x  / Bili: x / Urobili: x   Blood: x / Protein: x / Nitrite: x   Leuk Esterase: x / RBC: x / WBC x   Sq Epi: x / Non Sq Epi: x / Bacteria: x        MEDICATIONS  (STANDING):  aMIOdarone    Tablet 200 milliGRAM(s) Oral daily  ammonium lactate 12% Lotion 1 Application(s) Topical two times a day  melatonin 9 milliGRAM(s) Oral at bedtime  metoprolol succinate ER 12.5 milliGRAM(s) Oral daily  midodrine. 5 milliGRAM(s) Oral <User Schedule>  mirtazapine 7.5 milliGRAM(s) Oral at bedtime  neomycin/bacitracin/polymyxin Topical Ointment 1 Application(s) Topical two times a day  senna 2 Tablet(s) Oral at bedtime  zinc oxide 40% Paste 1 Application(s) Topical two times a day    MEDICATIONS  (PRN):  acetaminophen     Tablet .. 650 milliGRAM(s) Oral every 6 hours PRN Mild Pain (1 - 3)  polyethylene glycol 3350 17 Gram(s) Oral daily PRN Constipation

## 2023-07-27 NOTE — PROGRESS NOTE ADULT - ASSESSMENT
82 yo with PMH of A fib, HFrEF, HTN, and hx of AVR presented to [facility] on 7/6 after fall, found to have SDH/SAH. Hospital Course complicated by A fib RVR and COVID (+). Patient now admitted to [facility] for a multidisciplinary rehab program.     #SAH/SDH  - Eliquis (for AF)   - Aspiration and fall precautions    #HFrEF  # chronic A fib  #HTN  # orthostatic hypotension  - EF 30%  - Amiodarone 200mg daily, Metoprlol 12.5mg daily  - s/p digoxin 125mcg daily x 2 two doses   - Midodrine 5 mg BID  - held Farxiga for soft BP   - not a candidate for ACE/ARBS/ARNI due to OH    #CKD3  - Unknown baseline  - Avoid nephrotoxins  - Monitor    #BPH  - held Flomax 0.4 mg nightly 7/20 for OH. resume as able  - monitor UO    #COVID-19 Infection  - PCR positive 7/7, repeat on 7/11 negative  - Monitor     #DVT prophylaxis: eliquis

## 2023-07-28 PROCEDURE — 99232 SBSQ HOSP IP/OBS MODERATE 35: CPT

## 2023-07-28 PROCEDURE — 99233 SBSQ HOSP IP/OBS HIGH 50: CPT

## 2023-07-28 RX ORDER — METOPROLOL TARTRATE 50 MG
12.5 TABLET ORAL DAILY
Refills: 0 | Status: DISCONTINUED | OUTPATIENT
Start: 2023-07-28 | End: 2023-08-01

## 2023-07-28 RX ADMIN — Medication 1 APPLICATION(S): at 06:08

## 2023-07-28 RX ADMIN — BACITRACIN ZINC NEOMYCIN SULFATE POLYMYXIN B SULFATE 1 APPLICATION(S): 400; 3.5; 5 OINTMENT TOPICAL at 17:25

## 2023-07-28 RX ADMIN — AMIODARONE HYDROCHLORIDE 200 MILLIGRAM(S): 400 TABLET ORAL at 06:07

## 2023-07-28 RX ADMIN — MIDODRINE HYDROCHLORIDE 5 MILLIGRAM(S): 2.5 TABLET ORAL at 06:07

## 2023-07-28 RX ADMIN — ZINC OXIDE 1 APPLICATION(S): 200 OINTMENT TOPICAL at 06:08

## 2023-07-28 RX ADMIN — APIXABAN 5 MILLIGRAM(S): 2.5 TABLET, FILM COATED ORAL at 17:23

## 2023-07-28 RX ADMIN — Medication 1 APPLICATION(S): at 17:23

## 2023-07-28 RX ADMIN — MIRTAZAPINE 7.5 MILLIGRAM(S): 45 TABLET, ORALLY DISINTEGRATING ORAL at 21:28

## 2023-07-28 RX ADMIN — APIXABAN 5 MILLIGRAM(S): 2.5 TABLET, FILM COATED ORAL at 06:07

## 2023-07-28 RX ADMIN — Medication 12.5 MILLIGRAM(S): at 08:34

## 2023-07-28 RX ADMIN — Medication 9 MILLIGRAM(S): at 21:28

## 2023-07-28 RX ADMIN — MIDODRINE HYDROCHLORIDE 5 MILLIGRAM(S): 2.5 TABLET ORAL at 13:07

## 2023-07-28 RX ADMIN — BACITRACIN ZINC NEOMYCIN SULFATE POLYMYXIN B SULFATE 1 APPLICATION(S): 400; 3.5; 5 OINTMENT TOPICAL at 06:11

## 2023-07-28 RX ADMIN — ZINC OXIDE 1 APPLICATION(S): 200 OINTMENT TOPICAL at 17:23

## 2023-07-28 NOTE — PROGRESS NOTE ADULT - SUBJECTIVE AND OBJECTIVE BOX
CHIEF COMPLAINT: no new complaints, no headaches, no seizures, no new motor, sensory visual complaints. Good spirits, motivated in therapy, good energy       HISTORY OF PRESENT ILLNESS    This is an 83 year old male with PMH of A fib, HFrEF, HTN, and hx of AVR who presented to the [facility] on 7/6 after fall. He was found to have a subdural/subarachnoid hemorrhage. Hospital course complicated by A fib with RVR s/p amiodarone and metoprolol. Per cardio recs, recommended by EPS and pt a good candidate for Watchman implant in future and will require outpatient f/u. Course further complicated by COVID (+) on 7/6. Repeat PCR on 7/11 negative. Patient evaluated by PT/OT and recommended for acute inpatient rehab. Patient is medically stable for DC to [facility] on 7/12. (12 Jul 2023 15:07)        PAST MEDICAL & SURGICAL HISTORY:  Aortic valve replaced      Hypertension      Atrial fibrillation      Congestive heart failure (CHF)      Aortic valve replaced      S/P total knee arthroplasty      S/P hernia repair        VITALS  Vital Signs Last 24 Hrs  T(C): 36.8 (28 Jul 2023 08:12), Max: 36.8 (27 Jul 2023 21:30)  T(F): 98.3 (28 Jul 2023 08:12), Max: 98.3 (28 Jul 2023 08:12)  HR: 100 (28 Jul 2023 08:12) (82 - 100)  BP: 160/90 (28 Jul 2023 08:12) (134/74 - 160/90)  BP(mean): --  RR: 16 (28 Jul 2023 08:12) (16 - 18)  SpO2: 95% (28 Jul 2023 08:12) (95% - 95%)    Parameters below as of 28 Jul 2023 08:12  Patient On (Oxygen Delivery Method): room air          PHYSICAL EXAM  Constitutional - NAD, Comfortable  HEENT - NCAT, EOMI  Neck - Supple, No limited ROM  Chest - CTA bilaterally  Cardiovascular - RRR, S1S2  Abdomen - Soft, NTND  Extremities -  No calf tenderness   Neurologic Exam - no new focal deficits                         RECENT LABS                        13.2   7.05  )-----------( 226      ( 27 Jul 2023 06:46 )             39.7     07-27    134<L>  |  96  |  36<H>  ----------------------------<  95  5.1   |  32<H>  |  1.52<H>    Ca    9.4      27 Jul 2023 06:46  Mg     2.0     07-27          Urinalysis Basic - ( 27 Jul 2023 06:46 )    Color: x / Appearance: x / SG: x / pH: x  Gluc: 95 mg/dL / Ketone: x  / Bili: x / Urobili: x   Blood: x / Protein: x / Nitrite: x   Leuk Esterase: x / RBC: x / WBC x   Sq Epi: x / Non Sq Epi: x / Bacteria: x                  Urinalysis Basic - ( 27 Jul 2023 06:46 )    Color: x / Appearance: x / SG: x / pH: x  Gluc: 95 mg/dL / Ketone: x  / Bili: x / Urobili: x   Blood: x / Protein: x / Nitrite: x   Leuk Esterase: x / RBC: x / WBC x   Sq Epi: x / Non Sq Epi: x / Bacteria: x                CURRENT MEDICATIONS  MEDICATIONS  (STANDING):  aMIOdarone    Tablet 200 milliGRAM(s) Oral daily  ammonium lactate 12% Lotion 1 Application(s) Topical two times a day  apixaban 5 milliGRAM(s) Oral two times a day  melatonin 9 milliGRAM(s) Oral at bedtime  metoprolol succinate ER 12.5 milliGRAM(s) Oral daily  midodrine. 5 milliGRAM(s) Oral <User Schedule>  mirtazapine 7.5 milliGRAM(s) Oral at bedtime  neomycin/bacitracin/polymyxin Topical Ointment 1 Application(s) Topical two times a day  senna 2 Tablet(s) Oral at bedtime  zinc oxide 40% Paste 1 Application(s) Topical two times a day    MEDICATIONS  (PRN):  acetaminophen     Tablet .. 650 milliGRAM(s) Oral every 6 hours PRN Mild Pain (1 - 3)  polyethylene glycol 3350 17 Gram(s) Oral daily PRN Constipation

## 2023-07-28 NOTE — PROGRESS NOTE ADULT - SUBJECTIVE AND OBJECTIVE BOX
Patient is a 83y old  Male who presents with a chief complaint of s/p subdural/subarachnoid hemorrhage (27 Jul 2023 13:29)      Patient seen and examined at bedside.    ALLERGIES:  No Known Allergies    MEDICATIONS  (STANDING):  aMIOdarone    Tablet 200 milliGRAM(s) Oral daily  ammonium lactate 12% Lotion 1 Application(s) Topical two times a day  apixaban 5 milliGRAM(s) Oral two times a day  melatonin 9 milliGRAM(s) Oral at bedtime  metoprolol succinate ER 12.5 milliGRAM(s) Oral daily  midodrine. 5 milliGRAM(s) Oral <User Schedule>  mirtazapine 7.5 milliGRAM(s) Oral at bedtime  neomycin/bacitracin/polymyxin Topical Ointment 1 Application(s) Topical two times a day  senna 2 Tablet(s) Oral at bedtime  zinc oxide 40% Paste 1 Application(s) Topical two times a day    MEDICATIONS  (PRN):  acetaminophen     Tablet .. 650 milliGRAM(s) Oral every 6 hours PRN Mild Pain (1 - 3)  polyethylene glycol 3350 17 Gram(s) Oral daily PRN Constipation    Vital Signs Last 24 Hrs  T(F): 98.3 (28 Jul 2023 08:12), Max: 98.3 (28 Jul 2023 08:12)  HR: 100 (28 Jul 2023 08:12) (82 - 100)  BP: 160/90 (28 Jul 2023 08:12) (134/74 - 160/90)  RR: 16 (28 Jul 2023 08:12) (16 - 18)  SpO2: 95% (28 Jul 2023 08:12) (95% - 95%)  I&O's Summary    PHYSICAL EXAM:  GENERAL: Not in distress. Alert    HEENT: clear conjuctiva, MMM. no pallor or icterus  CARDIOVASCULAR: irregular S1, S2. soft SM. no rubs/gallop  LUNGS: BLAE+, no rales, no wheezing, no rhonchi.    ABDOMEN: ND. Soft,  NT, no guarding / rebound / rigidity. BS normoactive  EXTREMITIES: no edema. no leg or calf TP.  SKIN: warm and dry  PSYCHIATRIC: Calm.  No agitation.  CNS: AAO. moves limbs, follows commands      LABS:                        13.2   7.05  )-----------( 226      ( 27 Jul 2023 06:46 )             39.7       07-27    134  |  96  |  36  ----------------------------<  95  5.1   |  32  |  1.52    Ca    9.4      27 Jul 2023 06:46  Mg     2.0     07-27    Urinalysis Basic - ( 27 Jul 2023 06:46 )    Color: x / Appearance: x / SG: x / pH: x  Gluc: 95 mg/dL / Ketone: x  / Bili: x / Urobili: x   Blood: x / Protein: x / Nitrite: x   Leuk Esterase: x / RBC: x / WBC x   Sq Epi: x / Non Sq Epi: x / Bacteria: x

## 2023-07-28 NOTE — PROGRESS NOTE ADULT - ASSESSMENT
82 yo with PMH of A fib, HFrEF, HTN, and hx of AVR presented to [facility] on 7/6 after fall, found to have SDH/SAH. Hospital Course complicated by A fib RVR and COVID (+). Patient now admitted to [facility] for a multidisciplinary rehab program.     #SAH/SDH  - Eliquis (for AF)   - Aspiration and fall precautions    #HFrEF  # chronic A fib  #HTN  # orthostatic hypotension  - EF 30%  - Amiodarone 200mg daily, Metoprlol 12.5mg daily  - s/p digoxin 125mcg daily x 2 two doses   - Midodrine 5 mg BID  - held Farxiga for soft BP   - not a candidate for ACE/ARBS/ARNI due to OH    #CKD3  - Unknown baseline  - Avoid nephrotoxins  - Monitor    #BPH  - held Flomax 0.4 mg nightly 7/20 for OH. resume as able  - monitor UO    #COVID-19 Infection  - PCR positive 7/7, repeat on 7/11 negative  - Monitor     #DVT prophylaxis: eliquis

## 2023-07-28 NOTE — PROGRESS NOTE ADULT - ASSESSMENT
Assessment/Plan:  CHADD VALDIVIA is a 83y with PMH of A fib, HFrEF, HTN, and hx of AVR who presented to the [facility] on 7/6 after fall, found to have SDH/SAH.  Hospital Course complicated by A fib RVR and COVID (+). Patient now admitted for a multidisciplinary rehab program. 07-12-23 @ 15:20    #SAH/SDH  (Left frontal SDH) --7/6  -CTH 7/13--No interval change --Trace subdural hemorrhage along the left side of the posterior falx measuring 2 to 3 mm.  - Keppra completed on 7/23 per [place] chart review  - Neurologist-Dr Osvaldo Santacruz approved to restarting apixiban 7/27  - Continue comprehensive rehab program of PT/OT/SLP - 3 hours a day, 5 days a week.   - Repeat CTH (7/20): Evolution of hemorrhagic contusion in the anterior inferior LEFT frontal lobe. Scant focus of hemorrhage in the dependent portion of the LEFT lateral ventricle. Resolution of LEFT parafalcine subdural hemorrhage Mild to moderate periventricular white matter ischemia is noted.    #A fib  - Course c/b A fib with RVR  - Echocardiogram EF 55-60% 7/25  - Will require outpatient cardio f/u, patient good candidate for Watchman implant in future  ( Dr Jonas, cardiologist at [facility])   - Continue Amiodarone  - Continue Metoprolol ER 12.5 daily  -Continue Eliquis 5mg BID 7/27 - will repeat Head CT while AC is restarted for close radiological surveillance   -Stop digoxin as per outpatient cardiology due to starting ELIQUIS     #COVID (+) -resolved   - PCR positive 7/7, repeat on 7/11 negative    #Orthostatic hypotension  -continue midodrine 5 BID while on lopressor   - Continue lopressor 12.5mg BID  -Monitor OH daily     #Pain  - continue Tylenol     #Insomnia/mood/depression  - Continue Melatonin  9 mg qhs  -Start Mirtazapine 7.5mg at bedtime for mood/sleep and increase appetite     #GI / Bowel  - Senna qHS  - Miralax PRN Daily  - GI ppx: Protonix    #/Bladder  BPH/Urinary retention  - Flomax DCd 7/20 due to possible OH contribution  --F/u with his urologist    #Skin / Pressure injury  --Skin tear x 2 areas right forearm. continue  topical antibiotic cream (neomycin) and  protective dressing   - soft heel protectors    #Diet/Dysphagia:  - Diet Consistency: Regular  - Aspiration Precautions  - on SLP f/u  - Nutrition f/u ongoing    #DVT prophylaxis:   - SCD    # Health maintenance  - Vitamin B12 and Folate labs - normal   --------------------------------------------  IDT conference on 7/25  TDD: 8/3 to home  Barriers: Forgetful, poor balance.  Social Work: Lives alone in  with 5-6 ISABEL with 1 HR and 14 STI. Has chair lift. Gets HHA 2-3 hrs 2x per week. Daughter lives close by.  OT: Setup for eating/grooming. CGA for LBD/bathing/toileting.  PT: CG/CS for transfers. Ambulated 100 ft with RW with CG-min A. Negotiated 8 stairs with 2 HR with CGA.  SLP: Regular with TLs. Mild cognitive deficits. Improvements in recall, safety, and fall prevention.   Family training completed.   --------------------------------------------  Outpatient Follow-up:  Specialty and Name of physician  Nephrology  Anshu Martino MD  [address] [place] [address], [address]  [facility] [address]  [phone]    Neurosurgery  [address] [facility], [address]  [place] [address]  [phone]    Electrophysiology  Jason Ortega MD  [address] [place] [address] [phone]    Eliseo Braxton MD  [address] [facility] [address]  [phone]    Neurology  Dr Osvalod Watkins  Appointment  8/16/23 at 3.40 pm at [address] [place] [address]

## 2023-07-29 PROCEDURE — 70450 CT HEAD/BRAIN W/O DYE: CPT | Mod: 26

## 2023-07-29 PROCEDURE — 99232 SBSQ HOSP IP/OBS MODERATE 35: CPT

## 2023-07-29 RX ADMIN — MIDODRINE HYDROCHLORIDE 5 MILLIGRAM(S): 2.5 TABLET ORAL at 12:26

## 2023-07-29 RX ADMIN — ZINC OXIDE 1 APPLICATION(S): 200 OINTMENT TOPICAL at 17:10

## 2023-07-29 RX ADMIN — Medication 1 APPLICATION(S): at 17:10

## 2023-07-29 RX ADMIN — Medication 12.5 MILLIGRAM(S): at 05:33

## 2023-07-29 RX ADMIN — MIDODRINE HYDROCHLORIDE 5 MILLIGRAM(S): 2.5 TABLET ORAL at 05:33

## 2023-07-29 RX ADMIN — BACITRACIN ZINC NEOMYCIN SULFATE POLYMYXIN B SULFATE 1 APPLICATION(S): 400; 3.5; 5 OINTMENT TOPICAL at 05:42

## 2023-07-29 RX ADMIN — APIXABAN 5 MILLIGRAM(S): 2.5 TABLET, FILM COATED ORAL at 17:09

## 2023-07-29 RX ADMIN — AMIODARONE HYDROCHLORIDE 200 MILLIGRAM(S): 400 TABLET ORAL at 05:33

## 2023-07-29 RX ADMIN — APIXABAN 5 MILLIGRAM(S): 2.5 TABLET, FILM COATED ORAL at 05:32

## 2023-07-29 RX ADMIN — Medication 1 APPLICATION(S): at 05:34

## 2023-07-29 RX ADMIN — ZINC OXIDE 1 APPLICATION(S): 200 OINTMENT TOPICAL at 05:34

## 2023-07-29 NOTE — PROGRESS NOTE ADULT - ASSESSMENT
82 yo with PMH of A fib, HFrEF, HTN, and hx of AVR presented to [facility] on 7/6 after fall, found to have SDH/SAH. Hospital Course complicated by A fib RVR and COVID (+). Patient now admitted to [facility] for a multidisciplinary rehab program.     #SAH/SDH  - Eliquis (for AF)   - Aspiration and fall precautions    #HFrEF  # chronic A fib  #HTN  # orthostatic hypotension  - EF 30%  - Amiodarone 200mg daily, Metoprlol 12.5mg daily  - s/p digoxin 125mcg daily x 2 two doses   - Midodrine 5 mg BID  - held Farxiga for soft BP   - not a candidate for ACE/ARBS/ARNI due to OH    #CKD3  - Unknown baseline  - Avoid nephrotoxins  - Monitor    #BPH  - held Flomax 0.4 mg nightly 7/20 for OH. resume as able  - monitor UO    #COVID-19 Infection  - PCR positive 7/7, repeat on 7/11 negative  - Monitor     #DVT prophylaxis: eliquis   82 yo with PMH of A fib, HFrEF, HTN, and hx of AVR presented to [facility] on 7/6 after fall, found to have SDH/SAH. Hospital Course complicated by A fib RVR and COVID (+). Patient now admitted to [facility] for a multidisciplinary rehab program.     #SAH/SDH  - Eliquis (for AF)   - Aspiration and fall precautions    #HFrEF  # chronic A fib  #HTN  # orthostatic hypotension  - EF 30%  - Amiodarone 200mg daily, Metoprlol 12.5mg daily  - s/p digoxin 125mcg daily x 2 two doses   - Midodrine 5 mg BID  - held Farxiga for soft BP   - not a candidate for ACE/ARBS/ARNI due to OH    #CKD3  - Unknown baseline  - Avoid nephrotoxins  - Monitor    #BPH  #Urinary retention  - Will check UA/UCX, r/o UTI  - held Flomax 0.4 mg nightly 7/20 for OH. resume as able  - monitor UO    #COVID-19 Infection  - PCR positive 7/7, repeat on 7/11 negative  - Monitor     #DVT prophylaxis: eliquis

## 2023-07-29 NOTE — PROGRESS NOTE ADULT - SUBJECTIVE AND OBJECTIVE BOX
Pt. seen and examined at bedside.  No overnight events.      REVIEW OF SYSTEMS  Constitutional - No fever,  No fatigue  Neurological - No headaches, No loss of strength  Musculoskeletal - No joint pain, No joint swelling, No muscle pain    VITALS  T(C): 36.7 (07-28-23 @ 21:32), Max: 36.7 (07-28-23 @ 21:32)  HR: 100 (07-29-23 @ 05:31) (89 - 100)  BP: 124/79 (07-29-23 @ 05:31) (122/71 - 124/79)  RR: 15 (07-28-23 @ 21:32) (15 - 15)  SpO2: 96% (07-28-23 @ 21:32) (96% - 96%)  Wt(kg): --       MEDICATIONS   acetaminophen     Tablet .. 650 milliGRAM(s) every 6 hours PRN  aMIOdarone    Tablet 200 milliGRAM(s) daily  ammonium lactate 12% Lotion 1 Application(s) two times a day  apixaban 5 milliGRAM(s) two times a day  melatonin 9 milliGRAM(s) at bedtime  metoprolol succinate ER 12.5 milliGRAM(s) daily  midodrine. 5 milliGRAM(s) <User Schedule>  mirtazapine 7.5 milliGRAM(s) at bedtime  neomycin/bacitracin/polymyxin Topical Ointment 1 Application(s) two times a day  polyethylene glycol 3350 17 Gram(s) daily PRN  senna 2 Tablet(s) at bedtime  zinc oxide 40% Paste 1 Application(s) two times a day      RECENT LABS/IMAGING                        ---------  PHYSICAL EXAM  Constitutional - NAD, Comfortable  Pulm - Breathing comfortably, No wheezing  Abd - Soft, NTND  Extremities - No edema, No calf tenderness  Neurologic Exam -                    Cognitive - A&Ox2, confused     Communication - Fluent     Motor - No focal deficits     Sensory - Intact to LT  Psychiatric - Mood WNL, Affect WNL    ASSESSMENT/PLAN  83y Male with functional deficits s/p SDH/SAH.  Continue current medical management  Pain - Tylenol PRN  DVT PPX - apixaban 5 milliGRAM(s) two times a day  Active issues - repeat HEAD CT -   Continue 3hrs a day of comprehensive rehab program.

## 2023-07-29 NOTE — PROGRESS NOTE ADULT - SUBJECTIVE AND OBJECTIVE BOX
Patient is a 83y old  Male who presents with a chief complaint of s/p subdural/subarachnoid hemorrhage      Patient seen and examined at bedside.    ALLERGIES:  No Known Allergies    MEDICATIONS  (STANDING):  aMIOdarone    Tablet 200 milliGRAM(s) Oral daily  ammonium lactate 12% Lotion 1 Application(s) Topical two times a day  apixaban 5 milliGRAM(s) Oral two times a day  melatonin 9 milliGRAM(s) Oral at bedtime  metoprolol succinate ER 12.5 milliGRAM(s) Oral daily  midodrine. 5 milliGRAM(s) Oral <User Schedule>  mirtazapine 7.5 milliGRAM(s) Oral at bedtime  neomycin/bacitracin/polymyxin Topical Ointment 1 Application(s) Topical two times a day  senna 2 Tablet(s) Oral at bedtime  zinc oxide 40% Paste 1 Application(s) Topical two times a day    MEDICATIONS  (PRN):  acetaminophen     Tablet .. 650 milliGRAM(s) Oral every 6 hours PRN Mild Pain (1 - 3)  polyethylene glycol 3350 17 Gram(s) Oral daily PRN Constipation    Vital Signs Last 24 Hrs  T(C): 36.7 (28 Jul 2023 21:32), Max: 36.7 (28 Jul 2023 21:32)  T(F): 98.1 (28 Jul 2023 21:32), Max: 98.1 (28 Jul 2023 21:32)  HR: 100 (29 Jul 2023 05:31) (89 - 100)  BP: 124/79 (29 Jul 2023 05:31) (122/71 - 124/79)  RR: 15 (28 Jul 2023 21:32) (15 - 15)  SpO2: 96% (28 Jul 2023 21:32) (96% - 96%)    Parameters below as of 28 Jul 2023 21:32  Patient On (Oxygen Delivery Method): room air    PHYSICAL EXAM:  GENERAL: Not in distress. Alert    HEENT: clear conjuctiva, MMM. no pallor or icterus  CARDIOVASCULAR: irregular S1, S2. soft SM. no rubs/gallop  LUNGS: BLAE+, no rales, no wheezing, no rhonchi.    ABDOMEN: ND. Soft,  NT, no guarding / rebound / rigidity. BS normoactive  EXTREMITIES: no edema. no leg or calf TP.  SKIN: warm and dry  PSYCHIATRIC: Calm.  No agitation.  CNS: AAO. moves limbs, follows commands      LABS:                        13.2   7.05  )-----------( 226      ( 27 Jul 2023 06:46 )             39.7       07-27    134  |  96  |  36  ----------------------------<  95  5.1   |  32  |  1.52    Ca    9.4      27 Jul 2023 06:46  Mg     2.0     07-27    Urinalysis Basic - ( 27 Jul 2023 06:46 )    Color: x / Appearance: x / SG: x / pH: x  Gluc: 95 mg/dL / Ketone: x  / Bili: x / Urobili: x   Blood: x / Protein: x / Nitrite: x   Leuk Esterase: x / RBC: x / WBC x   Sq Epi: x / Non Sq Epi: x / Bacteria: x             Patient is a 83y old  Male who presents with a chief complaint of s/p subdural/subarachnoid hemorrhage    Nursing noted urinary retention >700ml  Patient seen and examined at bedside.    ALLERGIES:  No Known Allergies    MEDICATIONS  (STANDING):  aMIOdarone    Tablet 200 milliGRAM(s) Oral daily  ammonium lactate 12% Lotion 1 Application(s) Topical two times a day  apixaban 5 milliGRAM(s) Oral two times a day  melatonin 9 milliGRAM(s) Oral at bedtime  metoprolol succinate ER 12.5 milliGRAM(s) Oral daily  midodrine. 5 milliGRAM(s) Oral <User Schedule>  mirtazapine 7.5 milliGRAM(s) Oral at bedtime  neomycin/bacitracin/polymyxin Topical Ointment 1 Application(s) Topical two times a day  senna 2 Tablet(s) Oral at bedtime  zinc oxide 40% Paste 1 Application(s) Topical two times a day    MEDICATIONS  (PRN):  acetaminophen     Tablet .. 650 milliGRAM(s) Oral every 6 hours PRN Mild Pain (1 - 3)  polyethylene glycol 3350 17 Gram(s) Oral daily PRN Constipation    Vital Signs Last 24 Hrs  T(C): 36.7 (28 Jul 2023 21:32), Max: 36.7 (28 Jul 2023 21:32)  T(F): 98.1 (28 Jul 2023 21:32), Max: 98.1 (28 Jul 2023 21:32)  HR: 100 (29 Jul 2023 05:31) (89 - 100)  BP: 124/79 (29 Jul 2023 05:31) (122/71 - 124/79)  RR: 15 (28 Jul 2023 21:32) (15 - 15)  SpO2: 96% (28 Jul 2023 21:32) (96% - 96%)    Parameters below as of 28 Jul 2023 21:32  Patient On (Oxygen Delivery Method): room air    PHYSICAL EXAM:  GENERAL: Not in distress. Alert    HEENT: clear conjuctiva, MMM. no pallor or icterus  CARDIOVASCULAR: irregular S1, S2. soft SM. no rubs/gallop  LUNGS: BLAE+, no rales, no wheezing, no rhonchi.    ABDOMEN: ND. Soft,  NT, no guarding / rebound / rigidity. BS normoactive  EXTREMITIES: no edema. no leg or calf TP.  SKIN: warm and dry  PSYCHIATRIC: Calm.  No agitation.  CNS: AAO. moves limbs, follows commands      LABS:                        13.2   7.05  )-----------( 226      ( 27 Jul 2023 06:46 )             39.7       07-27    134  |  96  |  36  ----------------------------<  95  5.1   |  32  |  1.52    Ca    9.4      27 Jul 2023 06:46  Mg     2.0     07-27    Urinalysis Basic - ( 27 Jul 2023 06:46 )    Color: x / Appearance: x / SG: x / pH: x  Gluc: 95 mg/dL / Ketone: x  / Bili: x / Urobili: x   Blood: x / Protein: x / Nitrite: x   Leuk Esterase: x / RBC: x / WBC x   Sq Epi: x / Non Sq Epi: x / Bacteria: x

## 2023-07-30 LAB
ANION GAP SERPL CALC-SCNC: 6 MMOL/L — SIGNIFICANT CHANGE UP (ref 5–17)
APPEARANCE UR: ABNORMAL
BASOPHILS # BLD AUTO: 0.03 K/UL — SIGNIFICANT CHANGE UP (ref 0–0.2)
BASOPHILS NFR BLD AUTO: 0.3 % — SIGNIFICANT CHANGE UP (ref 0–2)
BILIRUB UR-MCNC: NEGATIVE — SIGNIFICANT CHANGE UP
BUN SERPL-MCNC: 35 MG/DL — HIGH (ref 7–23)
CALCIUM SERPL-MCNC: 9.4 MG/DL — SIGNIFICANT CHANGE UP (ref 8.4–10.5)
CHLORIDE SERPL-SCNC: 98 MMOL/L — SIGNIFICANT CHANGE UP (ref 96–108)
CO2 SERPL-SCNC: 32 MMOL/L — HIGH (ref 22–31)
COLOR SPEC: YELLOW — SIGNIFICANT CHANGE UP
CREAT SERPL-MCNC: 1.44 MG/DL — HIGH (ref 0.5–1.3)
DIFF PNL FLD: ABNORMAL
EGFR: 49 ML/MIN/1.73M2 — LOW
EOSINOPHIL # BLD AUTO: 0 K/UL — SIGNIFICANT CHANGE UP (ref 0–0.5)
EOSINOPHIL NFR BLD AUTO: 0 % — SIGNIFICANT CHANGE UP (ref 0–6)
GLUCOSE SERPL-MCNC: 121 MG/DL — HIGH (ref 70–99)
GLUCOSE UR QL: NEGATIVE MG/DL — SIGNIFICANT CHANGE UP
HCT VFR BLD CALC: 40.1 % — SIGNIFICANT CHANGE UP (ref 39–50)
HGB BLD-MCNC: 13.1 G/DL — SIGNIFICANT CHANGE UP (ref 13–17)
IMM GRANULOCYTES NFR BLD AUTO: 0.4 % — SIGNIFICANT CHANGE UP (ref 0–0.9)
KETONES UR-MCNC: NEGATIVE MG/DL — SIGNIFICANT CHANGE UP
LEUKOCYTE ESTERASE UR-ACNC: ABNORMAL
LYMPHOCYTES # BLD AUTO: 1.29 K/UL — SIGNIFICANT CHANGE UP (ref 1–3.3)
LYMPHOCYTES # BLD AUTO: 13 % — SIGNIFICANT CHANGE UP (ref 13–44)
MCHC RBC-ENTMCNC: 32.7 GM/DL — SIGNIFICANT CHANGE UP (ref 32–36)
MCHC RBC-ENTMCNC: 33.2 PG — SIGNIFICANT CHANGE UP (ref 27–34)
MCV RBC AUTO: 101.8 FL — HIGH (ref 80–100)
MONOCYTES # BLD AUTO: 1.23 K/UL — HIGH (ref 0–0.9)
MONOCYTES NFR BLD AUTO: 12.3 % — SIGNIFICANT CHANGE UP (ref 2–14)
NEUTROPHILS # BLD AUTO: 7.37 K/UL — SIGNIFICANT CHANGE UP (ref 1.8–7.4)
NEUTROPHILS NFR BLD AUTO: 74 % — SIGNIFICANT CHANGE UP (ref 43–77)
NITRITE UR-MCNC: NEGATIVE — SIGNIFICANT CHANGE UP
NRBC # BLD: 0 /100 WBCS — SIGNIFICANT CHANGE UP (ref 0–0)
PH UR: 6 — SIGNIFICANT CHANGE UP (ref 5–8)
PLATELET # BLD AUTO: 217 K/UL — SIGNIFICANT CHANGE UP (ref 150–400)
POTASSIUM SERPL-MCNC: 4.9 MMOL/L — SIGNIFICANT CHANGE UP (ref 3.5–5.3)
POTASSIUM SERPL-SCNC: 4.9 MMOL/L — SIGNIFICANT CHANGE UP (ref 3.5–5.3)
PROT UR-MCNC: 30 MG/DL
RBC # BLD: 3.94 M/UL — LOW (ref 4.2–5.8)
RBC # FLD: 13.8 % — SIGNIFICANT CHANGE UP (ref 10.3–14.5)
SODIUM SERPL-SCNC: 136 MMOL/L — SIGNIFICANT CHANGE UP (ref 135–145)
SP GR SPEC: 1.01 — SIGNIFICANT CHANGE UP (ref 1–1.03)
UROBILINOGEN FLD QL: 0.2 MG/DL — SIGNIFICANT CHANGE UP (ref 0.2–1)
WBC # BLD: 9.96 K/UL — SIGNIFICANT CHANGE UP (ref 3.8–10.5)
WBC # FLD AUTO: 9.96 K/UL — SIGNIFICANT CHANGE UP (ref 3.8–10.5)

## 2023-07-30 PROCEDURE — 99232 SBSQ HOSP IP/OBS MODERATE 35: CPT

## 2023-07-30 RX ORDER — CEFUROXIME AXETIL 250 MG
500 TABLET ORAL EVERY 12 HOURS
Refills: 0 | Status: COMPLETED | OUTPATIENT
Start: 2023-07-30 | End: 2023-08-04

## 2023-07-30 RX ADMIN — AMIODARONE HYDROCHLORIDE 200 MILLIGRAM(S): 400 TABLET ORAL at 05:27

## 2023-07-30 RX ADMIN — APIXABAN 5 MILLIGRAM(S): 2.5 TABLET, FILM COATED ORAL at 05:27

## 2023-07-30 RX ADMIN — ZINC OXIDE 1 APPLICATION(S): 200 OINTMENT TOPICAL at 06:28

## 2023-07-30 RX ADMIN — Medication 12.5 MILLIGRAM(S): at 05:26

## 2023-07-30 RX ADMIN — Medication 9 MILLIGRAM(S): at 22:21

## 2023-07-30 RX ADMIN — Medication 500 MILLIGRAM(S): at 19:51

## 2023-07-30 RX ADMIN — APIXABAN 5 MILLIGRAM(S): 2.5 TABLET, FILM COATED ORAL at 17:39

## 2023-07-30 RX ADMIN — MIDODRINE HYDROCHLORIDE 5 MILLIGRAM(S): 2.5 TABLET ORAL at 13:15

## 2023-07-30 NOTE — PROGRESS NOTE ADULT - TIME BILLING
Reviewing chart notes and data, face to face time examining and counseling the patient, discussing care plan with patient and his daughter, communicating with PM&R Dr. German and nursing staff at [facility]
medical complexity
Reviewing chart notes and data, face to face time examining and counseling the patient, communicating with PM&R team and SW/CM at [facility]
medical complexity

## 2023-07-30 NOTE — PROGRESS NOTE ADULT - SUBJECTIVE AND OBJECTIVE BOX
Pt. seen and examined at bedside.  No overnight events.      REVIEW OF SYSTEMS  Constitutional - No fever,  No fatigue  Neurological - No headaches, ++ loss of strength  Musculoskeletal - No joint pain, No joint swelling, No muscle pain    VITALS  T(C): 36.6 (07-29-23 @ 22:20), Max: 36.9 (07-29-23 @ 09:15)  HR: 99 (07-30-23 @ 05:44) (74 - 99)  BP: 153/80 (07-30-23 @ 05:44) (109/68 - 153/80)  RR: 16 (07-29-23 @ 22:20) (16 - 16)  SpO2: 95% (07-29-23 @ 22:20) (94% - 98%)  Wt(kg): --       MEDICATIONS   acetaminophen     Tablet .. 650 milliGRAM(s) every 6 hours PRN  aMIOdarone    Tablet 200 milliGRAM(s) daily  ammonium lactate 12% Lotion 1 Application(s) two times a day  apixaban 5 milliGRAM(s) two times a day  melatonin 9 milliGRAM(s) at bedtime  metoprolol succinate ER 12.5 milliGRAM(s) daily  midodrine. 5 milliGRAM(s) <User Schedule>  neomycin/bacitracin/polymyxin Topical Ointment 1 Application(s) two times a day  polyethylene glycol 3350 17 Gram(s) daily PRN  senna 2 Tablet(s) at bedtime  zinc oxide 40% Paste 1 Application(s) two times a day      RECENT LABS/IMAGING                        ---------  PHYSICAL EXAM  Constitutional - NAD, Comfortable  Pulm - Breathing comfortably, No wheezing  Abd - Soft, NTND  Extremities - No edema, No calf tenderness  Neurologic Exam -                    Cognitive - Awake, Alert but confused     Communication - Fluent     Motor - No focal deficits     Sensory - Intact to LT  Psychiatric - Mood WNL, Affect WNL    ASSESSMENT/PLAN  83y Male with functional deficits after SDH/SAH.  Continue current medical management  Pain - Tylenol PRN  DVT PPX - apixaban 5 milliGRAM(s) two times a day  Active issues - none - repeat NCHCT - no interval change.  Continue 3hrs a day of comprehensive rehab program.

## 2023-07-31 LAB
ANION GAP SERPL CALC-SCNC: 2 MMOL/L — LOW (ref 5–17)
BUN SERPL-MCNC: 43 MG/DL — HIGH (ref 7–23)
CALCIUM SERPL-MCNC: 9.3 MG/DL — SIGNIFICANT CHANGE UP (ref 8.4–10.5)
CHLORIDE SERPL-SCNC: 100 MMOL/L — SIGNIFICANT CHANGE UP (ref 96–108)
CO2 SERPL-SCNC: 36 MMOL/L — HIGH (ref 22–31)
CREAT SERPL-MCNC: 1.56 MG/DL — HIGH (ref 0.5–1.3)
EGFR: 44 ML/MIN/1.73M2 — LOW
GLUCOSE SERPL-MCNC: 98 MG/DL — SIGNIFICANT CHANGE UP (ref 70–99)
HCT VFR BLD CALC: 40.3 % — SIGNIFICANT CHANGE UP (ref 39–50)
HGB BLD-MCNC: 13.1 G/DL — SIGNIFICANT CHANGE UP (ref 13–17)
MAGNESIUM SERPL-MCNC: 2.1 MG/DL — SIGNIFICANT CHANGE UP (ref 1.6–2.6)
MCHC RBC-ENTMCNC: 32.5 GM/DL — SIGNIFICANT CHANGE UP (ref 32–36)
MCHC RBC-ENTMCNC: 32.8 PG — SIGNIFICANT CHANGE UP (ref 27–34)
MCV RBC AUTO: 100.8 FL — HIGH (ref 80–100)
NRBC # BLD: 0 /100 WBCS — SIGNIFICANT CHANGE UP (ref 0–0)
PLATELET # BLD AUTO: 218 K/UL — SIGNIFICANT CHANGE UP (ref 150–400)
POTASSIUM SERPL-MCNC: 4.5 MMOL/L — SIGNIFICANT CHANGE UP (ref 3.5–5.3)
POTASSIUM SERPL-SCNC: 4.5 MMOL/L — SIGNIFICANT CHANGE UP (ref 3.5–5.3)
RBC # BLD: 4 M/UL — LOW (ref 4.2–5.8)
RBC # FLD: 13.6 % — SIGNIFICANT CHANGE UP (ref 10.3–14.5)
SODIUM SERPL-SCNC: 138 MMOL/L — SIGNIFICANT CHANGE UP (ref 135–145)
WBC # BLD: 8.69 K/UL — SIGNIFICANT CHANGE UP (ref 3.8–10.5)
WBC # FLD AUTO: 8.69 K/UL — SIGNIFICANT CHANGE UP (ref 3.8–10.5)

## 2023-07-31 PROCEDURE — 99232 SBSQ HOSP IP/OBS MODERATE 35: CPT

## 2023-07-31 RX ORDER — MIRTAZAPINE 45 MG/1
7.5 TABLET, ORALLY DISINTEGRATING ORAL AT BEDTIME
Refills: 0 | Status: DISCONTINUED | OUTPATIENT
Start: 2023-07-31 | End: 2023-08-09

## 2023-07-31 RX ADMIN — AMIODARONE HYDROCHLORIDE 200 MILLIGRAM(S): 400 TABLET ORAL at 05:24

## 2023-07-31 RX ADMIN — MIDODRINE HYDROCHLORIDE 5 MILLIGRAM(S): 2.5 TABLET ORAL at 05:23

## 2023-07-31 RX ADMIN — ZINC OXIDE 1 APPLICATION(S): 200 OINTMENT TOPICAL at 17:42

## 2023-07-31 RX ADMIN — Medication 500 MILLIGRAM(S): at 05:24

## 2023-07-31 RX ADMIN — Medication 500 MILLIGRAM(S): at 17:41

## 2023-07-31 RX ADMIN — Medication 1 APPLICATION(S): at 17:42

## 2023-07-31 RX ADMIN — BACITRACIN ZINC NEOMYCIN SULFATE POLYMYXIN B SULFATE 1 APPLICATION(S): 400; 3.5; 5 OINTMENT TOPICAL at 17:43

## 2023-07-31 RX ADMIN — Medication 12.5 MILLIGRAM(S): at 05:23

## 2023-07-31 RX ADMIN — Medication 9 MILLIGRAM(S): at 20:50

## 2023-07-31 RX ADMIN — MIDODRINE HYDROCHLORIDE 5 MILLIGRAM(S): 2.5 TABLET ORAL at 13:38

## 2023-07-31 RX ADMIN — APIXABAN 5 MILLIGRAM(S): 2.5 TABLET, FILM COATED ORAL at 05:25

## 2023-07-31 RX ADMIN — APIXABAN 5 MILLIGRAM(S): 2.5 TABLET, FILM COATED ORAL at 17:42

## 2023-07-31 NOTE — PROGRESS NOTE ADULT - ASSESSMENT
84 yo with PMH of A fib, HFrEF, HTN, and hx of AVR presented to [facility] on 7/6 after fall, found to have SDH/SAH. Hospital Course complicated by A fib RVR and COVID (+). Patient now admitted to [facility] for a multidisciplinary rehab program.     #BPH  #Urinary Retention   #Pyuria  - cont with ceftin BID day 2 today  - f/u urine cx  - held Flomax 0.4 mg nightly 7/20 for OH. resume as able    #SAH/SDH  - Eliquis (for AF)   - Aspiration and fall precautions    #HFrEF  #Chronic A fib  #HTN  #orthostatic hypotension  - EF 30%  - Amiodarone 200mg daily, Metoprlol 12.5mg daily  - s/p digoxin 125mcg daily x 2 two doses   - Midodrine 5 mg BID  - held Farxiga for soft BP   - not a candidate for ACE/ARBS/ARNI due to OH    #CKD3  - Unknown baseline  - Avoid nephrotoxins  - Monitor    #COVID-19 Infection  - PCR positive 7/7, repeat on 7/11 negative  - Monitor     #DVT prophylaxis: eliquis

## 2023-07-31 NOTE — PROGRESS NOTE ADULT - SUBJECTIVE AND OBJECTIVE BOX
SUBJECTIVE/ROS: Patient evaluated while sitting in the chair. He states he is feeling improved since yesterday and denies any dysuria symptoms. Daughter was sitting at bedside and provided updates.    Patient denies chest pain, fever, chills, nausea, vomiting, abdominal pain, headache, or BLE pain.     HPI:  This is an 83 year old male with PMH of A fib, HFrEF, HTN, and hx of AVR who presented to the [facility] on 7/6 after fall. He was found to have a subdural/subarachnoid hemorrhage. Hospital course complicated by A fib with RVR s/p amiodarone and metoprolol. Per cardio recs, recommended by EPS and pt a good candidate for Watchman implant in future and will require outpatient f/u. Course further complicated by COVID (+) on 7/6. Repeat PCR on 7/11 negative. Patient evaluated by PT/OT and recommended for acute inpatient rehab. Patient is medically stable for DC to [facility] on 7/12.       Vital Signs Last 24 Hrs  T(C): 36.8 (31 Jul 2023 08:34), Max: 36.8 (31 Jul 2023 08:34)  T(F): 98.3 (31 Jul 2023 08:34), Max: 98.3 (31 Jul 2023 08:34)  HR: 82 (31 Jul 2023 08:34) (69 - 96)  BP: 101/59 (31 Jul 2023 08:34) (101/59 - 126/78)  BP(mean): --  RR: 16 (31 Jul 2023 13:32) (16 - 16)  SpO2: 98% (31 Jul 2023 13:32) (96% - 98%)    Parameters below as of 31 Jul 2023 13:32  Patient On (Oxygen Delivery Method): room air        PHYSICAL EXAM  Constitutional - NAD, Comfortable  HEENT - NCAT, EOMI  Neck - Supple, No limited ROM  Chest - CTA bilaterally  Cardiovascular - RRR, S1S2  Abdomen -BS+, Soft, NTND  Extremities - No C/C/E, No calf tenderness   Neurologic Exam -aox3, mae4+/5    RECENT LABS:                          13.1   8.69  )-----------( 218      ( 31 Jul 2023 05:30 )             40.3     07-31    138  |  100  |  43<H>  ----------------------------<  98  4.5   |  36<H>  |  1.56<H>    Ca    9.3      31 Jul 2023 05:30  Mg     2.1     07-31          Urinalysis Basic - ( 31 Jul 2023 05:30 )    Color: x / Appearance: x / SG: x / pH: x  Gluc: 98 mg/dL / Ketone: x  / Bili: x / Urobili: x   Blood: x / Protein: x / Nitrite: x   Leuk Esterase: x / RBC: x / WBC x   Sq Epi: x / Non Sq Epi: x / Bacteria: x        MEDICATIONS  (STANDING):  aMIOdarone    Tablet 200 milliGRAM(s) Oral daily  ammonium lactate 12% Lotion 1 Application(s) Topical two times a day  apixaban 5 milliGRAM(s) Oral two times a day  cefuroxime   Tablet 500 milliGRAM(s) Oral every 12 hours  melatonin 9 milliGRAM(s) Oral at bedtime  metoprolol succinate ER 12.5 milliGRAM(s) Oral daily  midodrine. 5 milliGRAM(s) Oral <User Schedule>  mirtazapine 7.5 milliGRAM(s) Oral at bedtime  neomycin/bacitracin/polymyxin Topical Ointment 1 Application(s) Topical two times a day  senna 2 Tablet(s) Oral at bedtime  zinc oxide 40% Paste 1 Application(s) Topical two times a day    MEDICATIONS  (PRN):  acetaminophen     Tablet .. 650 milliGRAM(s) Oral every 6 hours PRN Mild Pain (1 - 3)  polyethylene glycol 3350 17 Gram(s) Oral daily PRN Constipation

## 2023-07-31 NOTE — PROGRESS NOTE ADULT - ASSESSMENT
Assessment/Plan:  CHADD VALDIVIA is a 83y with PMH of A fib, HFrEF, HTN, and hx of AVR who presented to the [facility] on 7/6 after fall, found to have SDH/SAH.  Hospital Course complicated by A fib RVR and COVID (+). Patient now admitted for a multidisciplinary rehab program. 07-12-23 @ 15:20    #SAH/SDH  (Left frontal SDH) --7/6  -CTH 7/13--No interval change --Trace subdural hemorrhage along the left side of the posterior falx measuring 2 to 3 mm.  - Keppra completed on 7/23 per [place] chart review  - Neurologist-Dr Osvaldo Santacruz approved to restarting apixiban 7/27  - Continue comprehensive rehab program of PT/OT/SLP - 3 hours a day, 5 days a week.   - Repeat CTH on 7/20 and 7/29 show stability     #A fib  - Course c/b A fib with RVR  - Echocardiogram EF 55-60% 7/25  - Will require outpatient cardio f/u, patient good candidate for Watchman implant in future  ( Dr Jonas, cardiologist at [facility])   - Continue Amiodarone  - Continue Metoprolol ER 12.5 daily  -Continue Eliquis 5mg BID 7/27 - head CT stable 7/29   -Stop digoxin as per outpatient cardiology due to starting ELIQUIS     #COVID (+) -resolved   - PCR positive 7/7, repeat on 7/11 negative    #Orthostatic hypotension  -continue midodrine 5 BID while on lopressor   - Continue lopressor 12.5mg BID  -Monitor OH daily     #Pain  - continue Tylenol     #Insomnia/mood/depression  - Continue Melatonin  9 mg qhs  -Restart Mirtazapine 7.5mg at bedtime for mood/sleep and increase appetite  7/31 (stopped on 7/29 due to lethargy initially thought to be med induced but found to have UTI -now improved)    #GI / Bowel  - Senna qHS  - Miralax PRN Daily  - GI ppx: Protonix    #/Bladder/UTI  BPH/Urinary retention  - Flomax DCd 7/20 due to possible OH contribution  --F/u with his urologist  -UTI with +UA on 7/30, pending Ucx  -Continue Ceftin 500mg BID for 5 days     #Skin / Pressure injury  -Skin tear x 2 areas right forearm. continue  topical antibiotic cream (neomycin) and  protective dressing   - soft heel protectors    #Diet/Dysphagia:  - Diet Consistency: Regular  - Aspiration Precautions  - on SLP f/u  - Nutrition f/u ongoing    #DVT prophylaxis:   - SCD    # Health maintenance  - Vitamin B12 and Folate labs - normal   --------------------------------------------  IDT conference on 7/31  TDD: 8/3 to home  Barriers: Forgetful, poor balance.  Social Work: Lives alone in  with 5-6 ISABEL with 1 HR and 14 STI. Has chair lift. Gets HHA 2-3 hrs 2x per week. Daughter lives close by.  OT: SV for adls and transfers   PT: SV for transfers and ambulation, CG during turns at times   SLP: Regular with TLs. Mild cognitive deficits. Improvements in recall, safety, and fall prevention.   Family training completed.   --------------------------------------------  Outpatient Follow-up:  Specialty and Name of physician  Nephrology  Anshu Martino MD  [address] [place] [address], [address]  [facility] [address]  [phone]    Neurosurgery  [address] [facility] [address]  [place], [place] [id number] [phone]    Electrophysiology  Jason Ortega MD  [address] [place] [address] [phone]    Eliseo Braxton MD  [address] [facility] [address]  [phone]    Neurology  Dr Osvaldo Watkins  Appointment  8/16/23 at 3.40 pm at [address] [place] [address]

## 2023-07-31 NOTE — PROGRESS NOTE ADULT - SUBJECTIVE AND OBJECTIVE BOX
Patient is a 83y old  Male who presents with a chief complaint of s/p subdural/subarachnoid hemorrhage (30 Jul 2023 08:57)      Subjective and overnight events:  Patient seen and examined at bedside. + urinary hesitancy.  no fever, chills, sob, cp, abd pain. no headache, dizziness.     ALLERGIES:  No Known Allergies    MEDICATIONS  (STANDING):  aMIOdarone    Tablet 200 milliGRAM(s) Oral daily  ammonium lactate 12% Lotion 1 Application(s) Topical two times a day  apixaban 5 milliGRAM(s) Oral two times a day  cefuroxime   Tablet 500 milliGRAM(s) Oral every 12 hours  melatonin 9 milliGRAM(s) Oral at bedtime  metoprolol succinate ER 12.5 milliGRAM(s) Oral daily  midodrine. 5 milliGRAM(s) Oral <User Schedule>  mirtazapine 7.5 milliGRAM(s) Oral at bedtime  neomycin/bacitracin/polymyxin Topical Ointment 1 Application(s) Topical two times a day  senna 2 Tablet(s) Oral at bedtime  zinc oxide 40% Paste 1 Application(s) Topical two times a day    MEDICATIONS  (PRN):  acetaminophen     Tablet .. 650 milliGRAM(s) Oral every 6 hours PRN Mild Pain (1 - 3)  polyethylene glycol 3350 17 Gram(s) Oral daily PRN Constipation    Vital Signs Last 24 Hrs  T(F): 98.3 (31 Jul 2023 08:34), Max: 98.3 (31 Jul 2023 08:34)  HR: 82 (31 Jul 2023 08:34) (69 - 96)  BP: 101/59 (31 Jul 2023 08:34) (101/59 - 126/78)  RR: 16 (31 Jul 2023 13:32) (16 - 16)  SpO2: 98% (31 Jul 2023 13:32) (96% - 98%)  I&O's Summary    PHYSICAL EXAM:  General: NAD, A/O x 3  ENT: MMM  Neck: Supple, No JVD  Lungs: Clear to auscultation bilaterally  Cardio: RRR, S1/S2, No murmurs  Abdomen: Soft, Nontender, Nondistended; Bowel sounds present  Extremities: No calf tenderness, No pitting edema    LABS:                        13.1   8.69  )-----------( 218      ( 31 Jul 2023 05:30 )             40.3     07-31    138  |  100  |  43  ----------------------------<  98  4.5   |  36  |  1.56    Ca    9.3      31 Jul 2023 05:30  Mg     2.1     07-31          Urinalysis Basic - ( 31 Jul 2023 05:30 )    Color: x / Appearance: x / SG: x / pH: x  Gluc: 98 mg/dL / Ketone: x  / Bili: x / Urobili: x   Blood: x / Protein: x / Nitrite: x   Leuk Esterase: x / RBC: x / WBC x   Sq Epi: x / Non Sq Epi: x / Bacteria: x            RADIOLOGY & ADDITIONAL TESTS:    Care Discussed with Consultants/Other Providers:

## 2023-08-01 PROCEDURE — 99232 SBSQ HOSP IP/OBS MODERATE 35: CPT

## 2023-08-01 PROCEDURE — 93010 ELECTROCARDIOGRAM REPORT: CPT

## 2023-08-01 RX ORDER — MIDODRINE HYDROCHLORIDE 2.5 MG/1
5 TABLET ORAL
Refills: 0 | Status: DISCONTINUED | OUTPATIENT
Start: 2023-08-01 | End: 2023-08-02

## 2023-08-01 RX ORDER — MIDODRINE HYDROCHLORIDE 2.5 MG/1
5 TABLET ORAL ONCE
Refills: 0 | Status: COMPLETED | OUTPATIENT
Start: 2023-08-01 | End: 2023-08-01

## 2023-08-01 RX ADMIN — MIDODRINE HYDROCHLORIDE 5 MILLIGRAM(S): 2.5 TABLET ORAL at 09:59

## 2023-08-01 RX ADMIN — APIXABAN 5 MILLIGRAM(S): 2.5 TABLET, FILM COATED ORAL at 05:28

## 2023-08-01 RX ADMIN — AMIODARONE HYDROCHLORIDE 200 MILLIGRAM(S): 400 TABLET ORAL at 05:28

## 2023-08-01 RX ADMIN — Medication 1 APPLICATION(S): at 17:13

## 2023-08-01 RX ADMIN — MIRTAZAPINE 7.5 MILLIGRAM(S): 45 TABLET, ORALLY DISINTEGRATING ORAL at 20:41

## 2023-08-01 RX ADMIN — ZINC OXIDE 1 APPLICATION(S): 200 OINTMENT TOPICAL at 17:12

## 2023-08-01 RX ADMIN — Medication 500 MILLIGRAM(S): at 17:13

## 2023-08-01 RX ADMIN — BACITRACIN ZINC NEOMYCIN SULFATE POLYMYXIN B SULFATE 1 APPLICATION(S): 400; 3.5; 5 OINTMENT TOPICAL at 17:13

## 2023-08-01 RX ADMIN — Medication 500 MILLIGRAM(S): at 05:28

## 2023-08-01 RX ADMIN — Medication 12.5 MILLIGRAM(S): at 05:28

## 2023-08-01 RX ADMIN — APIXABAN 5 MILLIGRAM(S): 2.5 TABLET, FILM COATED ORAL at 17:13

## 2023-08-01 RX ADMIN — SENNA PLUS 2 TABLET(S): 8.6 TABLET ORAL at 20:40

## 2023-08-01 RX ADMIN — MIDODRINE HYDROCHLORIDE 5 MILLIGRAM(S): 2.5 TABLET ORAL at 12:56

## 2023-08-01 NOTE — PROGRESS NOTE ADULT - ASSESSMENT
82 yo with PMH of A fib, HFrEF, HTN, and hx of AVR presented to [facility] on 7/6 after fall, found to have SDH/SAH. Hospital Course complicated by A fib RVR and COVID (+). Patient now admitted to [facility] for a multidisciplinary rehab program.     #BPH  #Urinary Retention   #Pyuria  - cont with ceftin BID day 3 today, total 5 days  - f/u urine cx  - held Flomax 0.4 mg     #SAH/SDH  - Eliquis (for AF)   - Aspiration and fall precautions    #HFrEF  #Chronic A fib  #HTN  #orthostatic hypotension  - EF 30%  - Amiodarone 200mg daily  - Toprol 12.5mg daily on hold due to persistent orthostasis   - Midodrine 5 mg BID  - held Farxiga for soft BP   - not a candidate for ACE/ARBS/ARNI due to OH    #CKD3  - Unknown baseline  - Avoid nephrotoxins  - Monitor    #COVID-19 Infection  - PCR positive 7/7, repeat on 7/11 negative  - Monitor     #DVT prophylaxis: eliquis

## 2023-08-01 NOTE — PROGRESS NOTE ADULT - SUBJECTIVE AND OBJECTIVE BOX
SUBJECTIVE/ROS: Patient evaluated while sitting in the chair. He states he is feeling well today. He as noted to be orthostatic with some dizziness during therapy and recovered well when seated. Patient denies chest pain, fever, chills, nausea, vomiting, abdominal pain, headache, or BLE pain.     HPI:  This is an 83 year old male with PMH of A fib, HFrEF, HTN, and hx of AVR who presented to the [facility] on 7/6 after fall. He was found to have a subdural/subarachnoid hemorrhage. Hospital course complicated by A fib with RVR s/p amiodarone and metoprolol. Per cardio recs, recommended by EPS and pt a good candidate for Watchman implant in future and will require outpatient f/u. Course further complicated by COVID (+) on 7/6. Repeat PCR on 7/11 negative. Patient evaluated by PT/OT and recommended for acute inpatient rehab. Patient is medically stable for DC to [facility] on 7/12.       Vital Signs Last 24 Hrs  T(C): 36.8 (01 Aug 2023 08:27), Max: 36.8 (01 Aug 2023 08:27)  T(F): 98.2 (01 Aug 2023 08:27), Max: 98.2 (01 Aug 2023 08:27)  HR: 80 (01 Aug 2023 09:52) (71 - 89)  BP: 94/56 (01 Aug 2023 09:52) (94/56 - 143/73)  BP(mean): --  RR: 16 (01 Aug 2023 09:52) (16 - 16)  SpO2: 96% (01 Aug 2023 09:52) (96% - 98%)    Parameters below as of 01 Aug 2023 09:52  Patient On (Oxygen Delivery Method): room air          PHYSICAL EXAM  Constitutional - NAD, Comfortable  HEENT - NCAT, EOMI  Neck - Supple, No limited ROM  Chest - CTA bilaterally  Cardiovascular - RRR, S1S2  Abdomen -BS+, Soft, NTND  Extremities - No C/C/E, No calf tenderness   Neurologic Exam -aox3, mae4+/5    RECENT LABS:                          13.1   8.69  )-----------( 218      ( 31 Jul 2023 05:30 )             40.3     07-31    138  |  100  |  43<H>  ----------------------------<  98  4.5   |  36<H>  |  1.56<H>    Ca    9.3      31 Jul 2023 05:30  Mg     2.1     07-31          Urinalysis Basic - ( 31 Jul 2023 05:30 )    Color: x / Appearance: x / SG: x / pH: x  Gluc: 98 mg/dL / Ketone: x  / Bili: x / Urobili: x   Blood: x / Protein: x / Nitrite: x   Leuk Esterase: x / RBC: x / WBC x   Sq Epi: x / Non Sq Epi: x / Bacteria: x      MEDICATIONS  (STANDING):  aMIOdarone    Tablet 200 milliGRAM(s) Oral daily  ammonium lactate 12% Lotion 1 Application(s) Topical two times a day  apixaban 5 milliGRAM(s) Oral two times a day  cefuroxime   Tablet 500 milliGRAM(s) Oral every 12 hours  melatonin 9 milliGRAM(s) Oral at bedtime  midodrine. 5 milliGRAM(s) Oral <User Schedule>  mirtazapine 7.5 milliGRAM(s) Oral at bedtime  neomycin/bacitracin/polymyxin Topical Ointment 1 Application(s) Topical two times a day  senna 2 Tablet(s) Oral at bedtime  zinc oxide 40% Paste 1 Application(s) Topical two times a day    MEDICATIONS  (PRN):  acetaminophen     Tablet .. 650 milliGRAM(s) Oral every 6 hours PRN Mild Pain (1 - 3)  polyethylene glycol 3350 17 Gram(s) Oral daily PRN Constipation

## 2023-08-01 NOTE — PROGRESS NOTE ADULT - ASSESSMENT
Assessment/Plan:  CHADD VALDIVIA is a 83y with PMH of A fib, HFrEF, HTN, and hx of AVR who presented to the [facility] on 7/6 after fall, found to have SDH/SAH.  Hospital Course complicated by A fib RVR and COVID (+). Patient now admitted for a multidisciplinary rehab program. 07-12-23 @ 15:20    #SAH/SDH  (Left frontal SDH) --7/6  -CTH 7/13--No interval change --Trace subdural hemorrhage along the left side of the posterior falx measuring 2 to 3 mm.  - Keppra completed on 7/23 per [place] chart review  - Neurologist-Dr Osvaldo Santacruz approved restarting apixiban 7/27  - Continue comprehensive rehab program of PT/OT/SLP - 3 hours a day, 5 days a week.   - Repeat CTH on 7/20 and 7/29 show stability     #A fib  - Course c/b A fib with RVR  - Echocardiogram EF 55-60% 7/25  - Will require outpatient cardio f/u, patient good candidate for Watchman implant in future  ( Dr Jonas, cardiologist at [facility])   - Continue Amiodarone  - Metoprolol ER 12.5 discontinued 8/1 due to OH  -Continue Eliquis 5mg BID 7/27 - head CT stable 7/29   -Stop digoxin as per outpatient cardiology due to starting ELIQUIS     #COVID (+) -resolved   - PCR positive 7/7, repeat on 7/11 negative    #Orthostatic hypotension  -continue midodrine 5 BID and reassess need after lopressor stopped 8/1  - Stop lopressor 12.5mg BID 8/1  -Monitor OH daily     #Pain  - continue Tylenol     #Insomnia/mood/depression  - Continue Melatonin  9 mg qhs  -Continue Mirtazapine 7.5mg at bedtime for mood/sleep and increase appetite  7/31 (stopped on 7/29 due to lethargy initially thought to be med induced but found to have UTI -now improved)    #GI / Bowel  - Senna qHS  - Miralax PRN Daily  - GI ppx: Protonix    #/Bladder/UTI  BPH/Urinary retention  - Flomax DCd 7/20 due to possible OH contribution  --F/u with his urologist  -UTI with +UA on 7/30, pending Ucx  -Continue Ceftin 500mg BID for 5 days     #Skin / Pressure injury  -Skin tear x 2 areas right forearm. continue  topical antibiotic cream (neomycin) and  protective dressing   - soft heel protectors    #Diet/Dysphagia:  - Diet Consistency: Regular  - Aspiration Precautions  - on SLP f/u  - Nutrition f/u ongoing    #DVT prophylaxis:   - SCD    # Health maintenance  - Vitamin B12 and Folate labs - normal   --------------------------------------------  IDT conference on 7/31  TDD: 8/3 to home  Barriers: Forgetful, poor balance.  Social Work: Lives alone in  with 5-6 ISABEL with 1 HR and 14 STI. Has chair lift. Gets HHA 2-3 hrs 2x per week. Daughter lives close by.  OT: SV for adls and transfers   PT: SV for transfers and ambulation, CG during turns at times   SLP: Regular with TLs. Mild cognitive deficits. Improvements in recall, safety, and fall prevention.   Family training completed.   --------------------------------------------  Outpatient Follow-up:  Specialty and Name of physician  Nephrology  Anshu Martino MD  [address] [place] [address], [address]  [facility] [address]  [phone]    Neurosurgery  [address] [facility], [address]  [place] [address] [phone]    Electrophysiology  Jason Ortega MD  [address] [place] [address] [phone]    Eliseo Braxton MD  [address] [facility] [address]  [phone]    Neurology  Dr Osvaldo Watkins  Appointment  8/16/23 at 3.40 pm at [address] [place] [address]

## 2023-08-01 NOTE — PROGRESS NOTE ADULT - SUBJECTIVE AND OBJECTIVE BOX
Patient is a 83y old  Male who presents with a chief complaint of s/p subdural/subarachnoid hemorrhage (01 Aug 2023 10:37)      Subjective and overnight events:  Patient seen and examined at bedside. reports persistent urinary hesitancy. +orthostatic dizziness. no fever, chills, sob, cp, abd pain.    ALLERGIES:  No Known Allergies    MEDICATIONS  (STANDING):  aMIOdarone    Tablet 200 milliGRAM(s) Oral daily  ammonium lactate 12% Lotion 1 Application(s) Topical two times a day  apixaban 5 milliGRAM(s) Oral two times a day  cefuroxime   Tablet 500 milliGRAM(s) Oral every 12 hours  melatonin 9 milliGRAM(s) Oral at bedtime  midodrine. 5 milliGRAM(s) Oral <User Schedule>  mirtazapine 7.5 milliGRAM(s) Oral at bedtime  neomycin/bacitracin/polymyxin Topical Ointment 1 Application(s) Topical two times a day  senna 2 Tablet(s) Oral at bedtime  zinc oxide 40% Paste 1 Application(s) Topical two times a day    MEDICATIONS  (PRN):  acetaminophen     Tablet .. 650 milliGRAM(s) Oral every 6 hours PRN Mild Pain (1 - 3)  polyethylene glycol 3350 17 Gram(s) Oral daily PRN Constipation    Vital Signs Last 24 Hrs  T(F): 98.2 (01 Aug 2023 08:27), Max: 98.2 (01 Aug 2023 08:27)  HR: 80 (01 Aug 2023 09:52) (71 - 89)  BP: 94/56 (01 Aug 2023 09:52) (94/56 - 143/73)  RR: 16 (01 Aug 2023 09:52) (16 - 16)  SpO2: 96% (01 Aug 2023 09:52) (96% - 98%)  I&O's Summary    31 Jul 2023 07:01  -  01 Aug 2023 07:00  --------------------------------------------------------  IN: 0 mL / OUT: 50 mL / NET: -50 mL      PHYSICAL EXAM:  General: NAD, A/O x 3  ENT: MMM  Neck: Supple, No JVD  Lungs: Clear to auscultation bilaterally  Cardio: RRR, S1/S2, No murmurs  Abdomen: Soft, Nontender, Nondistended; Bowel sounds present  Extremities: No calf tenderness, No pitting edema    LABS:                        13.1   8.69  )-----------( 218      ( 31 Jul 2023 05:30 )             40.3     07-31    138  |  100  |  43  ----------------------------<  98  4.5   |  36  |  1.56    Ca    9.3      31 Jul 2023 05:30  Mg     2.1     07-31            Urinalysis Basic - ( 31 Jul 2023 05:30 )    Color: x / Appearance: x / SG: x / pH: x  Gluc: 98 mg/dL / Ketone: x  / Bili: x / Urobili: x   Blood: x / Protein: x / Nitrite: x   Leuk Esterase: x / RBC: x / WBC x   Sq Epi: x / Non Sq Epi: x / Bacteria: x            RADIOLOGY & ADDITIONAL TESTS:    Care Discussed with Consultants/Other Providers:

## 2023-08-02 LAB
CULTURE RESULTS: SIGNIFICANT CHANGE UP
SPECIMEN SOURCE: SIGNIFICANT CHANGE UP

## 2023-08-02 PROCEDURE — 99232 SBSQ HOSP IP/OBS MODERATE 35: CPT

## 2023-08-02 PROCEDURE — 99233 SBSQ HOSP IP/OBS HIGH 50: CPT

## 2023-08-02 RX ORDER — SODIUM CHLORIDE 9 MG/ML
1000 INJECTION INTRAMUSCULAR; INTRAVENOUS; SUBCUTANEOUS
Refills: 0 | Status: DISCONTINUED | OUTPATIENT
Start: 2023-08-02 | End: 2023-08-02

## 2023-08-02 RX ORDER — SODIUM CHLORIDE 9 MG/ML
1000 INJECTION INTRAMUSCULAR; INTRAVENOUS; SUBCUTANEOUS
Refills: 0 | Status: DISCONTINUED | OUTPATIENT
Start: 2023-08-02 | End: 2023-08-03

## 2023-08-02 RX ORDER — MIDODRINE HYDROCHLORIDE 2.5 MG/1
7.5 TABLET ORAL
Refills: 0 | Status: DISCONTINUED | OUTPATIENT
Start: 2023-08-02 | End: 2023-08-03

## 2023-08-02 RX ADMIN — BACITRACIN ZINC NEOMYCIN SULFATE POLYMYXIN B SULFATE 1 APPLICATION(S): 400; 3.5; 5 OINTMENT TOPICAL at 18:01

## 2023-08-02 RX ADMIN — MIRTAZAPINE 7.5 MILLIGRAM(S): 45 TABLET, ORALLY DISINTEGRATING ORAL at 21:23

## 2023-08-02 RX ADMIN — ZINC OXIDE 1 APPLICATION(S): 200 OINTMENT TOPICAL at 05:09

## 2023-08-02 RX ADMIN — BACITRACIN ZINC NEOMYCIN SULFATE POLYMYXIN B SULFATE 1 APPLICATION(S): 400; 3.5; 5 OINTMENT TOPICAL at 05:16

## 2023-08-02 RX ADMIN — Medication 1 APPLICATION(S): at 05:11

## 2023-08-02 RX ADMIN — MIDODRINE HYDROCHLORIDE 5 MILLIGRAM(S): 2.5 TABLET ORAL at 13:19

## 2023-08-02 RX ADMIN — SENNA PLUS 2 TABLET(S): 8.6 TABLET ORAL at 21:24

## 2023-08-02 RX ADMIN — SODIUM CHLORIDE 75 MILLILITER(S): 9 INJECTION INTRAMUSCULAR; INTRAVENOUS; SUBCUTANEOUS at 15:08

## 2023-08-02 RX ADMIN — ZINC OXIDE 1 APPLICATION(S): 200 OINTMENT TOPICAL at 18:01

## 2023-08-02 RX ADMIN — Medication 1 APPLICATION(S): at 18:01

## 2023-08-02 RX ADMIN — AMIODARONE HYDROCHLORIDE 200 MILLIGRAM(S): 400 TABLET ORAL at 05:07

## 2023-08-02 RX ADMIN — Medication 500 MILLIGRAM(S): at 05:07

## 2023-08-02 RX ADMIN — Medication 500 MILLIGRAM(S): at 18:00

## 2023-08-02 RX ADMIN — Medication 9 MILLIGRAM(S): at 21:24

## 2023-08-02 RX ADMIN — POLYETHYLENE GLYCOL 3350 17 GRAM(S): 17 POWDER, FOR SOLUTION ORAL at 18:00

## 2023-08-02 RX ADMIN — APIXABAN 5 MILLIGRAM(S): 2.5 TABLET, FILM COATED ORAL at 05:07

## 2023-08-02 RX ADMIN — MIDODRINE HYDROCHLORIDE 5 MILLIGRAM(S): 2.5 TABLET ORAL at 05:12

## 2023-08-02 RX ADMIN — APIXABAN 5 MILLIGRAM(S): 2.5 TABLET, FILM COATED ORAL at 18:01

## 2023-08-02 NOTE — PROGRESS NOTE ADULT - SUBJECTIVE AND OBJECTIVE BOX
Patient is a 83y old  Male who presents with a chief complaint of s/p subdural/subarachnoid hemorrhage (01 Aug 2023 11:01)      Subjective and overnight events:  Patient seen and examined at bedside. reports urinary hesitancy improved. +feeling more lightheaded today when trying to get up.   no fever, chills, sob, cp, abd pain.     ALLERGIES:  No Known Allergies    MEDICATIONS  (STANDING):  aMIOdarone    Tablet 200 milliGRAM(s) Oral daily  ammonium lactate 12% Lotion 1 Application(s) Topical two times a day  apixaban 5 milliGRAM(s) Oral two times a day  cefuroxime   Tablet 500 milliGRAM(s) Oral every 12 hours  melatonin 9 milliGRAM(s) Oral at bedtime  midodrine. 5 milliGRAM(s) Oral <User Schedule>  mirtazapine 7.5 milliGRAM(s) Oral at bedtime  neomycin/bacitracin/polymyxin Topical Ointment 1 Application(s) Topical two times a day  senna 2 Tablet(s) Oral at bedtime  sodium chloride 0.9%. 1000 milliLiter(s) (75 mL/Hr) IV Continuous <Continuous>  zinc oxide 40% Paste 1 Application(s) Topical two times a day    MEDICATIONS  (PRN):  acetaminophen     Tablet .. 650 milliGRAM(s) Oral every 6 hours PRN Mild Pain (1 - 3)  polyethylene glycol 3350 17 Gram(s) Oral daily PRN Constipation    Vital Signs Last 24 Hrs  T(F): 98.2 (02 Aug 2023 07:47), Max: 98.2 (02 Aug 2023 07:47)  HR: 79 (02 Aug 2023 07:47) (79 - 97)  BP: 114/78 (02 Aug 2023 07:47) (114/78 - 117/71)  RR: 16 (02 Aug 2023 07:47) (16 - 16)  SpO2: 96% (02 Aug 2023 07:47) (96% - 97%)  I&O's Summary    01 Aug 2023 07:01  -  02 Aug 2023 07:00  --------------------------------------------------------  IN: 0 mL / OUT: 60 mL / NET: -60 mL      PHYSICAL EXAM:  General: NAD, A/O x 3  ENT: MMM  Neck: Supple, No JVD  Lungs: Clear to auscultation bilaterally  Cardio: RRR, S1/S2, No murmurs  Abdomen: Soft, Nontender, Nondistended; Bowel sounds present  Extremities: No calf tenderness, No pitting edema    LABS:                        13.1   8.69  )-----------( 218      ( 31 Jul 2023 05:30 )             40.3     07-31    138  |  100  |  43  ----------------------------<  98  4.5   |  36  |  1.56    Ca    9.3      31 Jul 2023 05:30  Mg     2.1     07-31          Urinalysis Basic - ( 31 Jul 2023 05:30 )    Color: x / Appearance: x / SG: x / pH: x  Gluc: 98 mg/dL / Ketone: x  / Bili: x / Urobili: x   Blood: x / Protein: x / Nitrite: x   Leuk Esterase: x / RBC: x / WBC x   Sq Epi: x / Non Sq Epi: x / Bacteria: x        Culture - Urine (collected 30 Jul 2023 18:30)  Source: Clean Catch Clean Catch (Midstream)  Final Report (02 Aug 2023 10:28):    >=3 organisms. Probable collection contamination.          RADIOLOGY & ADDITIONAL TESTS:    Care Discussed with Consultants/Other Providers:

## 2023-08-02 NOTE — PROGRESS NOTE ADULT - SUBJECTIVE AND OBJECTIVE BOX
SUBJECTIVE/ROS: Patient evaluated while sitting in the chair. He was noted to have orthostatic vital signs in therapy again and states that he is still not feeling himself this morning. Patient denies chest pain, fever, chills, nausea, vomiting, abdominal pain, headache, or BLE pain.     HPI:  This is an 83 year old male with PMH of A fib, HFrEF, HTN, and hx of AVR who presented to the [facility] on 7/6 after fall. He was found to have a subdural/subarachnoid hemorrhage. Hospital course complicated by A fib with RVR s/p amiodarone and metoprolol. Per cardio recs, recommended by EPS and pt a good candidate for Watchman implant in future and will require outpatient f/u. Course further complicated by COVID (+) on 7/6. Repeat PCR on 7/11 negative. Patient evaluated by PT/OT and recommended for acute inpatient rehab. Patient is medically stable for [facility] to [facility] on 7/12.       Vital Signs Last 24 Hrs  T(C): 36.8 (02 Aug 2023 07:47), Max: 36.8 (02 Aug 2023 07:47)  T(F): 98.2 (02 Aug 2023 07:47), Max: 98.2 (02 Aug 2023 07:47)  HR: 79 (02 Aug 2023 07:47) (79 - 97)  BP: 114/78 (02 Aug 2023 07:47) (114/78 - 117/71)  BP(mean): --  RR: 16 (02 Aug 2023 07:47) (16 - 16)  SpO2: 96% (02 Aug 2023 07:47) (96% - 97%)    Parameters below as of 02 Aug 2023 07:47  Patient On (Oxygen Delivery Method): room air          PHYSICAL EXAM  Constitutional - NAD, Comfortable  HEENT - NCAT, EOMI  Neck - Supple, No limited ROM  Chest - CTA bilaterally  Cardiovascular - RRR, S1S2  Abdomen -BS+, Soft, NTND  Extremities - No C/C/E, No calf tenderness   Neurologic Exam -aox3, mae4+/5    RECENT LABS:                          13.1   8.69  )-----------( 218      ( 31 Jul 2023 05:30 )             40.3     07-31    138  |  100  |  43<H>  ----------------------------<  98  4.5   |  36<H>  |  1.56<H>    Ca    9.3      31 Jul 2023 05:30  Mg     2.1     07-31          Urinalysis Basic - ( 31 Jul 2023 05:30 )    Color: x / Appearance: x / SG: x / pH: x  Gluc: 98 mg/dL / Ketone: x  / Bili: x / Urobili: x   Blood: x / Protein: x / Nitrite: x   Leuk Esterase: x / RBC: x / WBC x   Sq Epi: x / Non Sq Epi: x / Bacteria: x      MEDICATIONS  (STANDING):  aMIOdarone    Tablet 200 milliGRAM(s) Oral daily  ammonium lactate 12% Lotion 1 Application(s) Topical two times a day  apixaban 5 milliGRAM(s) Oral two times a day  cefuroxime   Tablet 500 milliGRAM(s) Oral every 12 hours  melatonin 9 milliGRAM(s) Oral at bedtime  midodrine. 5 milliGRAM(s) Oral <User Schedule>  mirtazapine 7.5 milliGRAM(s) Oral at bedtime  neomycin/bacitracin/polymyxin Topical Ointment 1 Application(s) Topical two times a day  senna 2 Tablet(s) Oral at bedtime  sodium chloride 0.9%. 1000 milliLiter(s) (75 mL/Hr) IV Continuous <Continuous>  zinc oxide 40% Paste 1 Application(s) Topical two times a day    MEDICATIONS  (PRN):  acetaminophen     Tablet .. 650 milliGRAM(s) Oral every 6 hours PRN Mild Pain (1 - 3)  polyethylene glycol 3350 17 Gram(s) Oral daily PRN Constipation

## 2023-08-02 NOTE — PROGRESS NOTE ADULT - ASSESSMENT
Assessment/Plan:  CHADD VALDIVIA is a 83y with PMH of A fib, HFrEF, HTN, and hx of AVR who presented to the [facility] on 7/6 after fall, found to have SDH/SAH.  Hospital Course complicated by A fib RVR and COVID (+). Patient now admitted for a multidisciplinary rehab program. 07-12-23 @ 15:20    #SAH/SDH  (Left frontal SDH) --7/6  -CTH 7/13--No interval change --Trace subdural hemorrhage along the left side of the posterior falx measuring 2 to 3 mm.  - Keppra completed on 7/23 per [place] chart review  - Neurologist-Dr Osvaldo Santacruz approved restarting apixiban 7/27  - Continue comprehensive rehab program of PT/OT/SLP - 3 hours a day, 5 days a week.   - Repeat CTH on 7/20 and 7/29 show stability     #A fib  - Course c/b A fib with RVR  - Echocardiogram EF 55-60% 7/25  - Will require outpatient cardio f/u, patient good candidate for Watchman implant in future  ( Dr Jonas, cardiologist at [facility])   - Continue Amiodarone  - Metoprolol ER 12.5 discontinued 8/1 due to OH   -Continue standing midodrine 5mg BID  -Continue Eliquis 5mg BID 7/27 - head CT stable 7/29   -Stop digoxin as per outpatient cardiology due to starting ELIQUIS     #COVID (+) -resolved   - PCR positive 7/7, repeat on 7/11 negative    #Orthostatic hypotension  -continue midodrine 5 BID  8/1  - Stop lopressor 12.5mg BID 8/1  -NS at 75 ml/HR x 12 hours started 8/2 due to symptoms   -Monitor OH daily     #Pain  - continue Tylenol     #Insomnia/mood/depression  - Continue Melatonin  9 mg qhs  -Continue Mirtazapine 7.5mg at bedtime for mood/sleep and increase appetite  7/31    #GI / Bowel  - Senna qHS  - Miralax PRN Daily  - GI ppx: Protonix    #/Bladder/UTI  BPH/Urinary retention  - Flomax DCd 7/20 due to possible OH contribution  --F/u with his urologist  -UTI with +UA on 7/30, pending Ucx  -Continue Ceftin 500mg BID for 5 days     #Skin / Pressure injury  -Skin tear x 2 areas right forearm. continue  topical antibiotic cream (neomycin) and  protective dressing   - soft heel protectors    #Diet/Dysphagia:  - Diet Consistency: Regular  - Aspiration Precautions  - on SLP f/u  - Nutrition f/u ongoing    #DVT prophylaxis:   - SCD    # Health maintenance  - Vitamin B12 and Folate labs - normal   --------------------------------------------  IDT conference on 7/31  TDD: 8/4 to home (extended for monitoring of OH)  Barriers: Forgetful, poor balance.  Social Work: Lives alone in  with 5-6 ISABEL with 1 HR and 14 STI. Has chair lift. Gets HHA 2-3 hrs 2x per week. Daughter lives close by.  OT: SV for adls and transfers   PT: SV for transfers and ambulation, CG during turns at times   SLP: Regular with TLs. Mild cognitive deficits. Improvements in recall, safety, and fall prevention.   Family training completed.   --------------------------------------------  Outpatient Follow-up:  Specialty and Name of physician  Nephrology  Anshu Martino MD  [address] [place] [address], [address]  [facility] [address]  [phone]    Neurosurgery  [address] [facility], [address]  [place] [address] [phone]    Electrophysiology  Jason Ortega MD  [address] [place] [address] [phone]    Eliseo Braxton MD  [address] [facility] [address]  [phone]    Neurology  Dr Osvaldo Watkins  Appointment  8/16/23 at 3.40 pm at [address] [place] [address]

## 2023-08-02 NOTE — PROGRESS NOTE ADULT - SUBJECTIVE AND OBJECTIVE BOX
CHADD VALDIVIA  [id number]    Cardiology re-consulted due to orthostatic hypotension and tachycardia     Chief Complaint: Fall/Subdural hemorrhage/Atrial fibrillation    Interval History: The patient reports lightheadedness when standing, denies syncope, chest pain, shortness of breath or palpitations.     Tele: not on telemetry (in rehab)       Current meds:   acetaminophen     Tablet .. 650 milliGRAM(s) Oral every 6 hours PRN  aMIOdarone    Tablet 200 milliGRAM(s) Oral daily  ammonium lactate 12% Lotion 1 Application(s) Topical two times a day  apixaban 5 milliGRAM(s) Oral two times a day  cefuroxime   Tablet 500 milliGRAM(s) Oral every 12 hours  melatonin 9 milliGRAM(s) Oral at bedtime  midodrine. 5 milliGRAM(s) Oral <User Schedule>  mirtazapine 7.5 milliGRAM(s) Oral at bedtime  neomycin/bacitracin/polymyxin Topical Ointment 1 Application(s) Topical two times a day  polyethylene glycol 3350 17 Gram(s) Oral daily PRN  senna 2 Tablet(s) Oral at bedtime  sodium chloride 0.9%. 1000 milliLiter(s) IV Continuous <Continuous>  zinc oxide 40% Paste 1 Application(s) Topical two times a day      Objective:     Vital Signs:   T(C): 36.8 (08-02-23 @ 07:47), Max: 36.8 (08-02-23 @ 07:47)  HR: 79 (08-02-23 @ 07:47) (79 - 97)  BP: 114/78 (08-02-23 @ 07:47) (114/78 - 117/71)  RR: 16 (08-02-23 @ 07:47) (16 - 16)  SpO2: 96% (08-02-23 @ 07:47) (96% - 97%)  Wt(kg): --    Physical Exam:   General: no acute distress  Neck: supple   CVS:  JVP ~ 7 cm H20, RRR, s1, s2, no murmurs  Pulm: unlabored respirations, CTAB  Ext: no lower extremity edema b/l   Neuro: awake, alert and oriented   Psych: Normal affect    Labs:       TTE (7/24/23):   1. Technically difficult study with poor endocardial visualization.   2. Normal global left ventricular systolic function.   3. Left ventricular ejection fraction, by visual estimation, is 55 to 60%.   4. Calculated LVEF by Simpsons Biplane Method 59%.   5. Mildly increased LV wall thickness.   6. Normal left ventricular internal cavity size.   7. Abnormal septal motion consistent with post-operative status.   8. The mitral in-flow pattern reveals no discernable A-wave, therefore no comment on diastolic function can be made.   9. Mildly reduced RV systolic function.  10. Severely enlarged left atrium.  11. Right atrial enlargement.  12. Moderate mitral annular calcification.  13. Moderate thickening and calcification of the anterior and posterior mitral valve leaflets.  14. Moderate mitral valve regurgitation.  15. Borderline elevated mitral valve mean gradient 5 mmHg at HR 88 BPM.  16. Mild-moderate tricuspid regurgitation.  17. There is a bioprosthetic valve in the aortic position with peak   velocity within normal limits.  18. Moderate aortic regurgitation.  19. Mild pulmonic valve regurgitation.  20. There is no evidence of pericardial effusion.      ECG (8/1/23): atrial fibrillation with elevated HR, LAFB

## 2023-08-02 NOTE — PROGRESS NOTE ADULT - ASSESSMENT
82 yo with PMH of A fib, HFrEF, HTN, and hx of AVR presented to [facility] on 7/6 after fall, found to have SDH/SAH. Hospital Course complicated by A fib RVR and COVID (+). Patient now admitted to [facility] for a multidisciplinary rehab program.     #BPH  #Urinary hesitancy - symptom improved  #Pyuria  - urine showed probable contamination. However, urinary hesitancy improved with abx, will finish 5 days course.   - cont with ceftin BID day 4 today, total 5 days  - held Flomax 0.4 mg     #SAH/SDH  - Eliquis (for AF)   - Aspiration and fall precautions    #HFrEF  #Chronic A fib  #HTN  #orthostatic hypotension  - EF 30%  - Amiodarone 200mg daily  - Toprol 12.5mg and Farxiga on hold due to orthostatic hypotension  - pt reports worsened lightheadedness this morning  - check orthostatic VS   - give gentle IVF for 12 h today.  - Midodrine 5 mg BID   - not a candidate for ACE/ARBS/ARNI due to OH  - cardiology consult     #CKD3  - Unknown baseline  - Avoid nephrotoxins  - Monitor    #COVID-19 Infection  - PCR positive 7/7, repeat on 7/11 negative  - Monitor     #DVT prophylaxis: eliquis

## 2023-08-02 NOTE — PROGRESS NOTE ADULT - ASSESSMENT
Assessment:  Murali Rand is an 83 year old man with past medical history of Atrial fibrillation, Cardiomyopathy, Hypertension, Bio-AVR with recent hospitalization at [facility] after mechanical fall with resultant subdural/subarachnoid hemorrhage with hospital course consistent with atrial fibrillation with rapid ventricular rates, also with course of COVID, now admitted to [facility].    Cardiology re-consulted due to episodes of orthostatic hypotension and tachycardia. Patient reports dizziness with standing, no syncope. Denies chest pain, palpitations or shortness of breath. Most recent ECG consistent with atrial fibrillation and LAFB. Echo consistent with normal LVEF 55-60%, bio AVR with mild AR.    Recommendations:  [] Orthostatic hypotension, tachycardia: Patient may have some volume depletion, increase PO intake, consider gentle IV fluids. Increase Midodrine to TID dosing. Recommend compression stockings and abdominal binder if recurrent orthostatic hypotension. Hold beta blocker at this time. Check labs today. Treat UTI.   [] Atrial fibrillation: Eventual plan for low dose beta blocker pending BP with higher dose of Midodrine. Continue amiodarone (monitor LFTs, TSH, PFTs). Currently receiving Eliquis    The patient should follow up with his cardiologist, Dr. Braxton at [facility] when discharged. Discussed with patient, family and RN at bedside.    Bib Guadalupe MD  Cardiology

## 2023-08-03 LAB
ANION GAP SERPL CALC-SCNC: 4 MMOL/L — LOW (ref 5–17)
APPEARANCE UR: CLEAR — SIGNIFICANT CHANGE UP
BACTERIA # UR AUTO: ABNORMAL /HPF
BILIRUB UR-MCNC: NEGATIVE — SIGNIFICANT CHANGE UP
BUN SERPL-MCNC: 41 MG/DL — HIGH (ref 7–23)
CALCIUM SERPL-MCNC: 9 MG/DL — SIGNIFICANT CHANGE UP (ref 8.4–10.5)
CHLORIDE SERPL-SCNC: 103 MMOL/L — SIGNIFICANT CHANGE UP (ref 96–108)
CO2 SERPL-SCNC: 32 MMOL/L — HIGH (ref 22–31)
COLOR SPEC: YELLOW — SIGNIFICANT CHANGE UP
CREAT SERPL-MCNC: 1.55 MG/DL — HIGH (ref 0.5–1.3)
DIFF PNL FLD: NEGATIVE — SIGNIFICANT CHANGE UP
EGFR: 45 ML/MIN/1.73M2 — LOW
EPI CELLS # UR: 1 — SIGNIFICANT CHANGE UP
GLUCOSE SERPL-MCNC: 89 MG/DL — SIGNIFICANT CHANGE UP (ref 70–99)
GLUCOSE UR QL: NEGATIVE MG/DL — SIGNIFICANT CHANGE UP
HCT VFR BLD CALC: 37.2 % — LOW (ref 39–50)
HGB BLD-MCNC: 12 G/DL — LOW (ref 13–17)
KETONES UR-MCNC: NEGATIVE MG/DL — SIGNIFICANT CHANGE UP
LEUKOCYTE ESTERASE UR-ACNC: ABNORMAL
MAGNESIUM SERPL-MCNC: 2.1 MG/DL — SIGNIFICANT CHANGE UP (ref 1.6–2.6)
MCHC RBC-ENTMCNC: 32.3 GM/DL — SIGNIFICANT CHANGE UP (ref 32–36)
MCHC RBC-ENTMCNC: 33 PG — SIGNIFICANT CHANGE UP (ref 27–34)
MCV RBC AUTO: 102.2 FL — HIGH (ref 80–100)
NITRITE UR-MCNC: NEGATIVE — SIGNIFICANT CHANGE UP
NRBC # BLD: 0 /100 WBCS — SIGNIFICANT CHANGE UP (ref 0–0)
PH UR: 5.5 — SIGNIFICANT CHANGE UP (ref 5–8)
PLATELET # BLD AUTO: 183 K/UL — SIGNIFICANT CHANGE UP (ref 150–400)
POTASSIUM SERPL-MCNC: 4.6 MMOL/L — SIGNIFICANT CHANGE UP (ref 3.5–5.3)
POTASSIUM SERPL-SCNC: 4.6 MMOL/L — SIGNIFICANT CHANGE UP (ref 3.5–5.3)
PROT UR-MCNC: 30 MG/DL
RBC # BLD: 3.64 M/UL — LOW (ref 4.2–5.8)
RBC # FLD: 13.7 % — SIGNIFICANT CHANGE UP (ref 10.3–14.5)
RBC CASTS # UR COMP ASSIST: 1 /HPF — SIGNIFICANT CHANGE UP (ref 0–4)
SODIUM SERPL-SCNC: 139 MMOL/L — SIGNIFICANT CHANGE UP (ref 135–145)
SP GR SPEC: 1.01 — SIGNIFICANT CHANGE UP (ref 1–1.03)
UROBILINOGEN FLD QL: 0.2 MG/DL — SIGNIFICANT CHANGE UP (ref 0.2–1)
WBC # BLD: 8.2 K/UL — SIGNIFICANT CHANGE UP (ref 3.8–10.5)
WBC # FLD AUTO: 8.2 K/UL — SIGNIFICANT CHANGE UP (ref 3.8–10.5)
WBC UR QL: 9 /HPF — HIGH (ref 0–5)

## 2023-08-03 PROCEDURE — 93010 ELECTROCARDIOGRAM REPORT: CPT

## 2023-08-03 PROCEDURE — 99232 SBSQ HOSP IP/OBS MODERATE 35: CPT

## 2023-08-03 RX ORDER — POLYETHYLENE GLYCOL 3350 17 G/17G
17 POWDER, FOR SOLUTION ORAL
Qty: 0 | Refills: 0 | DISCHARGE
Start: 2023-08-03

## 2023-08-03 RX ORDER — AMIODARONE HYDROCHLORIDE 400 MG/1
1 TABLET ORAL
Qty: 30 | Refills: 0
Start: 2023-08-03 | End: 2023-09-01

## 2023-08-03 RX ORDER — MIDODRINE HYDROCHLORIDE 2.5 MG/1
7.5 TABLET ORAL
Refills: 0 | Status: DISCONTINUED | OUTPATIENT
Start: 2023-08-03 | End: 2023-08-04

## 2023-08-03 RX ORDER — LANOLIN ALCOHOL/MO/W.PET/CERES
3 CREAM (GRAM) TOPICAL
Qty: 0 | Refills: 0 | DISCHARGE
Start: 2023-08-03

## 2023-08-03 RX ORDER — METOPROLOL TARTRATE 50 MG
12.5 TABLET ORAL ONCE
Refills: 0 | Status: COMPLETED | OUTPATIENT
Start: 2023-08-03 | End: 2023-08-03

## 2023-08-03 RX ORDER — SOD,AMMONIUM,POTASSIUM LACTATE
1 CREAM (GRAM) TOPICAL
Qty: 0 | Refills: 0 | DISCHARGE
Start: 2023-08-03

## 2023-08-03 RX ORDER — MIDODRINE HYDROCHLORIDE 2.5 MG/1
3 TABLET ORAL
Qty: 270 | Refills: 0
Start: 2023-08-03 | End: 2023-09-01

## 2023-08-03 RX ORDER — ACETAMINOPHEN 500 MG
2 TABLET ORAL
Qty: 0 | Refills: 0 | DISCHARGE
Start: 2023-08-03

## 2023-08-03 RX ORDER — MIRTAZAPINE 45 MG/1
1 TABLET, ORALLY DISINTEGRATING ORAL
Qty: 30 | Refills: 0
Start: 2023-08-03 | End: 2023-09-01

## 2023-08-03 RX ORDER — LACTULOSE 10 G/15ML
20 SOLUTION ORAL ONCE
Refills: 0 | Status: COMPLETED | OUTPATIENT
Start: 2023-08-03 | End: 2023-08-03

## 2023-08-03 RX ORDER — CEFUROXIME AXETIL 250 MG
1 TABLET ORAL
Qty: 2 | Refills: 0
Start: 2023-08-03 | End: 2023-08-03

## 2023-08-03 RX ORDER — SENNA PLUS 8.6 MG/1
2 TABLET ORAL
Qty: 0 | Refills: 0 | DISCHARGE
Start: 2023-08-03

## 2023-08-03 RX ORDER — ZINC OXIDE 200 MG/G
1 OINTMENT TOPICAL
Qty: 0 | Refills: 0 | DISCHARGE
Start: 2023-08-03

## 2023-08-03 RX ORDER — APIXABAN 2.5 MG/1
1 TABLET, FILM COATED ORAL
Qty: 60 | Refills: 0
Start: 2023-08-03 | End: 2023-09-01

## 2023-08-03 RX ORDER — METOPROLOL TARTRATE 50 MG
12.5 TABLET ORAL DAILY
Refills: 0 | Status: DISCONTINUED | OUTPATIENT
Start: 2023-08-04 | End: 2023-08-05

## 2023-08-03 RX ADMIN — APIXABAN 5 MILLIGRAM(S): 2.5 TABLET, FILM COATED ORAL at 05:06

## 2023-08-03 RX ADMIN — Medication 500 MILLIGRAM(S): at 17:50

## 2023-08-03 RX ADMIN — SENNA PLUS 2 TABLET(S): 8.6 TABLET ORAL at 21:20

## 2023-08-03 RX ADMIN — AMIODARONE HYDROCHLORIDE 200 MILLIGRAM(S): 400 TABLET ORAL at 05:07

## 2023-08-03 RX ADMIN — Medication 500 MILLIGRAM(S): at 05:07

## 2023-08-03 RX ADMIN — MIDODRINE HYDROCHLORIDE 7.5 MILLIGRAM(S): 2.5 TABLET ORAL at 05:07

## 2023-08-03 RX ADMIN — Medication 12.5 MILLIGRAM(S): at 18:04

## 2023-08-03 RX ADMIN — Medication 9 MILLIGRAM(S): at 21:20

## 2023-08-03 RX ADMIN — APIXABAN 5 MILLIGRAM(S): 2.5 TABLET, FILM COATED ORAL at 17:50

## 2023-08-03 RX ADMIN — POLYETHYLENE GLYCOL 3350 17 GRAM(S): 17 POWDER, FOR SOLUTION ORAL at 08:27

## 2023-08-03 RX ADMIN — Medication 1 APPLICATION(S): at 05:08

## 2023-08-03 RX ADMIN — ZINC OXIDE 1 APPLICATION(S): 200 OINTMENT TOPICAL at 17:51

## 2023-08-03 RX ADMIN — MIDODRINE HYDROCHLORIDE 7.5 MILLIGRAM(S): 2.5 TABLET ORAL at 12:55

## 2023-08-03 RX ADMIN — MIDODRINE HYDROCHLORIDE 7.5 MILLIGRAM(S): 2.5 TABLET ORAL at 18:05

## 2023-08-03 RX ADMIN — MIRTAZAPINE 7.5 MILLIGRAM(S): 45 TABLET, ORALLY DISINTEGRATING ORAL at 21:20

## 2023-08-03 RX ADMIN — Medication 1 APPLICATION(S): at 17:51

## 2023-08-03 RX ADMIN — ZINC OXIDE 1 APPLICATION(S): 200 OINTMENT TOPICAL at 05:08

## 2023-08-03 RX ADMIN — BACITRACIN ZINC NEOMYCIN SULFATE POLYMYXIN B SULFATE 1 APPLICATION(S): 400; 3.5; 5 OINTMENT TOPICAL at 17:51

## 2023-08-03 RX ADMIN — LACTULOSE 20 GRAM(S): 10 SOLUTION ORAL at 11:55

## 2023-08-03 NOTE — PROGRESS NOTE ADULT - ASSESSMENT
Assessment/Plan:  CHADD VALDIVIA is a 83y with PMH of A fib, HFrEF, HTN, and hx of AVR who presented to the [facility] on 7/6 after fall, found to have SDH/SAH.  Hospital Course complicated by A fib RVR and COVID (+). Patient now admitted for a multidisciplinary rehab program. 07-12-23 @ 15:20    #SAH/SDH  (Left frontal SDH) --7/6  -CTH 7/13--No interval change --Trace subdural hemorrhage along the left side of the posterior falx measuring 2 to 3 mm.  - Keppra completed on 7/23 per [facility] chart review  - Neurologist-Dr Osvaldo Santacruz approved restarting apixiban 7/27  - Continue comprehensive rehab program of PT/OT/SLP - 3 hours a day, 5 days a week.   - Repeat CTH on 7/20 and 7/29 show stability     #A fib  - Course c/b A fib with RVR  - Echocardiogram EF 55-60% 7/25  - Will require outpatient cardio f/u, patient good candidate for Watchman implant in future  ( Dr Jonas, cardiologist at [facility])   - Continue Amiodarone  - Metoprolol ER 12.5 discontinued 8/1 due to OH - monitor for tachycardia and consider resuming dose if needed  -Continue standing midodrine 7.5mg TID (cardiology in agreement)  -Continue Eliquis 5mg BID 7/27 - head CT stable 7/29   -Stop digoxin as per outpatient cardiology due to starting ELIQUIS     #COVID (+) -resolved   - PCR positive 7/7, repeat on 7/11 negative    #Orthostatic hypotension  -midodrine increased to 7.5 TID  8/3  - Stop lopressor 12.5mg BID 8/1  -NS at 75 ml/HR x 12 hours started 8/2 due to symptoms -now off   -Monitor OH daily     #Pain  - continue Tylenol     #Insomnia/mood/depression  - Continue Melatonin  9 mg qhs  -Continue Mirtazapine 7.5mg at bedtime for mood/sleep and increase appetite  7/31    #GI / Bowel  - Senna qHS  - Miralax PRN Daily  - GI ppx: Protonix    #/Bladder/UTI  BPH/Urinary retention  - Flomax DCd 7/20 due to possible OH contribution  --F/u with his urologist  -UTI with +UA on 7/30, pending Ucx  -Continue Ceftin 500mg BID for 5 days   -Monitor bladder scans for retention     #Skin / Pressure injury  -Skin tear x 2 areas right forearm. continue  topical antibiotic cream (neomycin) and  protective dressing   - soft heel protectors    #Diet/Dysphagia:  - Diet Consistency: Regular  - Aspiration Precautions  - on SLP f/u  - Nutrition f/u ongoing    #DVT prophylaxis:   - SCD    # Health maintenance  - Vitamin B12 and Folate labs - normal   --------------------------------------------  IDT conference on 7/31  TDD: 8/4 to home (extended for monitoring of OH)  Barriers: Forgetful, poor balance.  Social Work: Lives alone in  with 5-6 ISABEL with 1 HR and 14 STI. Has chair lift. Gets HHA 2-3 hrs 2x per week. Daughter lives close by.  OT: SV for adls and transfers   PT: SV for transfers and ambulation, CG during turns at times   SLP: Regular with TLs. Mild cognitive deficits. Improvements in recall, safety, and fall prevention.   Family training completed.   --------------------------------------------  Outpatient Follow-up:  Specialty and Name of physician  Nephrology  Anshu Martino MD  [address], [address]  [facility] [address]  [phone]    Neurosurgery  [address], [address]  [place], [place] [id number] [phone]    Electrophysiology  Jason Ortega MD  [address] [place] [address] [phone]    Eliseo Braxton MD  [address] [facility] [id number] [phone]    Neurology  Dr Osvaldo Watkins  Appointment  8/16/23 at 3.40 pm at [address] [place] [address]

## 2023-08-03 NOTE — PROGRESS NOTE ADULT - SUBJECTIVE AND OBJECTIVE BOX
Patient is a 83y old  Male who presents with a chief complaint of s/p subdural/subarachnoid hemorrhage (02 Aug 2023 13:50)      Subjective and overnight events:  Patient seen and examined at bedside. pt reports lightheadedness resolved today. urinary hesitancy improving. no fever, chills, sob, cp, abd pain     ALLERGIES:  No Known Allergies    MEDICATIONS  (STANDING):  aMIOdarone    Tablet 200 milliGRAM(s) Oral daily  ammonium lactate 12% Lotion 1 Application(s) Topical two times a day  apixaban 5 milliGRAM(s) Oral two times a day  cefuroxime   Tablet 500 milliGRAM(s) Oral every 12 hours  melatonin 9 milliGRAM(s) Oral at bedtime  midodrine. 7.5 milliGRAM(s) Oral <User Schedule>  mirtazapine 7.5 milliGRAM(s) Oral at bedtime  neomycin/bacitracin/polymyxin Topical Ointment 1 Application(s) Topical two times a day  senna 2 Tablet(s) Oral at bedtime  sodium chloride 0.9%. 1000 milliLiter(s) (75 mL/Hr) IV Continuous <Continuous>  zinc oxide 40% Paste 1 Application(s) Topical two times a day    MEDICATIONS  (PRN):  acetaminophen     Tablet .. 650 milliGRAM(s) Oral every 6 hours PRN Mild Pain (1 - 3)  polyethylene glycol 3350 17 Gram(s) Oral daily PRN Constipation    Vital Signs Last 24 Hrs  T(F): 97.8 (03 Aug 2023 08:17), Max: 98 (02 Aug 2023 20:27)  HR: 104 (03 Aug 2023 08:17) (77 - 104)  BP: 113/69 (03 Aug 2023 08:17) (113/69 - 117/84)  RR: 16 (03 Aug 2023 08:17) (16 - 16)  SpO2: 97% (03 Aug 2023 08:17) (97% - 98%)  I&O's Summary    PHYSICAL EXAM:  General: NAD, A/O x 3  ENT: MMM  Neck: Supple, No JVD  Lungs: Clear to auscultation bilaterally  Cardio: RRR, S1/S2, No murmurs  Abdomen: Soft, Nontender, Nondistended; Bowel sounds present  Extremities: No calf tenderness, No pitting edema    LABS:                        12.0   8.20  )-----------( 183      ( 03 Aug 2023 05:55 )             37.2     08-03    139  |  103  |  41  ----------------------------<  89  4.6   |  32  |  1.55    Ca    9.0      03 Aug 2023 05:55  Mg     2.1     08-03          Urinalysis Basic - ( 03 Aug 2023 05:55 )    Color: x / Appearance: x / SG: x / pH: x  Gluc: 89 mg/dL / Ketone: x  / Bili: x / Urobili: x   Blood: x / Protein: x / Nitrite: x   Leuk Esterase: x / RBC: x / WBC x   Sq Epi: x / Non Sq Epi: x / Bacteria: x        Culture - Urine (collected 30 Jul 2023 18:30)  Source: Clean Catch Clean Catch (Midstream)  Final Report (02 Aug 2023 10:28):    >=3 organisms. Probable collection contamination.          RADIOLOGY & ADDITIONAL TESTS:    Care Discussed with Consultants/Other Providers:

## 2023-08-03 NOTE — PROGRESS NOTE ADULT - ASSESSMENT
82 yo with PMH of A fib, HFrEF, HTN, and hx of AVR presented to [facility] on 7/6 after fall, found to have SDH/SAH. Hospital Course complicated by A fib RVR and COVID (+). Patient now admitted to [facility] for a multidisciplinary rehab program.     #BPH  #Urinary hesitancy - symptom improved  #Pyuria  - urine showed probable contamination. However, urinary hesitancy improved with abx, will finish 5 days course.   - cont with ceftin BID day 4 today, total 5 days  - held Flomax 0.4 mg     #SAH/SDH  - Eliquis (for AF)   - Aspiration and fall precautions    #HFrEF  #Chronic A fib  #HTN  - HR controlled  - EF 30%  - Amiodarone 200mg daily  - Toprol 12.5mg and Farxiga on hold due to orthostatic hypotension  - not a candidate for ACE/ARBS/ARNI due to OH    #orthostatic hypotension  - symptoms improved  - cardiology consult appreciated  - s/p gentle IVF yesterday  - midodrine increased to 7.5mg TID    #CKD3  - Unknown baseline  - Cr stable at 1.5s  - Avoid nephrotoxins  - Monitor    #COVID-19 Infection  - PCR positive 7/7, repeat on 7/11 negative  - Monitor     #DVT prophylaxis: eliquis

## 2023-08-03 NOTE — PROGRESS NOTE ADULT - SUBJECTIVE AND OBJECTIVE BOX
SUBJECTIVE/ROS: Patient evaluated while sitting in the chair. He states he is feeling much improved and slept very well last night. His BP was stable today. Patient denies chest pain, fever, chills, nausea, vomiting, abdominal pain, headache, or BLE pain.     HPI:  This is an 83 year old male with PMH of A fib, HFrEF, HTN, and hx of AVR who presented to the [facility] on 7/6 after fall. He was found to have a subdural/subarachnoid hemorrhage. Hospital course complicated by A fib with RVR s/p amiodarone and metoprolol. Per cardio recs, recommended by EPS and pt a good candidate for Watchman implant in future and will require outpatient f/u. Course further complicated by COVID (+) on 7/6. Repeat PCR on 7/11 negative. Patient evaluated by PT/OT and recommended for acute inpatient rehab. Patient is medically stable for DC to [facility] on 7/12.       Vital Signs Last 24 Hrs  T(C): 36.6 (03 Aug 2023 08:17), Max: 36.7 (02 Aug 2023 20:27)  T(F): 97.8 (03 Aug 2023 08:17), Max: 98 (02 Aug 2023 20:27)  HR: 104 (03 Aug 2023 08:17) (77 - 104)  BP: 113/69 (03 Aug 2023 08:17) (113/69 - 117/84)  BP(mean): --  RR: 16 (03 Aug 2023 08:17) (16 - 16)  SpO2: 97% (03 Aug 2023 08:17) (97% - 98%)    Parameters below as of 03 Aug 2023 08:17  Patient On (Oxygen Delivery Method): room air        PHYSICAL EXAM  Constitutional - NAD, Comfortable  HEENT - NCAT, EOMI  Neck - Supple, No limited ROM  Chest - CTA bilaterally  Cardiovascular - RRR, S1S2  Abdomen -BS+, Soft, NTND  Extremities - No C/C/E, No calf tenderness   Neurologic Exam -aox3, mae4+/5        LABS:                          12.0   8.20  )-----------( 183      ( 03 Aug 2023 05:55 )             37.2     08-03    139  |  103  |  41<H>  ----------------------------<  89  4.6   |  32<H>  |  1.55<H>    Ca    9.0      03 Aug 2023 05:55  Mg     2.1     08-03          Urinalysis Basic - ( 03 Aug 2023 05:55 )    Color: x / Appearance: x / SG: x / pH: x  Gluc: 89 mg/dL / Ketone: x  / Bili: x / Urobili: x   Blood: x / Protein: x / Nitrite: x   Leuk Esterase: x / RBC: x / WBC x   Sq Epi: x / Non Sq Epi: x / Bacteria: x          MEDICATIONS  (STANDING):  aMIOdarone    Tablet 200 milliGRAM(s) Oral daily  ammonium lactate 12% Lotion 1 Application(s) Topical two times a day  apixaban 5 milliGRAM(s) Oral two times a day  cefuroxime   Tablet 500 milliGRAM(s) Oral every 12 hours  melatonin 9 milliGRAM(s) Oral at bedtime  midodrine. 7.5 milliGRAM(s) Oral <User Schedule>  mirtazapine 7.5 milliGRAM(s) Oral at bedtime  neomycin/bacitracin/polymyxin Topical Ointment 1 Application(s) Topical two times a day  senna 2 Tablet(s) Oral at bedtime  sodium chloride 0.9%. 1000 milliLiter(s) (75 mL/Hr) IV Continuous <Continuous>  zinc oxide 40% Paste 1 Application(s) Topical two times a day    MEDICATIONS  (PRN):  acetaminophen     Tablet .. 650 milliGRAM(s) Oral every 6 hours PRN Mild Pain (1 - 3)  polyethylene glycol 3350 17 Gram(s) Oral daily PRN Constipation

## 2023-08-04 LAB
ANION GAP SERPL CALC-SCNC: 7 MMOL/L — SIGNIFICANT CHANGE UP (ref 5–17)
APPEARANCE UR: CLEAR — SIGNIFICANT CHANGE UP
BACTERIA # UR AUTO: NEGATIVE /HPF — SIGNIFICANT CHANGE UP
BILIRUB UR-MCNC: NEGATIVE — SIGNIFICANT CHANGE UP
BUN SERPL-MCNC: 36 MG/DL — HIGH (ref 7–23)
CALCIUM SERPL-MCNC: 9.3 MG/DL — SIGNIFICANT CHANGE UP (ref 8.4–10.5)
CHLORIDE SERPL-SCNC: 103 MMOL/L — SIGNIFICANT CHANGE UP (ref 96–108)
CO2 SERPL-SCNC: 31 MMOL/L — SIGNIFICANT CHANGE UP (ref 22–31)
COLOR SPEC: YELLOW — SIGNIFICANT CHANGE UP
CREAT SERPL-MCNC: 1.58 MG/DL — HIGH (ref 0.5–1.3)
DIFF PNL FLD: NEGATIVE — SIGNIFICANT CHANGE UP
EGFR: 44 ML/MIN/1.73M2 — LOW
EPI CELLS # UR: 0 — SIGNIFICANT CHANGE UP
GLUCOSE SERPL-MCNC: 143 MG/DL — HIGH (ref 70–99)
GLUCOSE UR QL: NEGATIVE MG/DL — SIGNIFICANT CHANGE UP
HCT VFR BLD CALC: 41.8 % — SIGNIFICANT CHANGE UP (ref 39–50)
HGB BLD-MCNC: 13.6 G/DL — SIGNIFICANT CHANGE UP (ref 13–17)
KETONES UR-MCNC: NEGATIVE MG/DL — SIGNIFICANT CHANGE UP
LEUKOCYTE ESTERASE UR-ACNC: ABNORMAL
MAGNESIUM SERPL-MCNC: 2.2 MG/DL — SIGNIFICANT CHANGE UP (ref 1.6–2.6)
MCHC RBC-ENTMCNC: 32.5 GM/DL — SIGNIFICANT CHANGE UP (ref 32–36)
MCHC RBC-ENTMCNC: 33.7 PG — SIGNIFICANT CHANGE UP (ref 27–34)
MCV RBC AUTO: 103.5 FL — HIGH (ref 80–100)
NITRITE UR-MCNC: NEGATIVE — SIGNIFICANT CHANGE UP
NRBC # BLD: 0 /100 WBCS — SIGNIFICANT CHANGE UP (ref 0–0)
PH UR: 5.5 — SIGNIFICANT CHANGE UP (ref 5–8)
PLATELET # BLD AUTO: 206 K/UL — SIGNIFICANT CHANGE UP (ref 150–400)
POTASSIUM SERPL-MCNC: 4.3 MMOL/L — SIGNIFICANT CHANGE UP (ref 3.5–5.3)
POTASSIUM SERPL-SCNC: 4.3 MMOL/L — SIGNIFICANT CHANGE UP (ref 3.5–5.3)
PROT UR-MCNC: SIGNIFICANT CHANGE UP MG/DL
RBC # BLD: 4.04 M/UL — LOW (ref 4.2–5.8)
RBC # FLD: 13.9 % — SIGNIFICANT CHANGE UP (ref 10.3–14.5)
RBC CASTS # UR COMP ASSIST: 1 /HPF — SIGNIFICANT CHANGE UP (ref 0–4)
SODIUM SERPL-SCNC: 141 MMOL/L — SIGNIFICANT CHANGE UP (ref 135–145)
SP GR SPEC: 1.01 — SIGNIFICANT CHANGE UP (ref 1–1.03)
UROBILINOGEN FLD QL: 0.2 MG/DL — SIGNIFICANT CHANGE UP (ref 0.2–1)
WBC # BLD: 8.17 K/UL — SIGNIFICANT CHANGE UP (ref 3.8–10.5)
WBC # FLD AUTO: 8.17 K/UL — SIGNIFICANT CHANGE UP (ref 3.8–10.5)
WBC UR QL: 4 /HPF — SIGNIFICANT CHANGE UP (ref 0–5)

## 2023-08-04 PROCEDURE — 99233 SBSQ HOSP IP/OBS HIGH 50: CPT

## 2023-08-04 RX ORDER — TAMSULOSIN HYDROCHLORIDE 0.4 MG/1
0.4 CAPSULE ORAL AT BEDTIME
Refills: 0 | Status: DISCONTINUED | OUTPATIENT
Start: 2023-08-04 | End: 2023-08-05

## 2023-08-04 RX ORDER — MIDODRINE HYDROCHLORIDE 2.5 MG/1
10 TABLET ORAL
Refills: 0 | Status: DISCONTINUED | OUTPATIENT
Start: 2023-08-04 | End: 2023-08-05

## 2023-08-04 RX ADMIN — MIDODRINE HYDROCHLORIDE 10 MILLIGRAM(S): 2.5 TABLET ORAL at 18:03

## 2023-08-04 RX ADMIN — Medication 500 MILLIGRAM(S): at 06:10

## 2023-08-04 RX ADMIN — MIDODRINE HYDROCHLORIDE 7.5 MILLIGRAM(S): 2.5 TABLET ORAL at 07:26

## 2023-08-04 RX ADMIN — APIXABAN 5 MILLIGRAM(S): 2.5 TABLET, FILM COATED ORAL at 18:03

## 2023-08-04 RX ADMIN — ZINC OXIDE 1 APPLICATION(S): 200 OINTMENT TOPICAL at 18:04

## 2023-08-04 RX ADMIN — MIDODRINE HYDROCHLORIDE 7.5 MILLIGRAM(S): 2.5 TABLET ORAL at 11:28

## 2023-08-04 RX ADMIN — SENNA PLUS 2 TABLET(S): 8.6 TABLET ORAL at 21:09

## 2023-08-04 RX ADMIN — Medication 12.5 MILLIGRAM(S): at 06:10

## 2023-08-04 RX ADMIN — MIRTAZAPINE 7.5 MILLIGRAM(S): 45 TABLET, ORALLY DISINTEGRATING ORAL at 21:09

## 2023-08-04 RX ADMIN — APIXABAN 5 MILLIGRAM(S): 2.5 TABLET, FILM COATED ORAL at 06:11

## 2023-08-04 RX ADMIN — Medication 9 MILLIGRAM(S): at 21:09

## 2023-08-04 RX ADMIN — AMIODARONE HYDROCHLORIDE 200 MILLIGRAM(S): 400 TABLET ORAL at 06:11

## 2023-08-04 RX ADMIN — TAMSULOSIN HYDROCHLORIDE 0.4 MILLIGRAM(S): 0.4 CAPSULE ORAL at 21:12

## 2023-08-04 NOTE — DIETITIAN NUTRITION RISK NOTIFICATION - TREATMENT: THE FOLLOWING DIET HAS BEEN RECOMMENDED
Diet, Regular:   DASH/TLC {Sodium & Cholesterol Restricted}  Supplement Feeding Modality:  Oral  Ensure Plus High Protein Cans or Servings Per Day:  1       Frequency:  Three Times a day (07-13-23 @ 14:30) [Active]      
Diet, Regular:   DASH/TLC {Sodium & Cholesterol Restricted} (07-12-23 @ 19:06) [Active]      Ensure Plus High Protein Daily 8 oz (Provides 350 kcal, 20 grams of protein) TID 
20

## 2023-08-04 NOTE — CONSULT NOTE ADULT - SUBJECTIVE AND OBJECTIVE BOX
This is an 83 year old male with PMH of A fib, HFrEF, HTN, and hx of AVR who presented to the [facility] on 7/6 after fall. He was found to have a subdural/subarachnoid hemorrhage. Hospital course complicated by A fib with RVR s/p amiodarone and metoprolol. Per cardio recs, recommended by EPS and pt a good candidate for Watchman implant in future and will require outpatient f/u. Course further complicated by COVID (+) on 7/6. Repeat PCR on 7/11 negative. Patient evaluated by PT/OT and recommended for acute inpatient rehab. Patient is medically stable for DC to [facility] on 7/12.     consulted for urinary retention, pt currently on Flomax    Pt states that he is able to void, but does not feel he empties his bladder fully    PAST MEDICAL & SURGICAL HISTORY:  Aortic valve replaced      Hypertension      Atrial fibrillation      Congestive heart failure (CHF)      Aortic valve replaced      S/P total knee arthroplasty      S/P hernia repair      MEDICATIONS  (STANDING):  aMIOdarone    Tablet 200 milliGRAM(s) Oral daily  ammonium lactate 12% Lotion 1 Application(s) Topical two times a day  apixaban 5 milliGRAM(s) Oral two times a day  melatonin 9 milliGRAM(s) Oral at bedtime  metoprolol succinate ER 12.5 milliGRAM(s) Oral daily  midodrine. 10 milliGRAM(s) Oral <User Schedule>  mirtazapine 7.5 milliGRAM(s) Oral at bedtime  neomycin/bacitracin/polymyxin Topical Ointment 1 Application(s) Topical two times a day  senna 2 Tablet(s) Oral at bedtime  tamsulosin 0.4 milliGRAM(s) Oral at bedtime  zinc oxide 40% Paste 1 Application(s) Topical two times a day    Allergies    No Known Allergies    Intolerances    SHx - NC    FHx - NC    Vital Signs Last 24 Hrs  T(C): 36.6 (04 Aug 2023 08:48), Max: 36.7 (03 Aug 2023 19:40)  T(F): 97.9 (04 Aug 2023 08:48), Max: 98 (03 Aug 2023 19:40)  HR: 76 (04 Aug 2023 08:48) (69 - 130)  BP: 105/68 (04 Aug 2023 08:48) (98/67 - 123/74)  RR: 16 (04 Aug 2023 08:48) (16 - 16)  SpO2: 96% (04 Aug 2023 08:48) (96% - 98%)    Parameters below as of 04 Aug 2023 08:48  Patient On (Oxygen Delivery Method): room air    PHYSICAL EXAM:  General: NAD, A/O x 3  ENT: MMM  Neck: Supple, No JVD  Abdomen: soft, nd, nttp, no spt or palpable bladder  : pt voiding on his own, no Munguia in place   Extremities: No calf tenderness, No pitting edema                          13.6   8.17  )-----------( 206      ( 04 Aug 2023 08:33 )             41.8     08-04    141  |  103  |  36<H>  ----------------------------<  143<H>  4.3   |  31  |  1.58<H>    Ca    9.3      04 Aug 2023 08:33  Mg     2.2     08-04           This is an 83 year old male with PMH of A fib, HFrEF, HTN, and hx of AVR who presented to the [facility] on 7/6 after fall. He was found to have a subdural/subarachnoid hemorrhage. Hospital course complicated by A fib with RVR s/p amiodarone and metoprolol. Per cardio recs, recommended by EPS and pt a good candidate for Watchman implant in future and will require outpatient f/u. Course further complicated by COVID (+) on 7/6. Repeat PCR on 7/11 negative. Patient evaluated by PT/OT and recommended for acute inpatient rehab. Patient is medically stable for DC to [facility] on 7/12.     consulted for urinary retention, pt currently on Flomax    Pt states that he is able to void, but does not feel he empties his bladder fully    PAST MEDICAL & SURGICAL HISTORY:  Aortic valve replaced      Hypertension      Atrial fibrillation      Congestive heart failure (CHF)      Aortic valve replaced      S/P total knee arthroplasty      S/P hernia repair      MEDICATIONS  (STANDING):  aMIOdarone    Tablet 200 milliGRAM(s) Oral daily  ammonium lactate 12% Lotion 1 Application(s) Topical two times a day  apixaban 5 milliGRAM(s) Oral two times a day  melatonin 9 milliGRAM(s) Oral at bedtime  metoprolol succinate ER 12.5 milliGRAM(s) Oral daily  midodrine. 10 milliGRAM(s) Oral <User Schedule>  mirtazapine 7.5 milliGRAM(s) Oral at bedtime  neomycin/bacitracin/polymyxin Topical Ointment 1 Application(s) Topical two times a day  senna 2 Tablet(s) Oral at bedtime  tamsulosin 0.4 milliGRAM(s) Oral at bedtime  zinc oxide 40% Paste 1 Application(s) Topical two times a day    Allergies    No Known Allergies    Intolerances    SHx - NC    FHx - NC    Vital Signs Last 24 Hrs  T(C): 36.6 (04 Aug 2023 08:48), Max: 36.7 (03 Aug 2023 19:40)  T(F): 97.9 (04 Aug 2023 08:48), Max: 98 (03 Aug 2023 19:40)  HR: 76 (04 Aug 2023 08:48) (69 - 130)  BP: 105/68 (04 Aug 2023 08:48) (98/67 - 123/74)  RR: 16 (04 Aug 2023 08:48) (16 - 16)  SpO2: 96% (04 Aug 2023 08:48) (96% - 98%)    Parameters below as of 04 Aug 2023 08:48  Patient On (Oxygen Delivery Method): room air    PHYSICAL EXAM:  General: NAD, A/O x 3  ENT: MMM  Neck: Supple, No JVD  Abdomen: soft, nd, nttp, no spt or palpable bladder  : pt voiding on his own, no Munguia in place   Extremities: No calf tenderness, No pitting edema                          13.6   8.17  )-----------( 206      ( 04 Aug 2023 08:33 )             41.8     08-04    141  |  103  |  36<H>  ----------------------------<  143<H>  4.3   |  31  |  1.58<H>    Ca    9.3      04 Aug 2023 08:33  Mg     2.2     08-04      Specimen Source: Clean Catch Clean Catch (Midstream) (07.30.23 @ 18:30)         This is an 83 year old male with PMH of A fib, HFrEF, HTN, and hx of AVR who presented to the [facility] on 7/6 after fall. He was found to have a subdural/subarachnoid hemorrhage. Hospital course complicated by A fib with RVR s/p amiodarone and metoprolol. Per cardio recs, recommended by EPS and pt a good candidate for Watchman implant in future and will require outpatient f/u. Course further complicated by COVID (+) on 7/6. Repeat PCR on 7/11 negative. Patient evaluated by PT/OT and recommended for acute inpatient rehab. Patient is medically stable for DC to [facility] on 7/12.     consulted for urinary retention, pt currently on Flomax. Concerns for orthostatic hypotension.     Pt states that he is able to void, but does not feel he empties his bladder fully. High PVR on bladder scans ~800cc.     PAST MEDICAL & SURGICAL HISTORY:  Aortic valve replaced      Hypertension      Atrial fibrillation      Congestive heart failure (CHF)      Aortic valve replaced      S/P total knee arthroplasty      S/P hernia repair      MEDICATIONS  (STANDING):  aMIOdarone    Tablet 200 milliGRAM(s) Oral daily  ammonium lactate 12% Lotion 1 Application(s) Topical two times a day  apixaban 5 milliGRAM(s) Oral two times a day  melatonin 9 milliGRAM(s) Oral at bedtime  metoprolol succinate ER 12.5 milliGRAM(s) Oral daily  midodrine. 10 milliGRAM(s) Oral <User Schedule>  mirtazapine 7.5 milliGRAM(s) Oral at bedtime  neomycin/bacitracin/polymyxin Topical Ointment 1 Application(s) Topical two times a day  senna 2 Tablet(s) Oral at bedtime  tamsulosin 0.4 milliGRAM(s) Oral at bedtime  zinc oxide 40% Paste 1 Application(s) Topical two times a day    Allergies    No Known Allergies    Intolerances    SHx - NC    FHx - NC    Vital Signs Last 24 Hrs  T(C): 36.6 (04 Aug 2023 08:48), Max: 36.7 (03 Aug 2023 19:40)  T(F): 97.9 (04 Aug 2023 08:48), Max: 98 (03 Aug 2023 19:40)  HR: 76 (04 Aug 2023 08:48) (69 - 130)  BP: 105/68 (04 Aug 2023 08:48) (98/67 - 123/74)  RR: 16 (04 Aug 2023 08:48) (16 - 16)  SpO2: 96% (04 Aug 2023 08:48) (96% - 98%)    Parameters below as of 04 Aug 2023 08:48  Patient On (Oxygen Delivery Method): room air    PHYSICAL EXAM:  General: NAD, A/O x 3  ENT: MMM  Neck: Supple, No JVD  Abdomen: soft, nd, nttp, no spt or palpable bladder  : pt voiding on his own, no Munguia in place   Extremities: No calf tenderness, No pitting edema                          13.6   8.17  )-----------( 206      ( 04 Aug 2023 08:33 )             41.8     08-04    141  |  103  |  36<H>  ----------------------------<  143<H>  4.3   |  31  |  1.58<H>    Ca    9.3      04 Aug 2023 08:33  Mg     2.2     08-04      Specimen Source: Clean Catch Clean Catch (Midstream) (07.30.23 @ 18:30)

## 2023-08-04 NOTE — PROGRESS NOTE ADULT - ASSESSMENT
82 yo with PMH of A fib, HFrEF, HTN, and hx of AVR presented to [facility] on 7/6 after fall, found to have SDH/SAH. Hospital Course complicated by A fib RVR and COVID (+). Patient now admitted to [facility] for a multidisciplinary rehab program.     #BPH  #Urinary hesitancy - symptom improved  - urine cx showed probable contamination. However, urinary hesitancy improved with abx, will finish Ceftin 5 days course. last day today  - Flomax on hold due to orthostatic hypotension    #New urinary rentention 8/4  - pt in urinary retention today, straight cath drained 800cc of urine  - spoke to Urology Dr. Saldivar, cont to hold flomax due to OH, pt may need mcclain placement   - follow up Urology    #SAH/SDH  - Eliquis (for AF)   - Aspiration and fall precautions    #HFrEF (EF 30%)  #Chronic A fib  #Orthostatic hypotension  - PT wtih tachycardia yesterday and today, EKG reviewed, A fib with RVR  - resume Toprol 12.5mg. Increase midodrine if necessary  - cont with midodrine 7.5mg TID  - Amiodarone 200mg daily  - not a candidate for ACE/ARBS/ARNI due to OH    #CKD3  - Unknown baseline  - Cr stable at 1.5s  - Avoid nephrotoxins  - Monitor    #COVID-19 Infection  - PCR positive 7/7, repeat on 7/11 negative  - Monitor     #DVT prophylaxis: eliquis

## 2023-08-04 NOTE — DIETITIAN NUTRITION RISK NOTIFICATION - MALNUTRITION EVALUATION AS DEMONSTRATED BY (ADULTS > 20 YEARS OF AGE)
Weight loss.../Loss of subcutaneous fat.../Loss of muscle...
Weight loss.../Inadequate energy intake.../Loss of subcutaneous fat.../Loss of muscle...

## 2023-08-04 NOTE — CONSULT NOTE ADULT - ASSESSMENT
This is an 83 year old male with PMH of A fib, HFrEF, HTN, and hx of AVR who presented to the [facility] on 7/6 after fall. He was found to have a subdural/subarachnoid hemorrhage. Hospital course complicated by A fib with RVR s/p amiodarone and metoprolol. Per cardio recs, recommended by EPS and pt a good candidate for Watchman implant in future and will require outpatient f/u. Course further complicated by COVID (+) on 7/6. Repeat PCR on 7/11 negative. Patient evaluated by PT/OT and recommended for acute inpatient rehab. Patient is medically stable for DC to [facility] on 7/12.     consulted for urinary retention, pt currently on Flomax    Pt states that he is able to void, but does not feel he empties his bladder fully    pt had 150-400cc PVR on bladder scans    plan  - continue Flomax  - bladder scan q 6 hours, if PVR > 350 cc, IC  - ambulate  - avoid narcotics and constipation   This is an 83 year old male with PMH of A fib, HFrEF, HTN, and hx of AVR who presented to the [facility] on 7/6 after fall. He was found to have a subdural/subarachnoid hemorrhage. Hospital course complicated by A fib with RVR s/p amiodarone and metoprolol. Per cardio recs, recommended by EPS and pt a good candidate for Watchman implant in future and will require outpatient f/u. Course further complicated by COVID (+) on 7/6. Repeat PCR on 7/11 negative. Patient evaluated by PT/OT and recommended for acute inpatient rehab. Patient is medically stable for DC to [facility] on 7/12.     consulted for urinary retention, pt currently on Flomax. Concerns for orthostatic hypotension.     Pt states that he is able to void, but does not feel he empties his bladder fully. High PVR on bladder scans.        Plan  - Discontinue Flomax given concern for orthostatic hypotension / unsteady gait  - Place mcclain catheter if Post-void residual on bladder scan >375cc  - ambulate  - avoid narcotics and constipation

## 2023-08-04 NOTE — PROGRESS NOTE ADULT - SUBJECTIVE AND OBJECTIVE BOX
CHIEF COMPLAINT: tachycardic today , found to be in urinary retention ( large PVR) requiring ISC       HISTORY OF PRESENT ILLNESS    This is an 83 year old male with PMH of A fib, HFrEF, HTN, and hx of AVR who presented to the [facility] on  after fall. He was found to have a subdural/subarachnoid hemorrhage. Hospital course complicated by A fib with RVR s/p amiodarone and metoprolol. Per cardio recs, recommended by EPS and pt a good candidate for Watchman implant in future and will require outpatient f/u. Course further complicated by COVID (+) on . Repeat PCR on  negative. Patient evaluated by PT/OT and recommended for acute inpatient rehab. Patient is medically stable for DC to [facility] on . (2023 15:07)        PAST MEDICAL & SURGICAL HISTORY:  Aortic valve replaced      Hypertension      Atrial fibrillation      Congestive heart failure (CHF)      Aortic valve replaced      S/P total knee arthroplasty      S/P hernia repair        VITALS  Vital Signs Last 24 Hrs  T(C): 36.6 (04 Aug 2023 08:48), Max: 36.7 (03 Aug 2023 19:40)  T(F): 97.9 (04 Aug 2023 08:48), Max: 98 (03 Aug 2023 19:40)  HR: 76 (04 Aug 2023 08:48) (69 - 130)  BP: 105/68 (04 Aug 2023 08:48) (98/67 - 123/74)  BP(mean): --  RR: 16 (04 Aug 2023 08:48) (16 - 16)  SpO2: 96% (04 Aug 2023 08:48) (96% - 98%)    Parameters below as of 04 Aug 2023 08:48  Patient On (Oxygen Delivery Method): room air          PHYSICAL EXAM  Constitutional - NAD, Comfortable  HEENT - NCAT, EOMI  Neck - Supple, No limited ROM  Chest - CTA bilaterally  Cardiovascular - RRR, S1S2  Abdomen - Soft, NTND  Extremities -  No calf tenderness   Neurologic Exam - no new focal deficits                       RECENT LABS                        13.6   8.17  )-----------( 206      ( 04 Aug 2023 08:33 )             41.8     08-04    141  |  103  |  36<H>  ----------------------------<  143<H>  4.3   |  31  |  1.58<H>    Ca    9.3      04 Aug 2023 08:33  Mg     2.2     08-04          Urinalysis Basic - ( 04 Aug 2023 12:30 )    Color: Yellow / Appearance: Clear / S.012 / pH: x  Gluc: x / Ketone: Negative mg/dL  / Bili: Negative / Urobili: 0.2 mg/dL   Blood: x / Protein: Trace mg/dL / Nitrite: Negative   Leuk Esterase: Trace / RBC: 1 /HPF / WBC 4 /HPF   Sq Epi: x / Non Sq Epi: x / Bacteria: Negative /HPF                  Urinalysis Basic - ( 04 Aug 2023 12:30 )    Color: Yellow / Appearance: Clear / S.012 / pH: x  Gluc: x / Ketone: Negative mg/dL  / Bili: Negative / Urobili: 0.2 mg/dL   Blood: x / Protein: Trace mg/dL / Nitrite: Negative   Leuk Esterase: Trace / RBC: 1 /HPF / WBC 4 /HPF   Sq Epi: x / Non Sq Epi: x / Bacteria: Negative /HPF                CURRENT MEDICATIONS  MEDICATIONS  (STANDING):  aMIOdarone    Tablet 200 milliGRAM(s) Oral daily  ammonium lactate 12% Lotion 1 Application(s) Topical two times a day  apixaban 5 milliGRAM(s) Oral two times a day  melatonin 9 milliGRAM(s) Oral at bedtime  metoprolol succinate ER 12.5 milliGRAM(s) Oral daily  midodrine. 10 milliGRAM(s) Oral <User Schedule>  mirtazapine 7.5 milliGRAM(s) Oral at bedtime  neomycin/bacitracin/polymyxin Topical Ointment 1 Application(s) Topical two times a day  senna 2 Tablet(s) Oral at bedtime  tamsulosin 0.4 milliGRAM(s) Oral at bedtime  zinc oxide 40% Paste 1 Application(s) Topical two times a day    MEDICATIONS  (PRN):  acetaminophen     Tablet .. 650 milliGRAM(s) Oral every 6 hours PRN Mild Pain (1 - 3)  polyethylene glycol 3350 17 Gram(s) Oral daily PRN Constipation    .

## 2023-08-04 NOTE — PROGRESS NOTE ADULT - ASSESSMENT
Assessment/Plan:  CHADD VALDIVIA is a 83y with PMH of A fib, HFrEF, HTN, and hx of AVR who presented to the [facility] on 7/6 after fall, found to have SDH/SAH.  Hospital Course complicated by A fib RVR and COVID (+). Patient now admitted for a multidisciplinary rehab program. 07-12-23 @ 15:20    #SAH/SDH  (Left frontal SDH) --7/6  -CTH 7/13--No interval change --Trace subdural hemorrhage along the left side of the posterior falx measuring 2 to 3 mm.  - Keppra completed on 7/23 per [facility] chart review  - Neurologist-Dr Osvaldo Santacruz approved restarting apixiban 7/27  - Continue comprehensive rehab program of PT/OT/SLP - 3 hours a day, 5 days a week.   - Repeat CTH on 7/20 and 7/29 show stability     #A fib  - Course c/b A fib with RVR  - Echocardiogram EF 55-60% 7/25  - Will require outpatient cardio f/u, patient good candidate for Watchman implant in future  ( Dr Jonas, cardiologist at [facility])   - Continue Amiodarone  -  tachycardia - Metoprolol resumed dose , discussed with Medicine   -  increase midodrine to 10  TID (cardiology in agreement)  -  Continue Eliquis 5mg BID 7/27 - head CT stable 7/29   -Stop digoxin as per outpatient cardiology due to starting ELIQUIS     #COVID (+) -resolved   - PCR positive 7/7, repeat on 7/11 negative    #Orthostatic hypotension  -midodrine increased to 10 TID  8/4  -NS at 75 ml/HR x 12 hours started 8/2 due to symptoms -now off   -Monitor OH daily     #Pain  - continue Tylenol     #Insomnia/mood/depression  - Continue Melatonin  9 mg qhs  -Continue Mirtazapine 7.5mg at bedtime for mood/sleep and increase appetite  7/31    #GI / Bowel  - Senna qHS  - Miralax PRN Daily  - GI ppx: Protonix    #/Bladder/UTI  BPH/Urinary retention - ISC for now   - Flomax DCd 7/20 due to possible OH contribution - will ask  to evaluate for ? resumption of Flomax   -UTI with +UA on 7/30,  - completed ABx- repeat UA/CS today   - Ceftin 500mg BID for 5 days completed   -Monitor bladder scans for retention     #Skin / Pressure injury  -Skin tear x 2 areas right forearm. continue  topical antibiotic cream (neomycin) and  protective dressing   - soft heel protectors    #Diet/Dysphagia:  - Diet Consistency: Regular  - Aspiration Precautions  - on SLP f/u  - Nutrition f/u ongoing    #DVT prophylaxis:   - SCD    # Health maintenance  - Vitamin B12 and Folate labs - normal   --------------------------------------------  IDT conference on 7/31  TDD:  anticipated 8/4 to home (extended for monitoring of OH) ---Hold discharge for today   Barriers: Forgetful, poor balance.  Social Work: Lives alone in  with 5-6 ISABEL with 1 HR and 14 STI. Has chair lift. Gets HHA 2-3 hrs 2x per week. Daughter lives close by.  OT: SV for adls and transfers   PT: SV for transfers and ambulation, CG during turns at times   SLP: Regular with TLs. Mild cognitive deficits. Improvements in recall, safety, and fall prevention.   Family training completed.   --------------------------------------------  Outpatient Follow-up:  Specialty and Name of physician  Nephrology  Anshu Martino MD  [address] [place] [address], [address]  [facility] [address]  [phone]    Neurosurgery  [address] [facility], [address]  [place] [address] [phone]    Electrophysiology  Jason Ortega MD  [address] [place] [address] [phone]    Eliseo Braxton MD  [address] [facility] [id number] [phone]    Neurology  Dr Osvaldo Watkins  Appointment  8/16/23 at 3.40 pm at [address] [place] [address]

## 2023-08-04 NOTE — CONSULT NOTE ADULT - REASON FOR ADMISSION
s/p subdural/subarachnoid hemorrhage

## 2023-08-04 NOTE — PROGRESS NOTE ADULT - SUBJECTIVE AND OBJECTIVE BOX
Patient is a 83y old  Male who presents with a chief complaint of s/p subdural/subarachnoid hemorrhage (03 Aug 2023 12:10)      Subjective and overnight events:  Patient seen and examined at bedside. + urinary retention. required straight cath this morning and 800cc of urine drained. + tachycardic yesterday and today. pt denies sob, cp, palpitations. no fever, chills, abd pain, dysuria.      ALLERGIES:  No Known Allergies    MEDICATIONS  (STANDING):  aMIOdarone    Tablet 200 milliGRAM(s) Oral daily  ammonium lactate 12% Lotion 1 Application(s) Topical two times a day  apixaban 5 milliGRAM(s) Oral two times a day  melatonin 9 milliGRAM(s) Oral at bedtime  metoprolol succinate ER 12.5 milliGRAM(s) Oral daily  midodrine. 7.5 milliGRAM(s) Oral <User Schedule>  mirtazapine 7.5 milliGRAM(s) Oral at bedtime  neomycin/bacitracin/polymyxin Topical Ointment 1 Application(s) Topical two times a day  senna 2 Tablet(s) Oral at bedtime  tamsulosin 0.4 milliGRAM(s) Oral at bedtime  zinc oxide 40% Paste 1 Application(s) Topical two times a day    MEDICATIONS  (PRN):  acetaminophen     Tablet .. 650 milliGRAM(s) Oral every 6 hours PRN Mild Pain (1 - 3)  polyethylene glycol 3350 17 Gram(s) Oral daily PRN Constipation    Vital Signs Last 24 Hrs  T(F): 97.9 (04 Aug 2023 08:48), Max: 98 (03 Aug 2023 19:40)  HR: 76 (04 Aug 2023 08:48) (69 - 130)  BP: 105/68 (04 Aug 2023 08:48) (98/67 - 123/74)  RR: 16 (04 Aug 2023 08:48) (16 - 16)  SpO2: 96% (04 Aug 2023 08:48) (96% - 98%)  I&O's Summary    03 Aug 2023 07:01  -  04 Aug 2023 07:00  --------------------------------------------------------  IN: 0 mL / OUT: 800 mL / NET: -800 mL    04 Aug 2023 07:01  -  04 Aug 2023 11:37  --------------------------------------------------------  IN: 0 mL / OUT: 400 mL / NET: -400 mL      PHYSICAL EXAM:  General: NAD, A/O x 3  ENT: MMM  Neck: Supple, No JVD  Lungs: Clear to auscultation bilaterally  Cardio: RRR, S1/S2, No murmurs  Abdomen: Soft, Nontender, Nondistended; Bowel sounds present  Extremities: No calf tenderness, No pitting edema    LABS:                        13.6   8.17  )-----------( 206      ( 04 Aug 2023 08:33 )             41.8     08-04    141  |  103  |  36  ----------------------------<  143  4.3   |  31  |  1.58    Ca    9.3      04 Aug 2023 08:33  Mg     2.2     08-04            Urinalysis Basic - ( 04 Aug 2023 08:33 )    Color: x / Appearance: x / SG: x / pH: x  Gluc: 143 mg/dL / Ketone: x  / Bili: x / Urobili: x   Blood: x / Protein: x / Nitrite: x   Leuk Esterase: x / RBC: x / WBC x   Sq Epi: x / Non Sq Epi: x / Bacteria: x        Culture - Urine (collected 30 Jul 2023 18:30)  Source: Clean Catch Clean Catch (Midstream)  Final Report (02 Aug 2023 10:28):    >=3 organisms. Probable collection contamination.          RADIOLOGY & ADDITIONAL TESTS:    Care Discussed with Consultants/Other Providers:

## 2023-08-05 LAB
CULTURE RESULTS: NO GROWTH — SIGNIFICANT CHANGE UP
SPECIMEN SOURCE: SIGNIFICANT CHANGE UP

## 2023-08-05 PROCEDURE — 99232 SBSQ HOSP IP/OBS MODERATE 35: CPT

## 2023-08-05 RX ORDER — METOPROLOL TARTRATE 50 MG
12.5 TABLET ORAL
Refills: 0 | Status: DISCONTINUED | OUTPATIENT
Start: 2023-08-05 | End: 2023-08-07

## 2023-08-05 RX ORDER — MIDODRINE HYDROCHLORIDE 2.5 MG/1
15 TABLET ORAL
Refills: 0 | Status: DISCONTINUED | OUTPATIENT
Start: 2023-08-05 | End: 2023-08-09

## 2023-08-05 RX ORDER — MIDODRINE HYDROCHLORIDE 2.5 MG/1
5 TABLET ORAL ONCE
Refills: 0 | Status: COMPLETED | OUTPATIENT
Start: 2023-08-05 | End: 2023-08-05

## 2023-08-05 RX ORDER — METOPROLOL TARTRATE 50 MG
12.5 TABLET ORAL
Refills: 0 | Status: DISCONTINUED | OUTPATIENT
Start: 2023-08-05 | End: 2023-08-05

## 2023-08-05 RX ORDER — METOPROLOL TARTRATE 50 MG
12.5 TABLET ORAL ONCE
Refills: 0 | Status: COMPLETED | OUTPATIENT
Start: 2023-08-05 | End: 2023-08-05

## 2023-08-05 RX ADMIN — Medication 9 MILLIGRAM(S): at 21:44

## 2023-08-05 RX ADMIN — SENNA PLUS 2 TABLET(S): 8.6 TABLET ORAL at 21:42

## 2023-08-05 RX ADMIN — MIDODRINE HYDROCHLORIDE 15 MILLIGRAM(S): 2.5 TABLET ORAL at 17:44

## 2023-08-05 RX ADMIN — MIDODRINE HYDROCHLORIDE 5 MILLIGRAM(S): 2.5 TABLET ORAL at 16:12

## 2023-08-05 RX ADMIN — AMIODARONE HYDROCHLORIDE 200 MILLIGRAM(S): 400 TABLET ORAL at 05:21

## 2023-08-05 RX ADMIN — MIDODRINE HYDROCHLORIDE 10 MILLIGRAM(S): 2.5 TABLET ORAL at 12:58

## 2023-08-05 RX ADMIN — APIXABAN 5 MILLIGRAM(S): 2.5 TABLET, FILM COATED ORAL at 17:44

## 2023-08-05 RX ADMIN — APIXABAN 5 MILLIGRAM(S): 2.5 TABLET, FILM COATED ORAL at 05:20

## 2023-08-05 RX ADMIN — MIRTAZAPINE 7.5 MILLIGRAM(S): 45 TABLET, ORALLY DISINTEGRATING ORAL at 21:42

## 2023-08-05 RX ADMIN — Medication 12.5 MILLIGRAM(S): at 21:43

## 2023-08-05 RX ADMIN — MIDODRINE HYDROCHLORIDE 10 MILLIGRAM(S): 2.5 TABLET ORAL at 05:20

## 2023-08-05 RX ADMIN — ZINC OXIDE 1 APPLICATION(S): 200 OINTMENT TOPICAL at 17:39

## 2023-08-05 NOTE — PROGRESS NOTE ADULT - ASSESSMENT
84 yo with PMH of A fib, HFrEF, HTN, and hx of AVR presented to [facility] on 7/6 after fall, found to have SDH/SAH. Hospital Course complicated by A fib RVR and COVID (+). Patient now admitted to [facility] for a multidisciplinary rehab program.     #BPH  #New urinary retention on 8/4  - per urology, HOLD flomax due to orthostatic hypotension   - continue to monitor bladder scan and straight cath prn  - may require mcclain placement if recurrent retention and follow up as outpt  - urine cx showed probable contamination. pt completed 5 days of ceftin.       #SAH/SDH  - Eliquis (for AF)   - Aspiration and fall precautions    #HFrEF (EF 30%)  #Chronic A fib  #Orthostatic hypotension  - PT wtih tachycardia yesterday and today, EKG reviewed, A fib with RVR  - resume Toprol 12.5mg. Increase midodrine if necessary  - cont with midodrine 10mg TID  - Amiodarone 200mg daily  - not a candidate for ACE/ARBS/ARNI due to OH    #CKD3  - Unknown baseline  - Cr stable at 1.5s  - Avoid nephrotoxins  - Monitor    #COVID-19 Infection  - PCR positive 7/7, repeat on 7/11 negative  - Monitor     #DVT prophylaxis: eliquis   82 yo with PMH of A fib, HFrEF, HTN, and hx of AVR presented to [facility] on 7/6 after fall, found to have SDH/SAH. Hospital Course complicated by A fib RVR and COVID (+). Patient now admitted to [facility] for a multidisciplinary rehab program.     #BPH  #New urinary retention on 8/4  - per urology, HOLD flomax due to orthostatic hypotension   - continue to monitor bladder scan and straight cath prn  - may require mcclain placement if recurrent retention and follow up as outpt  - urine cx showed probable contamination. pt completed 5 days of ceftin.     #SAH/SDH  - Eliquis (for AF)   - Aspiration and fall precautions    #HFrEF (EF 30%)  #Orthostatic hypotension  #A rib with RVR  - HR in 110s today  - increase Toprol to 25mg   - EKG 8/3 reviewed, A fib with RVR  - cont with midodrine 10mg TID  - Amiodarone 200mg daily  - not a candidate for ACE/ARBS/ARNI due to OH    #CKD3  - Unknown baseline  - Cr stable at 1.5s  - Avoid nephrotoxins  - Monitor    #COVID-19 Infection  - PCR positive 7/7, repeat on 7/11 negative  - Monitor     #DVT prophylaxis: eliquis   82 yo with PMH of A fib, HFrEF, HTN, and hx of AVR presented to [facility] on 7/6 after fall, found to have SDH/SAH. Hospital Course complicated by A fib RVR and COVID (+). Patient now admitted to [facility] for a multidisciplinary rehab program.     #BPH  #New urinary retention on 8/4  - per urology, HOLD flomax due to orthostatic hypotension   - continue to monitor bladder scan and straight cath prn  - may require mcclain placement if recurrent retention and follow up as outpt  - urine cx showed probable contamination. pt completed 5 days of ceftin.     #SAH/SDH  - Eliquis (for AF)   - Aspiration and fall precautions    #HFrEF (EF 30%)  #Orthostatic hypotension  #A rib with RVR  - HR in 110s today  - change Toprol to lopressor 12.5 mg BID for better rate control   - midodrine 10mg TID   - EKG 8/3 reviewed, A fib with RVR  - Amiodarone 200mg daily  - not a candidate for ACE/ARBS/ARNI due to OH    #CKD3  - Unknown baseline  - Cr stable at 1.5s  - Avoid nephrotoxins  - Monitor    #COVID-19 Infection  - PCR positive 7/7, repeat on 7/11 negative  - Monitor     #DVT prophylaxis: eliquis   82 yo with PMH of A fib, HFrEF, HTN, and hx of AVR presented to [facility] on 7/6 after fall, found to have SDH/SAH. Hospital Course complicated by A fib RVR and COVID (+). Patient now admitted to [facility] for a multidisciplinary rehab program.     #BPH  #New urinary retention on 8/4  - per urology, HOLD flomax due to orthostatic hypotension   - continue to monitor bladder scan and straight cath prn  - may require mcclain placement if recurrent retention and follow up as outpt  - urine cx showed probable contamination. pt completed 5 days of ceftin.     #SAH/SDH  - Eliquis (for AF)   - Aspiration and fall precautions    #HFrEF (EF 30%)  #Orthostatic hypotension  #A rib with RVR  - HR in 110s today  - change Toprol to lopressor 12.5 mg BID for better rate control   - increase midodrine to 15mg TID   - EKG 8/3 reviewed, A fib with RVR  - Amiodarone 200mg daily  - not a candidate for ACE/ARBS/ARNI due to OH    #CKD3  - Unknown baseline  - Cr stable at 1.5s  - Avoid nephrotoxins  - Monitor    #COVID-19 Infection  - PCR positive 7/7, repeat on 7/11 negative  - Monitor     #DVT prophylaxis: eliquis

## 2023-08-05 NOTE — PROGRESS NOTE ADULT - SUBJECTIVE AND OBJECTIVE BOX
Chief complaint: no  new complaints, improving bladder emptying with low residuals ( 160 cc)     Patient is a 83y old  Male who presents with a chief complaint of s/p subdural/subarachnoid hemorrhage (04 Aug 2023 15:36)    PAST MEDICAL & SURGICAL HISTORY:  Aortic valve replaced      Hypertension      Atrial fibrillation      Congestive heart failure (CHF)      Aortic valve replaced      S/P total knee arthroplasty      S/P hernia repair          VITALS  Vital Signs Last 24 Hrs  T(C): 36.7 (05 Aug 2023 08:45), Max: 36.9 (04 Aug 2023 21:13)  T(F): 98.1 (05 Aug 2023 08:45), Max: 98.4 (04 Aug 2023 21:13)  HR: 113 (05 Aug 2023 08:45) (61 - 113)  BP: 100/69 (05 Aug 2023 08:45) (100/69 - 109/65)  BP(mean): --  RR: 16 (05 Aug 2023 08:45) (16 - 16)  SpO2: 95% (05 Aug 2023 08:45) (95% - 98%)    Parameters below as of 05 Aug 2023 08:45  Patient On (Oxygen Delivery Method): room air          PHYSICAL EXAM  Constitutional - NAD, Comfortable  HEENT - NCAT, EOMI  Neck - Supple, No limited ROM  Chest - CTA bilaterally  Cardiovascular - RRR, S1S2  Abdomen -, Soft, NTND  Extremities -  No calf tenderness   Neurologic Exam -                    Cognitive - Awake, AAO X3     No new focal deficits                    RECENT LABS                        13.6   8.17  )-----------( 206      ( 04 Aug 2023 08:33 )             41.8     08-04    141  |  103  |  36<H>  ----------------------------<  143<H>  4.3   |  31  |  1.58<H>    Ca    9.3      04 Aug 2023 08:33  Mg     2.2     08-04        Urinalysis Basic - ( 04 Aug 2023 12:30 )    Color: Yellow / Appearance: Clear / S.012 / pH: x  Gluc: x / Ketone: Negative mg/dL  / Bili: Negative / Urobili: 0.2 mg/dL   Blood: x / Protein: Trace mg/dL / Nitrite: Negative   Leuk Esterase: Trace / RBC: 1 /HPF / WBC 4 /HPF   Sq Epi: x / Non Sq Epi: x / Bacteria: Negative /HPF              CURRENT MEDICATIONS    MEDICATIONS  (STANDING):  aMIOdarone    Tablet 200 milliGRAM(s) Oral daily  ammonium lactate 12% Lotion 1 Application(s) Topical two times a day  apixaban 5 milliGRAM(s) Oral two times a day  melatonin 9 milliGRAM(s) Oral at bedtime  metoprolol succinate ER 12.5 milliGRAM(s) Oral daily  midodrine. 10 milliGRAM(s) Oral <User Schedule>  mirtazapine 7.5 milliGRAM(s) Oral at bedtime  neomycin/bacitracin/polymyxin Topical Ointment 1 Application(s) Topical two times a day  senna 2 Tablet(s) Oral at bedtime  tamsulosin 0.4 milliGRAM(s) Oral at bedtime  zinc oxide 40% Paste 1 Application(s) Topical two times a day    MEDICATIONS  (PRN):  acetaminophen     Tablet .. 650 milliGRAM(s) Oral every 6 hours PRN Mild Pain (1 - 3)  polyethylene glycol 3350 17 Gram(s) Oral daily PRN Constipation    ASSESSMENT & PLAN          GI/Bowel Management - Dulcolax PRN, Fleet PRN   Management - Toilet Q2  Skin - Turn Q2  Pain - Tylenol PRN  DVT PPX - Apixaban      Continue comprehensive acute rehab program consisting of 3hrs/day of OT/PT and SLP.

## 2023-08-05 NOTE — PROGRESS NOTE ADULT - SUBJECTIVE AND OBJECTIVE BOX
Patient is a 83y old  Male who presents with a chief complaint of s/p subdural/subarachnoid hemorrhage (05 Aug 2023 10:07)      Subjective and overnight events:  Patient seen and examined at bedside. Pt reports mild lightheadedness sitting up. + pt voiding today. no fever ,chills, sob, cp, abd pain.     ALLERGIES:  No Known Allergies    MEDICATIONS  (STANDING):  aMIOdarone    Tablet 200 milliGRAM(s) Oral daily  ammonium lactate 12% Lotion 1 Application(s) Topical two times a day  apixaban 5 milliGRAM(s) Oral two times a day  melatonin 9 milliGRAM(s) Oral at bedtime  metoprolol succinate ER 12.5 milliGRAM(s) Oral daily  midodrine. 10 milliGRAM(s) Oral <User Schedule>  mirtazapine 7.5 milliGRAM(s) Oral at bedtime  neomycin/bacitracin/polymyxin Topical Ointment 1 Application(s) Topical two times a day  senna 2 Tablet(s) Oral at bedtime  zinc oxide 40% Paste 1 Application(s) Topical two times a day    MEDICATIONS  (PRN):  acetaminophen     Tablet .. 650 milliGRAM(s) Oral every 6 hours PRN Mild Pain (1 - 3)  polyethylene glycol 3350 17 Gram(s) Oral daily PRN Constipation    Vital Signs Last 24 Hrs  T(F): 98.1 (05 Aug 2023 08:45), Max: 98.4 (04 Aug 2023 21:13)  HR: 111 (05 Aug 2023 12:59) (61 - 113)  BP: 110/68 (05 Aug 2023 12:59) (99/61 - 110/68)  RR: 16 (05 Aug 2023 08:45) (16 - 16)  SpO2: 95% (05 Aug 2023 08:45) (95% - 98%)  I&O's Summary    04 Aug 2023 07:01  -  05 Aug 2023 07:00  --------------------------------------------------------  IN: 0 mL / OUT: 1100 mL / NET: -1100 mL      PHYSICAL EXAM:  General: NAD, awake alert   ENT: MMM  Neck: Supple, No JVD  Lungs: Clear to auscultation bilaterally  Cardio: RRR, S1/S2, No murmurs  Abdomen: Soft, Nontender, Nondistended; Bowel sounds present  Extremities: No calf tenderness, No pitting edema     LABS:                        13.6   8.17  )-----------( 206      ( 04 Aug 2023 08:33 )             41.8     08-04    141  |  103  |  36  ----------------------------<  143  4.3   |  31  |  1.58    Ca    9.3      04 Aug 2023 08:33  Mg     2.2     08-04                    Urinalysis Basic - ( 04 Aug 2023 12:30 )    Color: Yellow / Appearance: Clear / S.012 / pH: x  Gluc: x / Ketone: Negative mg/dL  / Bili: Negative / Urobili: 0.2 mg/dL   Blood: x / Protein: Trace mg/dL / Nitrite: Negative   Leuk Esterase: Trace / RBC: 1 /HPF / WBC 4 /HPF   Sq Epi: x / Non Sq Epi: x / Bacteria: Negative /HPF        Culture - Urine (collected 04 Aug 2023 12:30)  Source: Catheterized Catheterized  Final Report (05 Aug 2023 12:29):    No growth    Culture - Urine (collected 2023 18:30)  Source: Clean Catch Clean Catch (Midstream)  Final Report (02 Aug 2023 10:28):    >=3 organisms. Probable collection contamination.          RADIOLOGY & ADDITIONAL TESTS:    Care Discussed with Consultants/Other Providers:

## 2023-08-06 PROCEDURE — 99232 SBSQ HOSP IP/OBS MODERATE 35: CPT

## 2023-08-06 RX ADMIN — MIRTAZAPINE 7.5 MILLIGRAM(S): 45 TABLET, ORALLY DISINTEGRATING ORAL at 22:06

## 2023-08-06 RX ADMIN — BACITRACIN ZINC NEOMYCIN SULFATE POLYMYXIN B SULFATE 1 APPLICATION(S): 400; 3.5; 5 OINTMENT TOPICAL at 17:17

## 2023-08-06 RX ADMIN — ZINC OXIDE 1 APPLICATION(S): 200 OINTMENT TOPICAL at 05:47

## 2023-08-06 RX ADMIN — MIDODRINE HYDROCHLORIDE 15 MILLIGRAM(S): 2.5 TABLET ORAL at 17:16

## 2023-08-06 RX ADMIN — MIDODRINE HYDROCHLORIDE 15 MILLIGRAM(S): 2.5 TABLET ORAL at 05:42

## 2023-08-06 RX ADMIN — Medication 1 APPLICATION(S): at 17:16

## 2023-08-06 RX ADMIN — AMIODARONE HYDROCHLORIDE 200 MILLIGRAM(S): 400 TABLET ORAL at 05:41

## 2023-08-06 RX ADMIN — APIXABAN 5 MILLIGRAM(S): 2.5 TABLET, FILM COATED ORAL at 17:16

## 2023-08-06 RX ADMIN — Medication 9 MILLIGRAM(S): at 22:07

## 2023-08-06 RX ADMIN — Medication 12.5 MILLIGRAM(S): at 17:15

## 2023-08-06 RX ADMIN — MIDODRINE HYDROCHLORIDE 15 MILLIGRAM(S): 2.5 TABLET ORAL at 11:54

## 2023-08-06 RX ADMIN — ZINC OXIDE 1 APPLICATION(S): 200 OINTMENT TOPICAL at 17:16

## 2023-08-06 RX ADMIN — APIXABAN 5 MILLIGRAM(S): 2.5 TABLET, FILM COATED ORAL at 05:42

## 2023-08-06 NOTE — PROGRESS NOTE ADULT - SUBJECTIVE AND OBJECTIVE BOX
Chief complaint: no new complaints     Patient is a 83y old  Male who presents with a chief complaint of s/p subdural/subarachnoid hemorrhage (05 Aug 2023 14:30)      PAST MEDICAL & SURGICAL HISTORY:  Aortic valve replaced      Hypertension      Atrial fibrillation      Congestive heart failure (CHF)      Aortic valve replaced      S/P total knee arthroplasty      S/P hernia repair          VITALS  Vital Signs Last 24 Hrs  T(C): 36.5 (06 Aug 2023 07:47), Max: 36.6 (05 Aug 2023 20:11)  T(F): 97.7 (06 Aug 2023 07:47), Max: 97.9 (05 Aug 2023 20:11)  HR: 76 (06 Aug 2023 07:47) (76 - 113)  BP: 116/67 (06 Aug 2023 07:47) (90/44 - 116/67)  BP(mean): --  RR: 16 (06 Aug 2023 07:47) (16 - 16)  SpO2: 97% (06 Aug 2023 07:47) (93% - 97%)    Parameters below as of 06 Aug 2023 07:47  Patient On (Oxygen Delivery Method): room air          PHYSICAL EXAM  Constitutional - NAD, Comfortable  HEENT - NCAT, EOMI  Neck - Supple, No limited ROM  Chest - CTA bilaterally  Cardiovascular - RRR, S1S2  Abdomen -  Soft, NTND  Extremities -  No calf tenderness   Neurologic Exam -                    Cognitive - Awake, Alert     No new focal deficits                          Urinalysis Basic - ( 04 Aug 2023 12:30 )    Color: Yellow / Appearance: Clear / S.012 / pH: x  Gluc: x / Ketone: Negative mg/dL  / Bili: Negative / Urobili: 0.2 mg/dL   Blood: x / Protein: Trace mg/dL / Nitrite: Negative   Leuk Esterase: Trace / RBC: 1 /HPF / WBC 4 /HPF   Sq Epi: x / Non Sq Epi: x / Bacteria: Negative /HPF                CURRENT MEDICATIONS    MEDICATIONS  (STANDING):  aMIOdarone    Tablet 200 milliGRAM(s) Oral daily  ammonium lactate 12% Lotion 1 Application(s) Topical two times a day  apixaban 5 milliGRAM(s) Oral two times a day  melatonin 9 milliGRAM(s) Oral at bedtime  metoprolol tartrate 12.5 milliGRAM(s) Oral two times a day  midodrine. 15 milliGRAM(s) Oral <User Schedule>  mirtazapine 7.5 milliGRAM(s) Oral at bedtime  neomycin/bacitracin/polymyxin Topical Ointment 1 Application(s) Topical two times a day  senna 2 Tablet(s) Oral at bedtime  zinc oxide 40% Paste 1 Application(s) Topical two times a day    MEDICATIONS  (PRN):  acetaminophen     Tablet .. 650 milliGRAM(s) Oral every 6 hours PRN Mild Pain (1 - 3)  polyethylene glycol 3350 17 Gram(s) Oral daily PRN Constipation    ASSESSMENT & PLAN          GI/Bowel Management - Dulcolax PRN, Fleet PRN   Management - Toilet Q2  Skin - Turn Q2  Pain - Tylenol PRN  DVT PPX - Apixaban    Resolved tachycardia and improved bladder emptying       Continue comprehensive acute rehab program consisting of 3hrs/day of OT/PT and SLP.

## 2023-08-07 LAB
ANION GAP SERPL CALC-SCNC: 9 MMOL/L — SIGNIFICANT CHANGE UP (ref 5–17)
BUN SERPL-MCNC: 38 MG/DL — HIGH (ref 7–23)
CALCIUM SERPL-MCNC: 9.3 MG/DL — SIGNIFICANT CHANGE UP (ref 8.4–10.5)
CHLORIDE SERPL-SCNC: 101 MMOL/L — SIGNIFICANT CHANGE UP (ref 96–108)
CO2 SERPL-SCNC: 26 MMOL/L — SIGNIFICANT CHANGE UP (ref 22–31)
CREAT SERPL-MCNC: 1.4 MG/DL — HIGH (ref 0.5–1.3)
EGFR: 50 ML/MIN/1.73M2 — LOW
GLUCOSE SERPL-MCNC: 86 MG/DL — SIGNIFICANT CHANGE UP (ref 70–99)
HCT VFR BLD CALC: 41 % — SIGNIFICANT CHANGE UP (ref 39–50)
HGB BLD-MCNC: 12.9 G/DL — LOW (ref 13–17)
MAGNESIUM SERPL-MCNC: 2.1 MG/DL — SIGNIFICANT CHANGE UP (ref 1.6–2.6)
MCHC RBC-ENTMCNC: 31.5 GM/DL — LOW (ref 32–36)
MCHC RBC-ENTMCNC: 33.1 PG — SIGNIFICANT CHANGE UP (ref 27–34)
MCV RBC AUTO: 105.1 FL — HIGH (ref 80–100)
NRBC # BLD: 0 /100 WBCS — SIGNIFICANT CHANGE UP (ref 0–0)
PLATELET # BLD AUTO: 150 K/UL — SIGNIFICANT CHANGE UP (ref 150–400)
POTASSIUM SERPL-MCNC: 4.7 MMOL/L — SIGNIFICANT CHANGE UP (ref 3.5–5.3)
POTASSIUM SERPL-SCNC: 4.7 MMOL/L — SIGNIFICANT CHANGE UP (ref 3.5–5.3)
RBC # BLD: 3.9 M/UL — LOW (ref 4.2–5.8)
RBC # FLD: 14.3 % — SIGNIFICANT CHANGE UP (ref 10.3–14.5)
SODIUM SERPL-SCNC: 136 MMOL/L — SIGNIFICANT CHANGE UP (ref 135–145)
WBC # BLD: 11.49 K/UL — HIGH (ref 3.8–10.5)
WBC # FLD AUTO: 11.49 K/UL — HIGH (ref 3.8–10.5)

## 2023-08-07 PROCEDURE — 99232 SBSQ HOSP IP/OBS MODERATE 35: CPT

## 2023-08-07 RX ORDER — METOPROLOL TARTRATE 50 MG
12.5 TABLET ORAL
Refills: 0 | Status: DISCONTINUED | OUTPATIENT
Start: 2023-08-07 | End: 2023-08-09

## 2023-08-07 RX ORDER — DIGOXIN 250 MCG
250 TABLET ORAL ONCE
Refills: 0 | Status: COMPLETED | OUTPATIENT
Start: 2023-08-07 | End: 2023-08-07

## 2023-08-07 RX ORDER — DIGOXIN 250 MCG
125 TABLET ORAL EVERY OTHER DAY
Refills: 0 | Status: DISCONTINUED | OUTPATIENT
Start: 2023-08-08 | End: 2023-08-08

## 2023-08-07 RX ADMIN — MIRTAZAPINE 7.5 MILLIGRAM(S): 45 TABLET, ORALLY DISINTEGRATING ORAL at 20:22

## 2023-08-07 RX ADMIN — MIDODRINE HYDROCHLORIDE 15 MILLIGRAM(S): 2.5 TABLET ORAL at 11:25

## 2023-08-07 RX ADMIN — MIDODRINE HYDROCHLORIDE 15 MILLIGRAM(S): 2.5 TABLET ORAL at 05:48

## 2023-08-07 RX ADMIN — Medication 9 MILLIGRAM(S): at 20:21

## 2023-08-07 RX ADMIN — APIXABAN 5 MILLIGRAM(S): 2.5 TABLET, FILM COATED ORAL at 17:36

## 2023-08-07 RX ADMIN — Medication 250 MICROGRAM(S): at 16:45

## 2023-08-07 RX ADMIN — Medication 1 APPLICATION(S): at 17:39

## 2023-08-07 RX ADMIN — Medication 1 APPLICATION(S): at 05:49

## 2023-08-07 RX ADMIN — MIDODRINE HYDROCHLORIDE 15 MILLIGRAM(S): 2.5 TABLET ORAL at 17:36

## 2023-08-07 RX ADMIN — SENNA PLUS 2 TABLET(S): 8.6 TABLET ORAL at 20:21

## 2023-08-07 RX ADMIN — AMIODARONE HYDROCHLORIDE 200 MILLIGRAM(S): 400 TABLET ORAL at 05:47

## 2023-08-07 RX ADMIN — Medication 12.5 MILLIGRAM(S): at 05:48

## 2023-08-07 RX ADMIN — ZINC OXIDE 1 APPLICATION(S): 200 OINTMENT TOPICAL at 17:39

## 2023-08-07 RX ADMIN — ZINC OXIDE 1 APPLICATION(S): 200 OINTMENT TOPICAL at 05:49

## 2023-08-07 RX ADMIN — APIXABAN 5 MILLIGRAM(S): 2.5 TABLET, FILM COATED ORAL at 05:48

## 2023-08-07 RX ADMIN — Medication 12.5 MILLIGRAM(S): at 18:32

## 2023-08-07 NOTE — PROGRESS NOTE ADULT - SUBJECTIVE AND OBJECTIVE BOX
SUBJECTIVE/ROS: Patient evaluated while sitting in the chair. He states this morning he was feeling much better however during therapy he was noted to have a period of orthostatic hypotension which resolved when seated. Patient denies chest pain, fever, chills, nausea, vomiting, abdominal pain, headache, or BLE pain.     HPI:  This is an 83 year old male with PMH of A fib, HFrEF, HTN, and hx of AVR who presented to the [facility] on 7/6 after fall. He was found to have a subdural/subarachnoid hemorrhage. Hospital course complicated by A fib with RVR s/p amiodarone and metoprolol. Per cardio recs, recommended by EPS and pt a good candidate for Watchman implant in future and will require outpatient f/u. Course further complicated by COVID (+) on 7/6. Repeat PCR on 7/11 negative. Patient evaluated by PT/OT and recommended for acute inpatient rehab. Patient is medically stable for DC to [facility] on 7/12.       Vital Signs Last 24 Hrs  T(C): 36.8 (07 Aug 2023 09:32), Max: 36.8 (07 Aug 2023 09:32)  T(F): 98.3 (07 Aug 2023 09:32), Max: 98.3 (07 Aug 2023 09:32)  HR: 88 (07 Aug 2023 09:32) (88 - 112)  BP: 126/88 (07 Aug 2023 09:32) (115/73 - 126/88)  BP(mean): --  RR: 16 (07 Aug 2023 09:32) (16 - 16)  SpO2: 98% (07 Aug 2023 09:32) (95% - 100%)    Parameters below as of 07 Aug 2023 09:32  Patient On (Oxygen Delivery Method): room air            PHYSICAL EXAM  Constitutional - NAD, Comfortable  HEENT - NCAT, EOMI  Neck - Supple, No limited ROM  Chest - CTA bilaterally  Cardiovascular - RRR, S1S2  Abdomen -BS+, Soft, NTND  Extremities - No C/C/E, No calf tenderness   Neurologic Exam -aox3, mae4+/5        LABS:                          12.9   11.49 )-----------( 150      ( 07 Aug 2023 05:46 )             41.0     08-07    136  |  101  |  38<H>  ----------------------------<  86  4.7   |  26  |  1.40<H>    Ca    9.3      07 Aug 2023 05:46  Mg     2.1     08-07          Urinalysis Basic - ( 07 Aug 2023 05:46 )    Color: x / Appearance: x / SG: x / pH: x  Gluc: 86 mg/dL / Ketone: x  / Bili: x / Urobili: x   Blood: x / Protein: x / Nitrite: x   Leuk Esterase: x / RBC: x / WBC x   Sq Epi: x / Non Sq Epi: x / Bacteria: x              MEDICATIONS  (STANDING):  aMIOdarone    Tablet 200 milliGRAM(s) Oral daily  ammonium lactate 12% Lotion 1 Application(s) Topical two times a day  apixaban 5 milliGRAM(s) Oral two times a day  melatonin 9 milliGRAM(s) Oral at bedtime  metoprolol tartrate 12.5 milliGRAM(s) Oral two times a day  midodrine. 15 milliGRAM(s) Oral <User Schedule>  mirtazapine 7.5 milliGRAM(s) Oral at bedtime  neomycin/bacitracin/polymyxin Topical Ointment 1 Application(s) Topical two times a day  senna 2 Tablet(s) Oral at bedtime  zinc oxide 40% Paste 1 Application(s) Topical two times a day    MEDICATIONS  (PRN):  acetaminophen     Tablet .. 650 milliGRAM(s) Oral every 6 hours PRN Mild Pain (1 - 3)  polyethylene glycol 3350 17 Gram(s) Oral daily PRN Constipation

## 2023-08-07 NOTE — PROGRESS NOTE ADULT - ASSESSMENT
s/p SDH orthostatic symptoms chronic af  history of AVR hx of chf  ckd      d/w dr man  may add low dose digoxin for rate control, and try to reduce dose of b blocker  prefer not to add florinef given his history of chf

## 2023-08-07 NOTE — PROGRESS NOTE ADULT - SUBJECTIVE AND OBJECTIVE BOX
Follow up for :  postural symptoms    Interval history:  asked to reassess for postural dizziness. Currently on midodrine 15mg tid. Has AF on low dose lopressor bid and amiodarone.    SUBJ: Lightheaded when getting up, without syncope, no sob c/p.    PMH  Aortic valve replaced    Hypertension    Atrial fibrillation    Congestive heart failure (CHF)        MEDICATIONS  (STANDING):  aMIOdarone    Tablet 200 milliGRAM(s) Oral daily  ammonium lactate 12% Lotion 1 Application(s) Topical two times a day  apixaban 5 milliGRAM(s) Oral two times a day  melatonin 9 milliGRAM(s) Oral at bedtime  metoprolol tartrate 12.5 milliGRAM(s) Oral two times a day  midodrine. 15 milliGRAM(s) Oral <User Schedule>  mirtazapine 7.5 milliGRAM(s) Oral at bedtime  neomycin/bacitracin/polymyxin Topical Ointment 1 Application(s) Topical two times a day  senna 2 Tablet(s) Oral at bedtime  zinc oxide 40% Paste 1 Application(s) Topical two times a day    MEDICATIONS  (PRN):  acetaminophen     Tablet .. 650 milliGRAM(s) Oral every 6 hours PRN Mild Pain (1 - 3)  polyethylene glycol 3350 17 Gram(s) Oral daily PRN Constipation        PHYSICAL EXAM:  Vital Signs Last 24 Hrs  T(C): 36.8 (07 Aug 2023 09:32), Max: 36.8 (07 Aug 2023 09:32)  T(F): 98.3 (07 Aug 2023 09:32), Max: 98.3 (07 Aug 2023 09:32)  HR: 88 (07 Aug 2023 09:32) (88 - 112)  BP: 126/88 (07 Aug 2023 09:32) (115/73 - 126/88)  BP(mean): --  RR: 16 (07 Aug 2023 09:32) (16 - 16)  SpO2: 98% (07 Aug 2023 09:32) (98% - 100%)    Parameters below as of 07 Aug 2023 09:32  Patient On (Oxygen Delivery Method): room air        GENERAL: NAD, well-groomed, well-developed  HEAD:  Atraumatic, Normocephalic  EYES:  conjunctiva and sclera clear  ENT: Moist mucous membranes,  NECK: Supple, No JVD, no bruits  CHEST/LUNG: Clear to auscultation bilaterally; No rales, rhonchi, wheezing, or rubs  HEART: irregular, no gallop  ABDOMEN: Soft, Nontender, Nondistended; Bowel sounds present  EXTREMITIES:  , No clubbing, cyanosis, or edema  SKIN: No rashes or lesions  NERVOUS SYSTEM:  Alert     ECG:  < from: 12 Lead ECG (23 @ 16:44) >    Ventricular Rate 103 BPM    QRS Duration 122 ms    Q-T Interval 354 ms    QTC Calculation(Bazett) 463 ms    R Axis -61 degrees    T Axis 97 degrees    Diagnosis Line Atrial fibrillation with rapid ventricular response  Left anterior fascicular block  Minimal voltage criteria for LVH, may be normal variant ( Westley product )    Abnormal ECG  When compared with ECG of 01-AUG-2023 11:06,  no significant changes  Confirmed by ALYCIA BUCIO MD () on 2023 8:53:42 AM    < end of copied text >      LABS:                        12.9   11.49 )-----------( 150      ( 07 Aug 2023 05:46 )             41.0         136  |  101  |  38<H>  ----------------------------<  86  4.7   |  26  |  1.40<H>    Ca    9.3      07 Aug 2023 05:46  Mg     2.1     08                I&O's Summary    06 Aug 2023 07:01  -  07 Aug 2023 07:00  --------------------------------------------------------  IN: 0 mL / OUT: 27 mL / NET: -27 mL          RADIOLOGY & ADDITIONAL STUDIES:  < from: TTE Echo Complete w/o Contrast w/ Doppler (23 @ 13:22) >    ACC: [id number] EXAM:  ECHO TTE WO CON COMP W DOPP                          PROCEDURE DATE:  2023          INTERPRETATION:  TRANSTHORACIC ECHOCARDIOGRAM REPORT        Patient Name:   CHADD VALDIVIA JR Patient Location: [address] [place] [address] Rec #:  [id number]            Accession #:      [id number]  Account #:      [id number]            Height:           70.1 in 178.0 cm  YOB: 1940          Weight:           154.3 lb 70.00 kg  Patient Age:    83 years           BSA:              1.87 m²  Patient Gender: M                  BP:               112/73 mmHg      Date of Exam:        2023 1:22:09 PM  Sonographer:         Dewey Adrian  Referring Physician: JOLYNN RUBIO    Procedure:     2D Echo/Doppler/Color Doppler Complete.  Indications:   I50.9 - Heart failure, unspecified  Diagnosis:     I34.0 - Mitral regurgitation  Study Details: Technically suboptimal study. Study quality was adversely                 affected due to body habitus.        2D AND M-MODE MEASUREMENTS (normal ranges within parentheses):  Left                 Normal   Aorta/Left            Normal  Ventricle:                    Atrium:  IVSd (2D):    1.20  (0.7-1.1) Aortic Root    3.10  (2.4-3.7)                 cm             (2D):           cm  LVPWd (2D):   1.25  (0.7-1.1) Left Atrium    5.52  (1.9-4.0)                 cm             (2D):           cm  LVIDd (2D):   5.54  (3.4-5.7) LA Volume      77.4                 cm             Index         ml/m²  LVIDs (2D):   3.84                 cm  LV FS (2D): 30.6   (>25%)                  %  LV EF (2D):   57 %   (>55%)  Relative Wall 0.45   (<0.42)  Thickness    LV SYSTOLIC FUNCTION BY 2D PLANIMETRY (MOD):  EF-A4C View: 57.8 % EF-A2C View: 55.3 % EF-Biplane: 59 %    LV DIASTOLIC FUNCTION:  MV Peak E: 1.48 m/s Decel Time: 225 msec    SPECTRAL DOPPLER ANALYSIS (where applicable):  Mitral Valve:  MV Max Escobar:   1.47 m/s MV P1/2 Time: 65.24 msec  MV Mean Grad: 4.8 mmHg MV Area, PHT: 3.37 cm²    Aortic Valve: AoV Max Escobar: 2.72 m/s AoV Peak P.7 mmHg AoV Mean P.7 mmHg    LVOT Vmax: 0.80 m/s LVOT VTI: 0.142 m LVOT Diameter: 2.05 cm    AoV Area, Vmax: 0.97 cm² AoV Area, VTI: 1.10 cm² AoV Area, Vmn: 1.03 cm²  Ao VTI: 0.422  Aortic Insufficiency:  AI Half-time:  443 msec  AI Decel Rate: 3.17 m/s²    Tricuspid Valve and PA/RV Systolic Pressure: TR Max Velocity: 2.51 m/s RA   Pressure:  RVSP/PASP:      PHYSICIAN INTERPRETATION:  Left Ventricle: The left ventricular internal cavity size is normal. Left   ventricular wall thickness is mildly increased.  Global LV systolic function was normal. Left ventricular ejection   fraction, by visual estimation, is 55 to 60%. Abnormal (paradoxical)   septal motion consistent with post-operative status. The mitral in-flow   pattern reveals no discernable A-wave, therefore no comment on diastolic   function can be made.  Calculated LVEF by Simpsons Biplane Method 59%.  Right Ventricle: The right ventricular size is normal. RV systolic   function is mildly reduced.  Left Atrium: Severely enlarged leftatrium.  Right Atrium: Right atrial enlargement.  Pericardium: There is no evidence of pericardial effusion.  Mitral Valve: Moderate thickening and calcification of the anterior and   posterior mitral valve leaflets. There is moderate mitral annular   calcification. Moderate mitral valve regurgitation is seen. Borderline   elevated mitral valve mean gradient    5 mmHg at HR 88 BPM.  Tricuspid Valve: The tricuspid valve is normal in structure.   Mild-moderate tricuspid regurgitation is visualized.  Aortic Valve: Moderate aortic valve regurgitation is seen. There is a   bioprosthetic valve in the aortic position with peak velocity within   normal limits.  Pulmonic Valve: The pulmonic valve is normal. Mild pulmonic valve   regurgitation.  Aorta:Aortic root measured at Sinus of Valsalva is normal.  Venous: The inferior vena cava is not well visualized.      Summary:   1. Technically difficult study with poor endocardial visualization.   2. Normal global left ventricular systolic function.   3. Left ventricular ejection fraction, by visual estimation, is 55 to   60%.   4. Calculated LVEF by Simpsons Biplane Method 59%.   5. Mildly increased LV wall thickness.   6. Normal left ventricular internal cavity size.   7. Abnormal septal motion consistent with post-operative status.   8. The mitral in-flow pattern reveals no discernable A-wave, therefore   no comment on diastolic function can be made.   9. Mildly reduced RV systolic function.  10. Severely enlarged left atrium.  11. Right atrial enlargement.  12. Moderate mitral annular calcification.  13. Moderate thickening and calcification of the anterior and posterior   mitral valve leaflets.  14. Moderate mitral valve regurgitation.  15. Borderline elevated mitral valve mean gradient    5 mmHg at HR 88 BPM.  16. Mild-moderate tricuspid regurgitation.  17. There is a bioprosthetic valve in the aortic position with peak   velocity within normal limits.  18. Moderate aortic regurgitation.  19. Mild pulmonic valve regurgitation.  20. There is no evidence of pericardial effusion.    [id number] Bib Guadalupe MD Electronically signed on   2023 at 2:43:22 PM        *** Final ***    < end of copied text >

## 2023-08-07 NOTE — PROGRESS NOTE ADULT - SUBJECTIVE AND OBJECTIVE BOX
Patient is a 83y old  Male who presents with a chief complaint of s/p subdural/subarachnoid hemorrhage (07 Aug 2023 10:33)      Subjective and overnight events:  Patient seen and examined at bedside. HR better controlled today but pt still having symptomatic orthostasis. no fever, chills, sob, cp, abd pain. no more urinary retention     ALLERGIES:  No Known Allergies    MEDICATIONS  (STANDING):  aMIOdarone    Tablet 200 milliGRAM(s) Oral daily  ammonium lactate 12% Lotion 1 Application(s) Topical two times a day  apixaban 5 milliGRAM(s) Oral two times a day  melatonin 9 milliGRAM(s) Oral at bedtime  metoprolol tartrate 12.5 milliGRAM(s) Oral two times a day  midodrine. 15 milliGRAM(s) Oral <User Schedule>  mirtazapine 7.5 milliGRAM(s) Oral at bedtime  neomycin/bacitracin/polymyxin Topical Ointment 1 Application(s) Topical two times a day  senna 2 Tablet(s) Oral at bedtime  zinc oxide 40% Paste 1 Application(s) Topical two times a day    MEDICATIONS  (PRN):  acetaminophen     Tablet .. 650 milliGRAM(s) Oral every 6 hours PRN Mild Pain (1 - 3)  polyethylene glycol 3350 17 Gram(s) Oral daily PRN Constipation    Vital Signs Last 24 Hrs  T(F): 98.3 (07 Aug 2023 09:32), Max: 98.3 (07 Aug 2023 09:32)  HR: 88 (07 Aug 2023 09:32) (88 - 112)  BP: 126/88 (07 Aug 2023 09:32) (115/73 - 126/88)  RR: 16 (07 Aug 2023 09:32) (16 - 16)  SpO2: 98% (07 Aug 2023 09:32) (98% - 100%)  I&O's Summary    06 Aug 2023 07:01  -  07 Aug 2023 07:00  --------------------------------------------------------  IN: 0 mL / OUT: 27 mL / NET: -27 mL      PHYSICAL EXAM:  General: NAD, A/O x 3  ENT: MMM  Neck: Supple, No JVD  Lungs: Clear to auscultation bilaterally  Cardio: RRR, S1/S2, No murmurs  Abdomen: Soft, Nontender, Nondistended; Bowel sounds present  Extremities: No calf tenderness, No pitting edema    LABS:                        12.9   11.49 )-----------( 150      ( 07 Aug 2023 05:46 )             41.0     08-07    136  |  101  |  38  ----------------------------<  86  4.7   |  26  |  1.40    Ca    9.3      07 Aug 2023 05:46  Mg     2.1     08-07            Urinalysis Basic - ( 07 Aug 2023 05:46 )    Color: x / Appearance: x / SG: x / pH: x  Gluc: 86 mg/dL / Ketone: x  / Bili: x / Urobili: x   Blood: x / Protein: x / Nitrite: x   Leuk Esterase: x / RBC: x / WBC x   Sq Epi: x / Non Sq Epi: x / Bacteria: x        Culture - Urine (collected 04 Aug 2023 12:30)  Source: Catheterized Catheterized  Final Report (05 Aug 2023 12:29):    No growth          RADIOLOGY & ADDITIONAL TESTS:    Care Discussed with Consultants/Other Providers:

## 2023-08-07 NOTE — PROGRESS NOTE ADULT - ASSESSMENT
Assessment/Plan:  CHADD VALDIVIA is a 83y with PMH of A fib, HFrEF, HTN, and hx of AVR who presented to the [facility] on 7/6 after fall, found to have SDH/SAH.  Hospital Course complicated by A fib RVR and COVID (+). Patient now admitted for a multidisciplinary rehab program. 07-12-23 @ 15:20    #SAH/SDH  (Left frontal SDH) --7/6  -CTH 7/13--No interval change --Trace subdural hemorrhage along the left side of the posterior falx measuring 2 to 3 mm.  - Keppra completed on 7/23 per [facility] [facility] chart review  - Neurologist-Dr Osvaldo Santacruz approved restarting apixiban 7/27  - Continue comprehensive rehab program of PT/OT/SLP - 3 hours a day, 5 days a week.   - Repeat CTH on 7/20 and 7/29 show stability     #A fib  - Course c/b A fib with RVR  - Echocardiogram EF 55-60% 7/25  - Will require outpatient cardio f/u, patient good candidate for Watchman implant in future  ( Dr Jonas, cardiologist at [facility])   - Continue Amiodarone  - Metoprolol changed to 12.5mg BID 8/6 due to tachycardia but now noted to have OH on 8/7-  cardiology reconsulted due to OH and consideration of resuming digoxin   -midodrine increased to 15mg TID (cardiology in agreement)  -Continue Eliquis 5mg BID 7/27 - head CT stable 7/29     #COVID (+) -resolved   - PCR positive 7/7, repeat on 7/11 negative    #Orthostatic hypotension  -midodrine 15mg TID  8/6  - lopressor 12.5mg BID -back on due to tachycardia   -Monitor OH daily     #Pain  - continue Tylenol     #Insomnia/mood/depression  - Continue Melatonin  9 mg qhs  -Continue Mirtazapine 7.5mg at bedtime for mood/sleep and increase appetite  7/31    #GI / Bowel  - Senna qHS  - Miralax PRN Daily  - GI ppx: Protonix    #/Bladder/UTI  BPH/Urinary retention  - Flomax DCd 7/20 due to possible OH contribution -was resumed for short period due to retention whis now resolved 8/7  --F/u with his urologist  -UTI with +UA on 7/30,-completed abx treatment   -Completed Ceftin 500mg BID for 5 days   -Monitor bladder scans for retention     #Skin / Pressure injury  -Skin tear x 2 areas right forearm. continue  topical antibiotic cream (neomycin) and  protective dressing   - soft heel protectors    #Diet/Dysphagia:  - Diet Consistency: Regular  - Aspiration Precautions  - on SLP f/u  - Nutrition f/u ongoing    #DVT prophylaxis:   - SCD    # Health maintenance  - Vitamin B12 and Folate labs - normal   --------------------------------------------  IDT conference on 8/7  TDD: 8/8 to home (extended for monitoring of OH/tachycardia/urinary retention)  Barriers: Forgetful, poor balance.  Social Work: Lives alone in  with 5-6 ISABEL with 1 HR and 14 STI. Has chair lift. Gets HHA 2-3 hrs 2x per week. Daughter lives close by.  OT: SV for adls and transfers   PT: SV for transfers and ambulation, CG during turns at times   SLP: Regular with TLs. Mild cognitive deficits. Improvements in recall, safety, and fall prevention.   Family training completed.   --------------------------------------------  Outpatient Follow-up:  Specialty and Name of physician  Nephrology  Anshu Martino MD  [address] [place] [address], [address]  [facility] [address]  [phone]    Neurosurgery  [address] [facility], [address]  [place] [address]  [phone]    Electrophysiology  Jason Ortega MD  [address] [place] [address] [phone]    Eliseo Braxton MD  [address] [facility] [address] [phone]    Neurology  Dr Osvaldo Watkins  Appointment  8/16/23 at 3.40 pm at [address] [place] [address]

## 2023-08-07 NOTE — PROGRESS NOTE ADULT - ASSESSMENT
82 yo with PMH of A fib, HFrEF, HTN, and hx of AVR presented to [facility] on 7/6 after fall, found to have SDH/SAH. Hospital Course complicated by A fib RVR and COVID (+). Patient now admitted to [facility] for a multidisciplinary rehab program.     #BPH  #New urinary retention on 8/4  - pt voiding now   - per urology, HOLD flomax due to orthostatic hypotension   - continue to monitor bladder scan and straight cath prn  - may require mcclain placement if recurrent retention and follow up as outpt  - urine cx showed probable contamination. pt completed 5 days of ceftin.     #SAH/SDH  - Eliquis (for AF)   - Aspiration and fall precautions    #HFrEF (EF 30%)  #Orthostatic hypotension  #A rib with RVR  - HR better controlled with Lopressor 12.5mg BID, however, pt with persistent orthostasis despite midodrine increased to 15mg TID   - cardiology consult   - EKG 8/3 reviewed, A fib with RVR  - Amiodarone 200mg daily  - not a candidate for ACE/ARBS/ARNI due to OH    #CKD3  - Unknown baseline  - Cr stable at 1.5s  - Avoid nephrotoxins  - Monitor    #COVID-19 Infection  - PCR positive 7/7, repeat on 7/11 negative  - Monitor     #DVT prophylaxis: eliquis

## 2023-08-08 PROCEDURE — 99233 SBSQ HOSP IP/OBS HIGH 50: CPT

## 2023-08-08 PROCEDURE — 99232 SBSQ HOSP IP/OBS MODERATE 35: CPT

## 2023-08-08 RX ORDER — TAMSULOSIN HYDROCHLORIDE 0.4 MG/1
0.4 CAPSULE ORAL AT BEDTIME
Refills: 0 | Status: DISCONTINUED | OUTPATIENT
Start: 2023-08-08 | End: 2023-08-09

## 2023-08-08 RX ORDER — DIGOXIN 250 MCG
1 TABLET ORAL
Qty: 15 | Refills: 0
Start: 2023-08-08 | End: 2023-09-06

## 2023-08-08 RX ORDER — MIDODRINE HYDROCHLORIDE 2.5 MG/1
3 TABLET ORAL
Qty: 270 | Refills: 0
Start: 2023-08-08 | End: 2023-09-06

## 2023-08-08 RX ORDER — FLUDROCORTISONE ACETATE 0.1 MG/1
0.1 TABLET ORAL DAILY
Refills: 0 | Status: DISCONTINUED | OUTPATIENT
Start: 2023-08-08 | End: 2023-08-09

## 2023-08-08 RX ORDER — FLUDROCORTISONE ACETATE 0.1 MG/1
0.05 TABLET ORAL DAILY
Refills: 0 | Status: DISCONTINUED | OUTPATIENT
Start: 2023-08-08 | End: 2023-08-08

## 2023-08-08 RX ORDER — DIGOXIN 250 MCG
125 TABLET ORAL DAILY
Refills: 0 | Status: DISCONTINUED | OUTPATIENT
Start: 2023-08-08 | End: 2023-08-09

## 2023-08-08 RX ORDER — FLUDROCORTISONE ACETATE 0.1 MG/1
0.1 TABLET ORAL DAILY
Refills: 0 | Status: DISCONTINUED | OUTPATIENT
Start: 2023-08-08 | End: 2023-08-08

## 2023-08-08 RX ORDER — METOPROLOL TARTRATE 50 MG
0.5 TABLET ORAL
Qty: 15 | Refills: 0
Start: 2023-08-08 | End: 2023-09-06

## 2023-08-08 RX ADMIN — FLUDROCORTISONE ACETATE 0.1 MILLIGRAM(S): 0.1 TABLET ORAL at 13:31

## 2023-08-08 RX ADMIN — ZINC OXIDE 1 APPLICATION(S): 200 OINTMENT TOPICAL at 06:40

## 2023-08-08 RX ADMIN — Medication 125 MICROGRAM(S): at 13:31

## 2023-08-08 RX ADMIN — Medication 9 MILLIGRAM(S): at 20:51

## 2023-08-08 RX ADMIN — Medication 12.5 MILLIGRAM(S): at 17:19

## 2023-08-08 RX ADMIN — TAMSULOSIN HYDROCHLORIDE 0.4 MILLIGRAM(S): 0.4 CAPSULE ORAL at 20:51

## 2023-08-08 RX ADMIN — AMIODARONE HYDROCHLORIDE 200 MILLIGRAM(S): 400 TABLET ORAL at 05:01

## 2023-08-08 RX ADMIN — ZINC OXIDE 1 APPLICATION(S): 200 OINTMENT TOPICAL at 17:20

## 2023-08-08 RX ADMIN — MIRTAZAPINE 7.5 MILLIGRAM(S): 45 TABLET, ORALLY DISINTEGRATING ORAL at 20:51

## 2023-08-08 RX ADMIN — APIXABAN 5 MILLIGRAM(S): 2.5 TABLET, FILM COATED ORAL at 17:18

## 2023-08-08 RX ADMIN — APIXABAN 5 MILLIGRAM(S): 2.5 TABLET, FILM COATED ORAL at 05:01

## 2023-08-08 RX ADMIN — Medication 1 APPLICATION(S): at 06:39

## 2023-08-08 RX ADMIN — MIDODRINE HYDROCHLORIDE 15 MILLIGRAM(S): 2.5 TABLET ORAL at 17:18

## 2023-08-08 RX ADMIN — SENNA PLUS 2 TABLET(S): 8.6 TABLET ORAL at 20:52

## 2023-08-08 RX ADMIN — MIDODRINE HYDROCHLORIDE 15 MILLIGRAM(S): 2.5 TABLET ORAL at 13:31

## 2023-08-08 RX ADMIN — Medication 1 APPLICATION(S): at 17:20

## 2023-08-08 RX ADMIN — MIDODRINE HYDROCHLORIDE 15 MILLIGRAM(S): 2.5 TABLET ORAL at 05:11

## 2023-08-08 NOTE — PROGRESS NOTE ADULT - SUBJECTIVE AND OBJECTIVE BOX
Patient is a 83y old  Male who presents with a chief complaint of s/p subdural/subarachnoid hemorrhage (08 Aug 2023 10:56)      Subjective and overnight events:  Patient seen and examined at bedside.  had recurrent urinary retention overnight. pt admits to feeling tired today. no fever, chills, sob, cp, abd pain.     ALLERGIES:  No Known Allergies    MEDICATIONS  (STANDING):  aMIOdarone    Tablet 200 milliGRAM(s) Oral daily  ammonium lactate 12% Lotion 1 Application(s) Topical two times a day  apixaban 5 milliGRAM(s) Oral two times a day  fludroCORTISONE 0.1 milliGRAM(s) Oral daily  melatonin 9 milliGRAM(s) Oral at bedtime  metoprolol succinate ER 12.5 milliGRAM(s) Oral <User Schedule>  midodrine. 15 milliGRAM(s) Oral <User Schedule>  mirtazapine 7.5 milliGRAM(s) Oral at bedtime  neomycin/bacitracin/polymyxin Topical Ointment 1 Application(s) Topical two times a day  senna 2 Tablet(s) Oral at bedtime  tamsulosin 0.4 milliGRAM(s) Oral at bedtime  zinc oxide 40% Paste 1 Application(s) Topical two times a day    MEDICATIONS  (PRN):  acetaminophen     Tablet .. 650 milliGRAM(s) Oral every 6 hours PRN Mild Pain (1 - 3)  polyethylene glycol 3350 17 Gram(s) Oral daily PRN Constipation    Vital Signs Last 24 Hrs  T(F): 98.5 (08 Aug 2023 08:47), Max: 98.5 (08 Aug 2023 08:47)  HR: 115 (08 Aug 2023 08:47) (101 - 115)  BP: 103/70 (08 Aug 2023 08:47) (103/70 - 119/68)  RR: 16 (08 Aug 2023 08:47) (16 - 17)  SpO2: 99% (08 Aug 2023 08:47) (98% - 99%)  I&O's Summary    07 Aug 2023 07:01  -  08 Aug 2023 07:00  --------------------------------------------------------  IN: 0 mL / OUT: 1242 mL / NET: -1242 mL    08 Aug 2023 07:01  -  08 Aug 2023 11:13  --------------------------------------------------------  IN: 0 mL / OUT: 186 mL / NET: -186 mL      PHYSICAL EXAM:  General: NAD, A/O x 3  ENT: MMM  Neck: Supple, No JVD  Lungs: Clear to auscultation bilaterally  Cardio: RRR, S1/S2, No murmurs  Abdomen: Soft, Nontender, Nondistended; Bowel sounds present  Extremities: No calf tenderness, No pitting edema    LABS:                        12.9   11.49 )-----------( 150      ( 07 Aug 2023 05:46 )             41.0     08-07    136  |  101  |  38  ----------------------------<  86  4.7   |  26  |  1.40    Ca    9.3      07 Aug 2023 05:46  Mg     2.1     08-07          Urinalysis Basic - ( 07 Aug 2023 05:46 )    Color: x / Appearance: x / SG: x / pH: x  Gluc: 86 mg/dL / Ketone: x  / Bili: x / Urobili: x   Blood: x / Protein: x / Nitrite: x   Leuk Esterase: x / RBC: x / WBC x   Sq Epi: x / Non Sq Epi: x / Bacteria: x        Culture - Urine (collected 04 Aug 2023 12:30)  Source: Catheterized Catheterized  Final Report (05 Aug 2023 12:29):    No growth          RADIOLOGY & ADDITIONAL TESTS:    Care Discussed with Consultants/Other Providers:

## 2023-08-08 NOTE — PROGRESS NOTE ADULT - SUBJECTIVE AND OBJECTIVE BOX
CHADD VALDIVIA  [id number]      Cardiology re-consulted due to orthostatic hypotension and tachycardia     Chief Complaint: Fall/Subdural hemorrhage/Atrial fibrillation    Interval History:     Tele: not on telemetry (in rehab)       Current meds:   acetaminophen     Tablet .. 650 milliGRAM(s) Oral every 6 hours PRN  aMIOdarone    Tablet 200 milliGRAM(s) Oral daily  ammonium lactate 12% Lotion 1 Application(s) Topical two times a day  apixaban 5 milliGRAM(s) Oral two times a day  melatonin 9 milliGRAM(s) Oral at bedtime  metoprolol succinate ER 12.5 milliGRAM(s) Oral <User Schedule>  midodrine. 15 milliGRAM(s) Oral <User Schedule>  mirtazapine 7.5 milliGRAM(s) Oral at bedtime  neomycin/bacitracin/polymyxin Topical Ointment 1 Application(s) Topical two times a day  polyethylene glycol 3350 17 Gram(s) Oral daily PRN  senna 2 Tablet(s) Oral at bedtime  tamsulosin 0.4 milliGRAM(s) Oral at bedtime  zinc oxide 40% Paste 1 Application(s) Topical two times a day      Objective:     Vital Signs:   T(C): 36.9 (08-08-23 @ 08:47), Max: 36.9 (08-08-23 @ 08:47)  HR: 115 (08-08-23 @ 08:47) (101 - 115)  BP: 103/70 (08-08-23 @ 08:47) (103/70 - 119/68)  RR: 16 (08-08-23 @ 08:47) (16 - 17)  SpO2: 99% (08-08-23 @ 08:47) (98% - 99%)  Wt(kg): --    Physical Exam:   General: no acute distress  Neck: supple   CVS:  JVP ~ 7 cm H20, RRR, s1, s2, no murmurs  Pulm: unlabored respirations, CTAB  Ext: no lower extremity edema b/l   Neuro: awake, alert and oriented   Psych: Normal affect      Labs:   07 Aug 2023 05:46    136    |  101    |  38     ----------------------------<  86     4.7     |  26     |  1.40     Ca    9.3        07 Aug 2023 05:46  Mg     2.1       07 Aug 2023 05:46                            12.9   11.49 )-----------( 150      ( 07 Aug 2023 05:46 )             41.0                 TTE (7/24/23):   1. Technically difficult study with poor endocardial visualization.   2. Normal global left ventricular systolic function.   3. Left ventricular ejection fraction, by visual estimation, is 55 to 60%.   4. Calculated LVEF by Simpsons Biplane Method 59%.   5. Mildly increased LV wall thickness.   6. Normal left ventricular internal cavity size.   7. Abnormal septal motion consistent with post-operative status.   8. The mitral in-flow pattern reveals no discernable A-wave, therefore no comment on diastolic function can be made.   9. Mildly reduced RV systolic function.  10. Severely enlarged left atrium.  11. Right atrial enlargement.  12. Moderate mitral annular calcification.  13. Moderate thickening and calcification of the anterior and posterior mitral valve leaflets.  14. Moderate mitral valve regurgitation.  15. Borderline elevated mitral valve mean gradient 5 mmHg at HR 88 BPM.  16. Mild-moderate tricuspid regurgitation.  17. There is a bioprosthetic valve in the aortic position with peak   velocity within normal limits.  18. Moderate aortic regurgitation.  19. Mild pulmonic valve regurgitation.  20. There is no evidence of pericardial effusion.      ECG (8/1/23): atrial fibrillation with elevated HR, LAFB     CHADD VALDIVIA  [id number]      Cardiology re-consulted due to orthostatic hypotension and tachycardia     Chief Complaint: Fall/Subdural hemorrhage/Atrial fibrillation    Interval History: The patient reports generalized fatigue today, denies palpitations, chest pain, shortness of breath or syncope.     Tele: not on telemetry (in rehab)       Current meds:   acetaminophen     Tablet .. 650 milliGRAM(s) Oral every 6 hours PRN  aMIOdarone    Tablet 200 milliGRAM(s) Oral daily  ammonium lactate 12% Lotion 1 Application(s) Topical two times a day  apixaban 5 milliGRAM(s) Oral two times a day  melatonin 9 milliGRAM(s) Oral at bedtime  metoprolol succinate ER 12.5 milliGRAM(s) Oral <User Schedule>  midodrine. 15 milliGRAM(s) Oral <User Schedule>  mirtazapine 7.5 milliGRAM(s) Oral at bedtime  neomycin/bacitracin/polymyxin Topical Ointment 1 Application(s) Topical two times a day  polyethylene glycol 3350 17 Gram(s) Oral daily PRN  senna 2 Tablet(s) Oral at bedtime  tamsulosin 0.4 milliGRAM(s) Oral at bedtime  zinc oxide 40% Paste 1 Application(s) Topical two times a day      Objective:     Vital Signs:   T(C): 36.9 (08-08-23 @ 08:47), Max: 36.9 (08-08-23 @ 08:47)  HR: 115 (08-08-23 @ 08:47) (101 - 115)  BP: 103/70 (08-08-23 @ 08:47) (103/70 - 119/68)  RR: 16 (08-08-23 @ 08:47) (16 - 17)  SpO2: 99% (08-08-23 @ 08:47) (98% - 99%)  Wt(kg): --    Physical Exam:   General: no acute distress  Neck: supple   CVS:  JVP ~ 7 cm H20, tachycardic, s1, s2, no murmurs  Pulm: unlabored respirations, CTAB  Ext: no lower extremity edema b/l   Neuro: awake, alert and oriented   Psych: Normal affect      Labs:   07 Aug 2023 05:46    136    |  101    |  38     ----------------------------<  86     4.7     |  26     |  1.40     Ca    9.3        07 Aug 2023 05:46  Mg     2.1       07 Aug 2023 05:46                            12.9   11.49 )-----------( 150      ( 07 Aug 2023 05:46 )             41.0           TTE (7/24/23):   1. Technically difficult study with poor endocardial visualization.   2. Normal global left ventricular systolic function.   3. Left ventricular ejection fraction, by visual estimation, is 55 to 60%.   4. Calculated LVEF by Simpsons Biplane Method 59%.   5. Mildly increased LV wall thickness.   6. Normal left ventricular internal cavity size.   7. Abnormal septal motion consistent with post-operative status.   8. The mitral in-flow pattern reveals no discernable A-wave, therefore no comment on diastolic function can be made.   9. Mildly reduced RV systolic function.  10. Severely enlarged left atrium.  11. Right atrial enlargement.  12. Moderate mitral annular calcification.  13. Moderate thickening and calcification of the anterior and posterior mitral valve leaflets.  14. Moderate mitral valve regurgitation.  15. Borderline elevated mitral valve mean gradient 5 mmHg at HR 88 BPM.  16. Mild-moderate tricuspid regurgitation.  17. There is a bioprosthetic valve in the aortic position with peak   velocity within normal limits.  18. Moderate aortic regurgitation.  19. Mild pulmonic valve regurgitation.  20. There is no evidence of pericardial effusion.      ECG (8/1/23): atrial fibrillation with elevated HR, LAFB

## 2023-08-08 NOTE — PROGRESS NOTE ADULT - SUBJECTIVE AND OBJECTIVE BOX
SUBJECTIVE/ROS: Patient evaluated while sitting in the chair. He was noted to have urinary retention overnight requiring straight x2. He is still orthostatic at times with metoprolol still in place. Patient denies chest pain, fever, chills, nausea, vomiting, abdominal pain, headache, or BLE pain.     HPI:  This is an 83 year old male with PMH of A fib, HFrEF, HTN, and hx of AVR who presented to the [facility] on 7/6 after fall. He was found to have a subdural/subarachnoid hemorrhage. Hospital course complicated by A fib with RVR s/p amiodarone and metoprolol. Per cardio recs, recommended by EPS and pt a good candidate for Watchman implant in future and will require outpatient f/u. Course further complicated by COVID (+) on 7/6. Repeat PCR on 7/11 negative. Patient evaluated by PT/OT and recommended for acute inpatient rehab. Patient is medically stable for DC to [facility] on 7/12.       Vital Signs Last 24 Hrs  T(C): 36.9 (08 Aug 2023 08:47), Max: 36.9 (08 Aug 2023 08:47)  T(F): 98.5 (08 Aug 2023 08:47), Max: 98.5 (08 Aug 2023 08:47)  HR: 115 (08 Aug 2023 08:47) (101 - 115)  BP: 103/70 (08 Aug 2023 08:47) (103/70 - 119/68)  BP(mean): --  RR: 16 (08 Aug 2023 08:47) (16 - 17)  SpO2: 99% (08 Aug 2023 08:47) (98% - 99%)    Parameters below as of 08 Aug 2023 08:47  Patient On (Oxygen Delivery Method): room air              PHYSICAL EXAM  Constitutional - NAD, Comfortable  HEENT - NCAT, EOMI  Neck - Supple, No limited ROM  Chest - CTA bilaterally  Cardiovascular - RRR, S1S2  Abdomen -BS+, Soft, NTND  Extremities - No C/C/E, No calf tenderness   Neurologic Exam -aox3, mae4+/5        LABS:                          12.9   11.49 )-----------( 150      ( 07 Aug 2023 05:46 )             41.0     08-07    136  |  101  |  38<H>  ----------------------------<  86  4.7   |  26  |  1.40<H>    Ca    9.3      07 Aug 2023 05:46  Mg     2.1     08-07          Urinalysis Basic - ( 07 Aug 2023 05:46 )    Color: x / Appearance: x / SG: x / pH: x  Gluc: 86 mg/dL / Ketone: x  / Bili: x / Urobili: x   Blood: x / Protein: x / Nitrite: x   Leuk Esterase: x / RBC: x / WBC x   Sq Epi: x / Non Sq Epi: x / Bacteria: x          MEDICATIONS  (STANDING):  aMIOdarone    Tablet 200 milliGRAM(s) Oral daily  ammonium lactate 12% Lotion 1 Application(s) Topical two times a day  apixaban 5 milliGRAM(s) Oral two times a day  melatonin 9 milliGRAM(s) Oral at bedtime  metoprolol succinate ER 12.5 milliGRAM(s) Oral <User Schedule>  midodrine. 15 milliGRAM(s) Oral <User Schedule>  mirtazapine 7.5 milliGRAM(s) Oral at bedtime  neomycin/bacitracin/polymyxin Topical Ointment 1 Application(s) Topical two times a day  senna 2 Tablet(s) Oral at bedtime  tamsulosin 0.4 milliGRAM(s) Oral at bedtime  zinc oxide 40% Paste 1 Application(s) Topical two times a day    MEDICATIONS  (PRN):  acetaminophen     Tablet .. 650 milliGRAM(s) Oral every 6 hours PRN Mild Pain (1 - 3)  polyethylene glycol 3350 17 Gram(s) Oral daily PRN Constipation

## 2023-08-08 NOTE — PROGRESS NOTE ADULT - ASSESSMENT
Assessment/Plan:  CHADD VALDIVIA is a 83y with PMH of A fib, HFrEF, HTN, and hx of AVR who presented to the [facility] on 7/6 after fall, found to have SDH/SAH.  Hospital Course complicated by A fib RVR and COVID (+). Patient now admitted for a multidisciplinary rehab program. 07-12-23 @ 15:20    #SAH/SDH  (Left frontal SDH) --7/6  -CTH 7/13--No interval change --Trace subdural hemorrhage along the left side of the posterior falx measuring 2 to 3 mm.  - Keppra completed on 7/23 per [place] chart review  - Neurologist-Dr Osvaldo Santacruz approved restarting apixiban 7/27  - Continue comprehensive rehab program of PT/OT/SLP - 3 hours a day, 5 days a week.   - Repeat CTH on 7/20 and 7/29 show stability     #A fib  - Course c/b A fib with RVR  - Echocardiogram EF 55-60% 7/25  - Will require outpatient cardio f/u, patient good candidate for Watchman implant in future  ( Dr Jonas, cardiologist at [facility])   - Continue Amiodarone  - Metoprolol stopped 8/8  -Start digoxin with loading dose  -midodrine 15mg TID  -Continue Eliquis 5mg BID 7/27 - head CT stable 7/29     #COVID (+) -resolved   - PCR positive 7/7, repeat on 7/11 negative    #Orthostatic hypotension  -midodrine 15mg TID  8/6  - lopressor stopped  -Monitor OH daily     #Pain  - continue Tylenol     #Insomnia/mood/depression  - Continue Melatonin  9 mg qhs  -Continue Mirtazapine 7.5mg at bedtime for mood/sleep and increase appetite  7/31    #GI / Bowel  - Senna qHS  - Miralax PRN Daily  - GI ppx: Protonix    #/Bladder/UTI  BPH/Urinary retention  - Flomax resumed as patient retains without 8/8  --F/u with his urologist  -UTI with +UA on 7/30,-completed abx treatment   -Completed Ceftin 500mg BID for 5 days   -Monitor bladder scans for retention     #Skin / Pressure injury  -Skin tear x 2 areas right forearm. continue  topical antibiotic cream (neomycin) and  protective dressing   - soft heel protectors    #Diet/Dysphagia:  - Diet Consistency: Regular  - Aspiration Precautions  - on SLP f/u  - Nutrition f/u ongoing    #DVT prophylaxis:   - SCD    # Health maintenance  - Vitamin B12 and Folate labs - normal   --------------------------------------------  IDT conference on 8/7  TDD: 8/9 to home (extended for monitoring of OH/tachycardia/urinary retention)  Barriers: Forgetful, poor balance.  Social Work: Lives alone in  with 5-6 ISABEL with 1 HR and 14 STI. Has chair lift. Gets HHA 2-3 hrs 2x per week. Daughter lives close by.  OT: SV for adls and transfers   PT: SV for transfers and ambulation, CG during turns at times   SLP: Regular with TLs. Mild cognitive deficits. Improvements in recall, safety, and fall prevention.   Family training completed.   --------------------------------------------  Outpatient Follow-up:  Specialty and Name of physician  Nephrology  Anshu Martino MD  [address] [place] [address], [address]  [facility] [id number] [phone]    Neurosurgery  [address] [facility], [address]  [place] [address] [phone]    Electrophysiology  Jason Ortega MD  [address] [place] [address] [phone]    Eliseo Braxton MD  [address] [facility] [address]  [phone]    Neurology  Dr Osvaldo Watkins  Appointment  8/16/23 at 3.40 pm at [address] [place] [address]

## 2023-08-08 NOTE — PROGRESS NOTE ADULT - ASSESSMENT
Assessment:  Murali Rand is an 83 year old man with past medical history of Atrial fibrillation, Cardiomyopathy, Hypertension, Bio-AVR with recent hospitalization at [facility] after mechanical fall with resultant subdural/subarachnoid hemorrhage with hospital course consistent with atrial fibrillation with rapid ventricular rates, also with course of COVID, now admitted to [facility].    Cardiology re-consulted due to episodes of orthostatic hypotension and tachycardia. Patient reports dizziness with standing, no syncope. Denies chest pain, palpitations or shortness of breath. Most recent ECG consistent with atrial fibrillation and LAFB. Echo consistent with normal LVEF 55-60%, bio AVR with mild AR.    Recommendations:  [] Orthostatic hypotension, tachycardia: Patient may have some volume depletion, increase PO intake, consider gentle IV fluids. Increase Midodrine to TID dosing. Recommend compression stockings and abdominal binder if recurrent orthostatic hypotension. Hold beta blocker at this time. Check labs today. Treat UTI.   [] Atrial fibrillation: Eventual plan for low dose beta blocker pending BP with higher dose of Midodrine. Continue amiodarone (monitor LFTs, TSH, PFTs). Currently receiving Eliquis    The patient should follow up with his cardiologist, Dr. Braxton at [facility] when discharged. Discussed with patient, family and RN at bedside.    Bib Guadalupe MD  Cardiology            Assessment:  Murali Rand is an 83 year old man with past medical history of Atrial fibrillation, Cardiomyopathy, Hypertension, Bio-AVR with recent hospitalization at [facility] after mechanical fall with resultant subdural/subarachnoid hemorrhage with hospital course consistent with atrial fibrillation with rapid ventricular rates, also with course of COVID, now admitted to [facility].    Cardiology re-consulted due to episodes of orthostatic hypotension and tachycardia. Patient reports dizziness with standing, no syncope. Denies chest pain, palpitations or shortness of breath. Most recent ECG consistent with atrial fibrillation and LAFB. Echo consistent with normal LVEF 55-60%, bio AVR with mild AR.    Recommendations:  [] Orthostatic hypotension, AF: Continue Midodrine TID dosing, increase PO intake, consider gentle IV fluids. Agree with avoiding Florinef due to history of cardiomyopathy. Recommend compression stockings and abdominal binder if recurrent orthostatic hypotension. Consider alternatives to Flomax, follow up Urology.    [] Atrial fibrillation: HR elevated, no signs of hemodynamic instability. Repeat Digoxin oral dose today. Continue amiodarone, may require higher dose pending HR response (monitor LFTs, TSH, PFTs). Currently receiving Elimonserratis    Updated Dr. Parkinson. The patient follows with Dr. Braxton at [facility]. We will continue to follow along.    Bib Guadalupe MD  Cardiology

## 2023-08-08 NOTE — PROGRESS NOTE ADULT - ASSESSMENT
82 yo with PMH of A fib, HFrEF, HTN, and hx of AVR presented to [facility] on 7/6 after fall, found to have SDH/SAH. Hospital Course complicated by A fib RVR and COVID (+). Patient now admitted to [facility] for a multidisciplinary rehab program.     #BPH  #New urinary retention on 8/4  - pt with recurrent urinary retention. Urology advised to hold flomax and start mcclain. However, pt strongly refuses mcclain.  - will start flomax due to refusal of mcclain and will add florinef for OH  - continue to monitor bladder scan and straight cath prn  - urine cx showed probable contamination. pt completed 5 days of ceftin.     #SAH/SDH  - Eliquis (for AF)   - Aspiration and fall precautions    #HFrEF (EF 30%)  - Low dose Toprol 12.5mg  - Echo 7/2023: EF 55-60%, mildly reduced RV systolic fx. moderate MR. moderate AR.  - not a candidate for ACE/ARBS/ARNI due to OH    #A rib with RVR  - HR still in the range of 101-115  - started on digoxin 8/7. cont with digoxin 125mcg daily  - Toprol 12.5mg  - Amiodarone 200mg daily  - check Digoxin level in one week  - EKG 8/3 reviewed, A fib with RVR    #Orthostatic hypotension  - added florinef 0.1mg today  - midodrine 15mg TID    #CKD3  - Unknown baseline  - Cr stable at 1.5s  - Avoid nephrotoxins  - Monitor    #COVID-19 Infection  - PCR positive 7/7, repeat on 7/11 negative  - Monitor     #DVT prophylaxis: eliquis   82 yo with PMH of A fib, HFrEF, HTN, and hx of AVR presented to [facility] on 7/6 after fall, found to have SDH/SAH. Hospital Course complicated by A fib RVR and COVID (+). Patient now admitted to [facility] for a multidisciplinary rehab program.     #BPH  #New urinary retention on 8/4  - pt with recurrent urinary retention. Urology advised to hold flomax and start mcclain. However, pt strongly refuses mcclain.  - will start flomax due to refusal of mcclain and will add low dose florinef for OH  - continue to monitor bladder scan and straight cath prn  - urine cx showed probable contamination. pt completed 5 days of ceftin.     #SAH/SDH  - Eliquis (for AF)   - Aspiration and fall precautions    #HFrEF (EF 30%)  - Low dose Toprol 12.5mg  - Echo 7/2023: EF 55-60%, mildly reduced RV systolic fx. moderate MR. moderate AR.  - not a candidate for ACE/ARBS/ARNI due to OH    #A rib with RVR  - HR still in the range of 101-115  - started on digoxin 8/7. cont with digoxin 125mcg daily  - Toprol  - Amiodarone 200mg daily  - check Digoxin level in one week  - EKG 8/3 reviewed, A fib with RVR  - Eliquis     #Orthostatic hypotension  - added florinef 0.1mg as OH will likely worsen with starting Flomax   - midodrine 15mg TID    #CKD3  - Unknown baseline  - Cr stable at 1.5s  - Avoid nephrotoxins  - Monitor    #COVID-19 Infection  - PCR positive 7/7, repeat on 7/11 negative  - Monitor     #DVT prophylaxis: eliquis   84 yo with PMH of A fib, HFrEF, HTN, and hx of AVR presented to [facility] on 7/6 after fall, found to have SDH/SAH. Hospital Course complicated by A fib RVR and COVID (+). Patient now admitted to [facility] for a multidisciplinary rehab program.     #BPH  #New urinary retention on 8/4  - pt with recurrent urinary retention. Urology advised to hold flomax and start mcclain. However, pt strongly refuses mcclain.  - will start flomax due to refusal of mcclain and will add low dose florinef for OH  - continue to monitor bladder scan and straight cath prn  - urine cx showed probable contamination. pt completed 5 days of ceftin.     #SAH/SDH  - Eliquis (for AF)   - Aspiration and fall precautions    #HFrEF (EF 30%)  - Low dose Toprol 12.5mg  - Echo 7/2023: EF 55-60%, mildly reduced RV systolic fx. moderate MR. moderate AR.  - not a candidate for ACE/ARBS/ARNI due to OH    #A rib with RVR  - HR still in the range of 101-115  - started on digoxin 8/7. cont with digoxin 125mcg daily  - Toprol 12.5mg  - Amiodarone 200mg daily  - check Digoxin level in one week  - EKG 8/3 reviewed, A fib with RVR  - Eliquis     #Orthostatic hypotension  - added florinef 0.1mg as OH will likely worsen with starting Flomax   - midodrine 15mg TID    #CKD3  - Unknown baseline  - Cr stable at 1.5s  - Avoid nephrotoxins  - Monitor    #COVID-19 Infection  - PCR positive 7/7, repeat on 7/11 negative  - Monitor     #DVT prophylaxis: eliquis

## 2023-08-09 VITALS
TEMPERATURE: 98 F | OXYGEN SATURATION: 95 % | DIASTOLIC BLOOD PRESSURE: 62 MMHG | HEART RATE: 89 BPM | SYSTOLIC BLOOD PRESSURE: 102 MMHG | RESPIRATION RATE: 16 BRPM

## 2023-08-09 PROCEDURE — 99232 SBSQ HOSP IP/OBS MODERATE 35: CPT

## 2023-08-09 PROCEDURE — 99239 HOSP IP/OBS DSCHRG MGMT >30: CPT

## 2023-08-09 RX ORDER — TAMSULOSIN HYDROCHLORIDE 0.4 MG/1
1 CAPSULE ORAL
Qty: 30 | Refills: 0
Start: 2023-08-09 | End: 2023-09-07

## 2023-08-09 RX ORDER — DIGOXIN 250 MCG
1 TABLET ORAL
Qty: 30 | Refills: 0
Start: 2023-08-09 | End: 2023-09-07

## 2023-08-09 RX ORDER — FLUDROCORTISONE ACETATE 0.1 MG/1
1 TABLET ORAL
Qty: 30 | Refills: 0
Start: 2023-08-09 | End: 2023-09-07

## 2023-08-09 RX ADMIN — BACITRACIN ZINC NEOMYCIN SULFATE POLYMYXIN B SULFATE 1 APPLICATION(S): 400; 3.5; 5 OINTMENT TOPICAL at 05:11

## 2023-08-09 RX ADMIN — MIDODRINE HYDROCHLORIDE 15 MILLIGRAM(S): 2.5 TABLET ORAL at 12:25

## 2023-08-09 RX ADMIN — ZINC OXIDE 1 APPLICATION(S): 200 OINTMENT TOPICAL at 05:42

## 2023-08-09 RX ADMIN — Medication 125 MICROGRAM(S): at 05:09

## 2023-08-09 RX ADMIN — MIDODRINE HYDROCHLORIDE 15 MILLIGRAM(S): 2.5 TABLET ORAL at 05:10

## 2023-08-09 RX ADMIN — FLUDROCORTISONE ACETATE 0.1 MILLIGRAM(S): 0.1 TABLET ORAL at 05:10

## 2023-08-09 RX ADMIN — APIXABAN 5 MILLIGRAM(S): 2.5 TABLET, FILM COATED ORAL at 05:08

## 2023-08-09 RX ADMIN — Medication 1 APPLICATION(S): at 05:08

## 2023-08-09 RX ADMIN — AMIODARONE HYDROCHLORIDE 200 MILLIGRAM(S): 400 TABLET ORAL at 05:09

## 2023-08-09 NOTE — PROGRESS NOTE ADULT - SUBJECTIVE AND OBJECTIVE BOX
SUBJECTIVE/ROS: Patient evaluated while sitting in the chair. He denies chest pain, fever, chills, nausea, vomiting, abdominal pain, headache, or BLE pain. No acute events overnight and urination and HR concerns have resolved. He is medically stable and ready for discharge home. All questions answered and instructions reviewed.     HPI:  This is an 83 year old male with PMH of A fib, HFrEF, HTN, and hx of AVR who presented to the [facility] on 7/6 after fall. He was found to have a subdural/subarachnoid hemorrhage. Hospital course complicated by A fib with RVR s/p amiodarone and metoprolol. Per cardio recs, recommended by EPS and pt a good candidate for Watchman implant in future and will require outpatient f/u. Course further complicated by COVID (+) on 7/6. Repeat PCR on 7/11 negative. Patient evaluated by PT/OT and recommended for acute inpatient rehab. Patient is medically stable for DC to [facility] on 7/12.       Vital Signs Last 24 Hrs  T(C): 36.9 (09 Aug 2023 08:19), Max: 36.9 (09 Aug 2023 08:19)  T(F): 98.5 (09 Aug 2023 08:19), Max: 98.5 (09 Aug 2023 08:19)  HR: 89 (09 Aug 2023 08:19) (89 - 102)  BP: 102/62 (09 Aug 2023 08:19) (91/53 - 118/76)  BP(mean): --  RR: 16 (09 Aug 2023 08:19) (16 - 17)  SpO2: 95% (09 Aug 2023 08:19) (95% - 98%)    Parameters below as of 09 Aug 2023 08:19  Patient On (Oxygen Delivery Method): room air                PHYSICAL EXAM  Constitutional - NAD, Comfortable  HEENT - NCAT, EOMI  Neck - Supple, No limited ROM  Chest - CTA bilaterally  Cardiovascular - RRR, S1S2  Abdomen -BS+, Soft, NTND  Extremities - No C/C/E, No calf tenderness   Neurologic Exam -aox3, mae4+/5        LABS:                          12.9   11.49 )-----------( 150      ( 07 Aug 2023 05:46 )             41.0     08-07    136  |  101  |  38<H>  ----------------------------<  86  4.7   |  26  |  1.40<H>    Ca    9.3      07 Aug 2023 05:46  Mg     2.1     08-07          Urinalysis Basic - ( 07 Aug 2023 05:46 )    Color: x / Appearance: x / SG: x / pH: x  Gluc: 86 mg/dL / Ketone: x  / Bili: x / Urobili: x   Blood: x / Protein: x / Nitrite: x   Leuk Esterase: x / RBC: x / WBC x   Sq Epi: x / Non Sq Epi: x / Bacteria: x          MEDICATIONS  (STANDING):  aMIOdarone    Tablet 200 milliGRAM(s) Oral daily  ammonium lactate 12% Lotion 1 Application(s) Topical two times a day  apixaban 5 milliGRAM(s) Oral two times a day  digoxin     Tablet 125 MICROGram(s) Oral daily  fludroCORTISONE 0.1 milliGRAM(s) Oral daily  melatonin 9 milliGRAM(s) Oral at bedtime  metoprolol succinate ER 12.5 milliGRAM(s) Oral <User Schedule>  midodrine. 15 milliGRAM(s) Oral <User Schedule>  mirtazapine 7.5 milliGRAM(s) Oral at bedtime  neomycin/bacitracin/polymyxin Topical Ointment 1 Application(s) Topical two times a day  senna 2 Tablet(s) Oral at bedtime  tamsulosin 0.4 milliGRAM(s) Oral at bedtime  zinc oxide 40% Paste 1 Application(s) Topical two times a day    MEDICATIONS  (PRN):  acetaminophen     Tablet .. 650 milliGRAM(s) Oral every 6 hours PRN Mild Pain (1 - 3)  polyethylene glycol 3350 17 Gram(s) Oral daily PRN Constipation

## 2023-08-09 NOTE — PROGRESS NOTE ADULT - SUBJECTIVE AND OBJECTIVE BOX
CHADD VALDIVIA  [id number]      Chief Complaint: Fall/Subdural hemorrhage/Atrial fibrillation/Orthostatic hypotension     Interval History: The patient reports feeling better today, denies dizziness, chest pain, shortness of breath or palpitations.     Tele: not on telemetry (in rehab)       Current meds:   acetaminophen     Tablet .. 650 milliGRAM(s) Oral every 6 hours PRN  aMIOdarone    Tablet 200 milliGRAM(s) Oral daily  ammonium lactate 12% Lotion 1 Application(s) Topical two times a day  apixaban 5 milliGRAM(s) Oral two times a day  digoxin     Tablet 125 MICROGram(s) Oral daily  fludroCORTISONE 0.1 milliGRAM(s) Oral daily  melatonin 9 milliGRAM(s) Oral at bedtime  metoprolol succinate ER 12.5 milliGRAM(s) Oral <User Schedule>  midodrine. 15 milliGRAM(s) Oral <User Schedule>  mirtazapine 7.5 milliGRAM(s) Oral at bedtime  neomycin/bacitracin/polymyxin Topical Ointment 1 Application(s) Topical two times a day  polyethylene glycol 3350 17 Gram(s) Oral daily PRN  senna 2 Tablet(s) Oral at bedtime  tamsulosin 0.4 milliGRAM(s) Oral at bedtime  zinc oxide 40% Paste 1 Application(s) Topical two times a day      Objective:     Vital Signs:   T(C): 36.9 (08-09-23 @ 08:19), Max: 36.9 (08-09-23 @ 08:19)  HR: 89 (08-09-23 @ 08:19) (89 - 102)  BP: 102/62 (08-09-23 @ 08:19) (91/53 - 118/76)  RR: 16 (08-09-23 @ 08:19) (16 - 17)  SpO2: 95% (08-09-23 @ 08:19) (95% - 98%)  Wt(kg): --      Physical Exam:   General: no acute distress  Neck: supple   CVS:  JVP ~ 7 cm H20, irregularly irregular, s1, s2, no murmurs  Pulm: unlabored respirations, CTAB  Ext: no lower extremity edema b/l   Neuro: awake, alert and oriented   Psych: Normal affect    Labs:         TTE (7/24/23):   1. Technically difficult study with poor endocardial visualization.   2. Normal global left ventricular systolic function.   3. Left ventricular ejection fraction, by visual estimation, is 55 to 60%.   4. Calculated LVEF by Simpsons Biplane Method 59%.   5. Mildly increased LV wall thickness.   6. Normal left ventricular internal cavity size.   7. Abnormal septal motion consistent with post-operative status.   8. The mitral in-flow pattern reveals no discernable A-wave, therefore no comment on diastolic function can be made.   9. Mildly reduced RV systolic function.  10. Severely enlarged left atrium.  11. Right atrial enlargement.  12. Moderate mitral annular calcification.  13. Moderate thickening and calcification of the anterior and posterior mitral valve leaflets.  14. Moderate mitral valve regurgitation.  15. Borderline elevated mitral valve mean gradient 5 mmHg at HR 88 BPM.  16. Mild-moderate tricuspid regurgitation.  17. There is a bioprosthetic valve in the aortic position with peak   velocity within normal limits.  18. Moderate aortic regurgitation.  19. Mild pulmonic valve regurgitation.  20. There is no evidence of pericardial effusion.      ECG (8/1/23): atrial fibrillation with elevated HR, LAFB

## 2023-08-09 NOTE — PROGRESS NOTE ADULT - NUTRITIONAL ASSESSMENT
This patient has been assessed with a concern for Malnutrition and has been determined to have a diagnosis/diagnoses of Moderate protein-calorie malnutrition.    This patient is being managed with:   Diet Regular-  DASH/TLC {Sodium & Cholesterol Restricted}  Supplement Feeding Modality:  Oral  Ensure Plus High Protein Cans or Servings Per Day:  1       Frequency:  Three Times a day  Entered: Jul 13 2023  2:29PM  
This patient has been assessed with a concern for Malnutrition and has been determined to have a diagnosis/diagnoses of Moderate protein-calorie malnutrition.    This patient is being managed with:   Diet Regular-  DASH/TLC {Sodium & Cholesterol Restricted}  Supplement Feeding Modality:  Oral  Ensure Plus High Protein Cans or Servings Per Day:  1       Frequency:  Three Times a day  Entered: Jul 13 2023  2:29PM  
This patient has been assessed with a concern for Malnutrition and has been determined to have a diagnosis/diagnoses of Severe protein-calorie malnutrition.    This patient is being managed with:   Diet Regular-  Supplement Feeding Modality:  Oral  Ensure Plus High Protein Cans or Servings Per Day:  1       Frequency:  Three Times a day  Entered: Aug  8 2023 11:13AM  
This patient has been assessed with a concern for Malnutrition and has been determined to have a diagnosis/diagnoses of Moderate protein-calorie malnutrition.    This patient is being managed with:   Diet Regular-  DASH/TLC {Sodium & Cholesterol Restricted}  Supplement Feeding Modality:  Oral  Ensure Plus High Protein Cans or Servings Per Day:  1       Frequency:  Three Times a day  Entered: Jul 13 2023  2:29PM  
This patient has been assessed with a concern for Malnutrition and has been determined to have a diagnosis/diagnoses of Severe protein-calorie malnutrition.    This patient is being managed with:   Diet Regular-  DASH/TLC {Sodium & Cholesterol Restricted}  Supplement Feeding Modality:  Oral  Ensure Plus High Protein Cans or Servings Per Day:  1       Frequency:  Three Times a day  Entered: Jul 13 2023  2:29PM  
This patient has been assessed with a concern for Malnutrition and has been determined to have a diagnosis/diagnoses of Moderate protein-calorie malnutrition.    This patient is being managed with:   Diet Regular-  DASH/TLC {Sodium & Cholesterol Restricted}  Supplement Feeding Modality:  Oral  Ensure Plus High Protein Cans or Servings Per Day:  1       Frequency:  Three Times a day  Entered: Jul 13 2023  2:29PM  
This patient has been assessed with a concern for Malnutrition and has been determined to have a diagnosis/diagnoses of Severe protein-calorie malnutrition.    This patient is being managed with:   Diet Regular-  DASH/TLC {Sodium & Cholesterol Restricted}  Supplement Feeding Modality:  Oral  Ensure Plus High Protein Cans or Servings Per Day:  1       Frequency:  Three Times a day  Entered: Jul 13 2023  2:29PM  
This patient has been assessed with a concern for Malnutrition and has been determined to have a diagnosis/diagnoses of Moderate protein-calorie malnutrition.    This patient is being managed with:   Diet Regular-  DASH/TLC {Sodium & Cholesterol Restricted}  Supplement Feeding Modality:  Oral  Ensure Plus High Protein Cans or Servings Per Day:  1       Frequency:  Three Times a day  Entered: Jul 13 2023  2:29PM  
This patient has been assessed with a concern for Malnutrition and has been determined to have a diagnosis/diagnoses of Severe protein-calorie malnutrition.    This patient is being managed with:   Diet Regular-  DASH/TLC {Sodium & Cholesterol Restricted}  Supplement Feeding Modality:  Oral  Ensure Plus High Protein Cans or Servings Per Day:  1       Frequency:  Three Times a day  Entered: Jul 13 2023  2:29PM  
This patient has been assessed with a concern for Malnutrition and has been determined to have a diagnosis/diagnoses of Severe protein-calorie malnutrition.    This patient is being managed with:   Diet Regular-  Supplement Feeding Modality:  Oral  Ensure Plus High Protein Cans or Servings Per Day:  1       Frequency:  Three Times a day  Entered: Aug  8 2023 11:13AM  
This patient has been assessed with a concern for Malnutrition and has been determined to have a diagnosis/diagnoses of Moderate protein-calorie malnutrition.    This patient is being managed with:   Diet Regular-  DASH/TLC {Sodium & Cholesterol Restricted}  Supplement Feeding Modality:  Oral  Ensure Plus High Protein Cans or Servings Per Day:  1       Frequency:  Three Times a day  Entered: Jul 13 2023  2:29PM  
This patient has been assessed with a concern for Malnutrition and has been determined to have a diagnosis/diagnoses of Severe protein-calorie malnutrition.    This patient is being managed with:   Diet Regular-  DASH/TLC {Sodium & Cholesterol Restricted}  Supplement Feeding Modality:  Oral  Ensure Plus High Protein Cans or Servings Per Day:  1       Frequency:  Three Times a day  Entered: Jul 13 2023  2:29PM  
This patient has been assessed with a concern for Malnutrition and has been determined to have a diagnosis/diagnoses of Severe protein-calorie malnutrition.    This patient is being managed with:   Diet Regular-  DASH/TLC {Sodium & Cholesterol Restricted}  Supplement Feeding Modality:  Oral  Ensure Plus High Protein Cans or Servings Per Day:  1       Frequency:  Three Times a day  Entered: Jul 13 2023  2:29PM

## 2023-08-09 NOTE — PROGRESS NOTE ADULT - NS ATTEND OPT1A GEN_ALL_CORE
Medical decision making

## 2023-08-09 NOTE — PROGRESS NOTE ADULT - ASSESSMENT
Assessment:  Murali Rand is an 83 year old man with past medical history of Atrial fibrillation, Cardiomyopathy, Hypertension, Bio-AVR with recent hospitalization at [facility] after mechanical fall with resultant subdural/subarachnoid hemorrhage with hospital course consistent with atrial fibrillation with rapid ventricular rates, also with course of COVID, now admitted to [facility].    Cardiology re-consulted due to episodes of orthostatic hypotension and tachycardia. Patient reported dizziness with standing, no syncope. Denies chest pain, palpitations or shortness of breath. Most recent ECG consistent with atrial fibrillation and LAFB. Echo consistent with normal LVEF 55-60%, bio AVR with mild AR.    Recommendations:  [] Orthostatic hypotension, AF: Continue Midodrine TID dosing, increase PO intake, orthostatic vitals negative today. Recommend compression stockings and abdominal binder if recurrent orthostatic hypotension. Consider alternatives to Flomax, follow up Urology.    [] Atrial fibrillation: HR improved on current regimen and patient asymptomatic today. Continue Digoxin & Amiodarone (monitor LFTs, TSH, PFTs). Currently receiving Eliquis    The patient follows with Dr. Braxton at [facility] and should follow up with him in 1 week, discussed with patient. Please re-consult if needed.    Bib Guadalupe MD  Cardiology

## 2023-08-09 NOTE — PROGRESS NOTE ADULT - NS ATTEND AMEND GEN_ALL_CORE FT
I have personally seen and examined the patient with the NP. Medical records reviewed. I have made amendments to the documentation where necessary and adjusted the history, physical examination, and plan as documented by the NP.
I have personally seen and examined the patient with the NP. Medical records reviewed. I have made amendments to the documentation where necessary and adjusted the history, physical examination, and plan as documented by the NP.    Patient is being discharged home with home care AMANDA today.  Discharge instructions were discussed with patient and family, all current medications were sent to the pharmacy. Patient and family were educated on importance of medication compliance,  continued  care with PMD and follow-up care with the specialists in the community. Safety and fall risk precautions  were discussed in detail, counseled on healthy life style modifications.  All questions were answered to their satisfaction.    42 min spent
I have personally seen and examined the patient with the NP. Medical records reviewed. I have made amendments to the documentation where necessary and adjusted the history, physical examination, and plan as documented by the NP.    Discharge home is postponed until tomorrow - case discussed with Medicine and Cardiology - will adjust medications to modify hypotension, tachycardia and improve bladder emptying.
Seen and examined with NP and Med student  Note revised      Patient is clinically stable  No dizziness  Tolerating metoprolol  Disturbed sleep last night, otherwise no significant interval events    Details as discussed from IDT today  I also discussed with neurology on AC recommencement  Discussed with family therapy and clinical update    Continue therapy
I have personally seen and examined the patient with the NP. Medical records reviewed. I have made amendments to the documentation where necessary and adjusted the history, physical examination, and plan as documented by the NP.    OH - poorly controlled, medication adjusted, gentle IVF, case discussed with Medicine and cardiology input requested  UTI - complete ABx as per Medicine     Discharge plan reevaluated with team, will move discharge date to 8/5/23    57 min spent
I have personally seen and examined the patient with the NP. Medical records reviewed. I have made amendments to the documentation where necessary and adjusted the history, physical examination, and plan as documented by the NP.
Seen and examine with NP and Med student Ms Basurto    Patient reports no acute med complaint   Within the last 2 days, he was commenced on midodrine for orthostatic hypotension, which he is tolerating  Metoprolol was held but considering echo abnormalities as noted by his cardiologist during collateral hx last week  He had recommended that patient would continue to need beta blocker    Hx of A fib--anticoagulation on hold due to recent SDH   Clinical summary shared with neurosurgery to confirm when to recommence AC    During therapy today, noted to be tachycardic HR > 120    Rt arm skin cut healing, ecchymosis resolving  MMT 5/5    Continue therapy working on balance and progressive ambulation   Echo ordered   Recommenced low dose metoprolol succinate  Await response from neurosurgey on when to recommence AC   Will get EKG if non recently,

## 2023-08-09 NOTE — PROGRESS NOTE ADULT - NS ATTEND OPT1 GEN_ALL_CORE
I independently performed the documented:
I attest my time as attending is greater than 50% of the total combined time spent on qualifying patient care activities by the PA/NP and attending.
I attest my time as attending is greater than 50% of the total combined time spent on qualifying patient care activities by the PA/NP and attending.

## 2023-08-09 NOTE — PROGRESS NOTE ADULT - REASON FOR ADMISSION
s/p subdural/subarachnoid hemorrhage

## 2023-08-09 NOTE — PROGRESS NOTE ADULT - ASSESSMENT
Assessment/Plan:  CHADD VALDIVIA is a 83y with PMH of A fib, HFrEF, HTN, and hx of AVR who presented to the [facility] on 7/6 after fall, found to have SDH/SAH.  Hospital Course complicated by A fib RVR and COVID (+). Patient now admitted for a multidisciplinary rehab program. 07-12-23 @ 15:20    #SAH/SDH  (Left frontal SDH) --7/6  -CTH 7/13--No interval change --Trace subdural hemorrhage along the left side of the posterior falx measuring 2 to 3 mm.  - Keppra completed on 7/23 per [place] chart review  - Neurologist-Dr Osvaldo Santacruz approved restarting apixiban 7/27  - Repeat CTH on 7/20 and 7/29 show stability     #A fib  - Course c/b A fib with RVR  - Echocardiogram EF 55-60% 7/25  - Will require outpatient cardio f/u, patient good candidate for Watchman implant in future  ( Dr Jonas, cardiologist at [facility])   - Continue Amiodarone  - Metoprolol to continue as per outpatient cardiologist until outpatient follow - continue Er 12.5mg daily 8/8  -Continue digoxin 125mcg daily - no loading dose as per outpatient cardiology   - Continue florinef 0.1mg 8/8 - to improve OH until follow up with outpatient cardiology   -midodrine 15mg TID  -Continue Eliquis 5mg BID 7/27 - head CT stable 7/29     #COVID (+) -resolved   - PCR positive 7/7, repeat on 7/11 negative    #Orthostatic hypotension  -midodrine 15mg TID  8/6  -Continue florinef 0.1mg daily     #Pain  - continue Tylenol     #Insomnia/mood/depression  - Continue Melatonin  9 mg qhs  -Continue Mirtazapine 7.5mg at bedtime for mood/sleep and increase appetite  7/31    #GI / Bowel  - Senna qHS  - Miralax PRN Daily  - GI ppx: Protonix    #/Bladder/UTI  BPH/Urinary retention  - Flomax 0.4mg resumed as patient retains without 8/8 - follow up with outpatient urologist for further management   -UTI with +UA on 7/30,-completed abx treatment   -Completed Ceftin 500mg BID for 5 days     # Health maintenance  - Vitamin B12 and Folate labs - normal   --------------------------------------------  IDT conference on 8/7  TDD: 8/9 to home (extended for monitoring of OH/tachycardia/urinary retention)  Barriers: Forgetful, poor balance.  Social Work: Lives alone in  with 5-6 ISABEL with 1 HR and 14 STI. Has chair lift. Gets HHA 2-3 hrs 2x per week. Daughter lives close by.  OT: SV for adls and transfers   PT: SV for transfers and ambulation, CG during turns at times   SLP: Regular with TLs. Mild cognitive deficits. Improvements in recall, safety, and fall prevention.   Family training completed.   --------------------------------------------  Outpatient Follow-up:  Specialty and Name of physician  Nephrology  Anshu Martino MD  [address] [place] [address], [address]  [facility] [address]  [phone]    Neurosurgery  [address] [facility], [address]  [place] [address] [phone]    Electrophysiology  Jason Ortega MD  [address] [place] [address] [phone]    Eliseo Braxton MD  [address] [facility] [id number] [phone]    Neurology  Dr Osvaldo Watkins  Appointment  8/16/23 at 3.40 pm at [address] [place] [address]

## 2023-08-18 PROBLEM — I50.9 HEART FAILURE, UNSPECIFIED: Chronic | Status: ACTIVE | Noted: 2023-07-12

## 2023-08-18 PROBLEM — I48.91 UNSPECIFIED ATRIAL FIBRILLATION: Chronic | Status: ACTIVE | Noted: 2023-07-12

## 2023-08-31 PROBLEM — Z00.00 ENCOUNTER FOR PREVENTIVE HEALTH EXAMINATION: Status: ACTIVE | Noted: 2023-08-31

## 2023-09-01 ENCOUNTER — NON-APPOINTMENT (OUTPATIENT)
Age: 83
End: 2023-09-01

## 2023-09-01 ENCOUNTER — APPOINTMENT (OUTPATIENT)
Dept: PHYSICAL MEDICINE AND REHAB | Facility: CLINIC | Age: 83
End: 2023-09-01
Payer: MEDICARE

## 2023-09-01 VITALS
WEIGHT: 154 LBS | SYSTOLIC BLOOD PRESSURE: 168 MMHG | HEART RATE: 93 BPM | HEIGHT: 70 IN | BODY MASS INDEX: 22.05 KG/M2 | DIASTOLIC BLOOD PRESSURE: 95 MMHG | TEMPERATURE: 98.1 F | OXYGEN SATURATION: 96 %
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
## 2023-09-01 DIAGNOSIS — R33.9 RETENTION OF URINE, UNSPECIFIED: ICD-10-CM

## 2023-09-01 DIAGNOSIS — Z86.79 PERSONAL HISTORY OF OTHER DISEASES OF THE CIRCULATORY SYSTEM: ICD-10-CM

## 2023-09-01 DIAGNOSIS — S06.6XAA TRAUMATIC SUBARACHNOID HEMORRHAGE WITH LOSS OF CONSCIOUSNESS STATUS UNKNOWN, INITIAL ENCOUNTER: ICD-10-CM

## 2023-09-01 DIAGNOSIS — R26.9 UNSPECIFIED ABNORMALITIES OF GAIT AND MOBILITY: ICD-10-CM

## 2023-09-01 DIAGNOSIS — M17.4 OTHER BILATERAL SECONDARY OSTEOARTHRITIS OF KNEE: ICD-10-CM

## 2023-09-01 DIAGNOSIS — I10 ESSENTIAL (PRIMARY) HYPERTENSION: ICD-10-CM

## 2023-09-01 DIAGNOSIS — I48.20 CHRONIC ATRIAL FIBRILLATION, UNSP: ICD-10-CM

## 2023-09-01 PROCEDURE — 99215 OFFICE O/P EST HI 40 MIN: CPT

## 2023-09-01 RX ORDER — MIDODRINE HYDROCHLORIDE 5 MG/1
5 TABLET ORAL 3 TIMES DAILY
Qty: 90 | Refills: 0 | Status: ACTIVE | COMMUNITY
Start: 2023-09-01 | End: 1900-01-01

## 2023-09-01 RX ORDER — MIRTAZAPINE 7.5 MG/1
7.5 TABLET, FILM COATED ORAL
Qty: 30 | Refills: 0 | Status: ACTIVE | COMMUNITY
Start: 2023-09-01 | End: 1900-01-01

## 2023-09-01 RX ORDER — METOPROLOL SUCCINATE 25 MG/1
25 TABLET, EXTENDED RELEASE ORAL
Qty: 15 | Refills: 0 | Status: ACTIVE | COMMUNITY
Start: 2023-09-01 | End: 1900-01-01

## 2023-09-01 RX ADMIN — APIXABAN 0 MG: 5 TABLET, FILM COATED ORAL at 00:00

## 2023-09-01 RX ADMIN — FLUDROCORTISONE ACETATE 0 MG: 0.1 TABLET ORAL at 00:00

## 2023-09-01 RX ADMIN — DIGOXIN 0 MCG: 125 TABLET ORAL at 00:00

## 2023-09-19 PROBLEM — R33.9 URINARY RETENTION: Status: ACTIVE | Noted: 2023-09-01
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
## 2023-09-19 PROBLEM — M17.4 OTHER SECONDARY OSTEOARTHRITIS OF BOTH KNEES: Status: ACTIVE | Noted: 2023-09-01

## 2023-09-19 PROBLEM — S06.6XAA: Status: ACTIVE | Noted: 2023-09-19

## 2023-09-19 PROBLEM — I48.20 CHRONIC ATRIAL FIBRILLATION: Status: ACTIVE | Noted: 2023-09-01

## 2023-09-19 PROBLEM — I10 PRIMARY HYPERTENSION: Status: ACTIVE | Noted: 2023-09-01

## 2023-09-19 PROBLEM — Z86.79 H/O ORTHOSTATIC HYPOTENSION: Status: ACTIVE | Noted: 2023-09-01

## 2023-09-19 PROBLEM — R26.9 ABNORMAL GAIT: Status: ACTIVE | Noted: 2023-09-19

## 2023-10-05 ENCOUNTER — NON-APPOINTMENT (OUTPATIENT)
Age: 83
End: 2023-10-05

## 2023-10-05 PROCEDURE — 82652 VIT D 1 25-DIHYDROXY: CPT

## 2023-10-05 PROCEDURE — 97110 THERAPEUTIC EXERCISES: CPT

## 2023-10-05 PROCEDURE — 97530 THERAPEUTIC ACTIVITIES: CPT

## 2023-10-05 PROCEDURE — 83036 HEMOGLOBIN GLYCOSYLATED A1C: CPT

## 2023-10-05 PROCEDURE — 80053 COMPREHEN METABOLIC PANEL: CPT

## 2023-10-05 PROCEDURE — 97112 NEUROMUSCULAR REEDUCATION: CPT

## 2023-10-05 PROCEDURE — 85025 COMPLETE CBC W/AUTO DIFF WBC: CPT

## 2023-10-05 PROCEDURE — 36415 COLL VENOUS BLD VENIPUNCTURE: CPT

## 2023-10-05 PROCEDURE — 97163 PT EVAL HIGH COMPLEX 45 MIN: CPT

## 2023-10-05 PROCEDURE — 87086 URINE CULTURE/COLONY COUNT: CPT

## 2023-10-05 PROCEDURE — 97167 OT EVAL HIGH COMPLEX 60 MIN: CPT

## 2023-10-05 PROCEDURE — 82607 VITAMIN B-12: CPT

## 2023-10-05 PROCEDURE — 70450 CT HEAD/BRAIN W/O DYE: CPT

## 2023-10-05 PROCEDURE — 92523 SPEECH SOUND LANG COMPREHEN: CPT

## 2023-10-05 PROCEDURE — 85027 COMPLETE CBC AUTOMATED: CPT

## 2023-10-05 PROCEDURE — 92507 TX SP LANG VOICE COMM INDIV: CPT

## 2023-10-05 PROCEDURE — 97116 GAIT TRAINING THERAPY: CPT

## 2023-10-05 PROCEDURE — 83735 ASSAY OF MAGNESIUM: CPT

## 2023-10-05 PROCEDURE — 97535 SELF CARE MNGMENT TRAINING: CPT

## 2023-10-05 PROCEDURE — 81001 URINALYSIS AUTO W/SCOPE: CPT

## 2023-10-05 PROCEDURE — 92610 EVALUATE SWALLOWING FUNCTION: CPT

## 2023-10-05 PROCEDURE — 80048 BASIC METABOLIC PNL TOTAL CA: CPT

## 2023-10-05 PROCEDURE — 93970 EXTREMITY STUDY: CPT

## 2023-10-05 PROCEDURE — 93005 ELECTROCARDIOGRAM TRACING: CPT

## 2023-10-05 PROCEDURE — 93306 TTE W/DOPPLER COMPLETE: CPT
